# Patient Record
Sex: FEMALE | Race: OTHER | HISPANIC OR LATINO | URBAN - METROPOLITAN AREA
[De-identification: names, ages, dates, MRNs, and addresses within clinical notes are randomized per-mention and may not be internally consistent; named-entity substitution may affect disease eponyms.]

---

## 2021-02-03 ENCOUNTER — EMERGENCY (EMERGENCY)
Facility: HOSPITAL | Age: 31
LOS: 1 days | End: 2021-02-03
Attending: EMERGENCY MEDICINE
Payer: COMMERCIAL

## 2021-02-03 VITALS
HEART RATE: 83 BPM | HEIGHT: 61 IN | DIASTOLIC BLOOD PRESSURE: 84 MMHG | TEMPERATURE: 99 F | SYSTOLIC BLOOD PRESSURE: 137 MMHG | RESPIRATION RATE: 16 BRPM | WEIGHT: 149.91 LBS

## 2021-02-03 DIAGNOSIS — Z98.891 HISTORY OF UTERINE SCAR FROM PREVIOUS SURGERY: Chronic | ICD-10-CM

## 2021-02-03 LAB
ALBUMIN SERPL ELPH-MCNC: 4.2 G/DL — SIGNIFICANT CHANGE UP (ref 3.3–5)
ALP SERPL-CCNC: 137 U/L — HIGH (ref 40–120)
ALT FLD-CCNC: 46 U/L — HIGH (ref 10–45)
ANION GAP SERPL CALC-SCNC: 14 MMOL/L — SIGNIFICANT CHANGE UP (ref 5–17)
AST SERPL-CCNC: 35 U/L — SIGNIFICANT CHANGE UP (ref 10–40)
BASOPHILS # BLD AUTO: 0.03 K/UL — SIGNIFICANT CHANGE UP (ref 0–0.2)
BASOPHILS NFR BLD AUTO: 0.3 % — SIGNIFICANT CHANGE UP (ref 0–2)
BILIRUB SERPL-MCNC: 0.6 MG/DL — SIGNIFICANT CHANGE UP (ref 0.2–1.2)
BUN SERPL-MCNC: 16 MG/DL — SIGNIFICANT CHANGE UP (ref 7–23)
CALCIUM SERPL-MCNC: 9.4 MG/DL — SIGNIFICANT CHANGE UP (ref 8.4–10.5)
CHLORIDE SERPL-SCNC: 105 MMOL/L — SIGNIFICANT CHANGE UP (ref 96–108)
CO2 SERPL-SCNC: 22 MMOL/L — SIGNIFICANT CHANGE UP (ref 22–31)
CREAT SERPL-MCNC: 0.58 MG/DL — SIGNIFICANT CHANGE UP (ref 0.5–1.3)
EOSINOPHIL # BLD AUTO: 0.58 K/UL — HIGH (ref 0–0.5)
EOSINOPHIL NFR BLD AUTO: 5.3 % — SIGNIFICANT CHANGE UP (ref 0–6)
GLUCOSE SERPL-MCNC: 140 MG/DL — HIGH (ref 70–99)
HCG SERPL-ACNC: <2 MIU/ML — SIGNIFICANT CHANGE UP
HCT VFR BLD CALC: 42.3 % — SIGNIFICANT CHANGE UP (ref 34.5–45)
HGB BLD-MCNC: 13.8 G/DL — SIGNIFICANT CHANGE UP (ref 11.5–15.5)
IMM GRANULOCYTES NFR BLD AUTO: 0.3 % — SIGNIFICANT CHANGE UP (ref 0–1.5)
LYMPHOCYTES # BLD AUTO: 19.9 % — SIGNIFICANT CHANGE UP (ref 13–44)
LYMPHOCYTES # BLD AUTO: 2.19 K/UL — SIGNIFICANT CHANGE UP (ref 1–3.3)
MCHC RBC-ENTMCNC: 28.8 PG — SIGNIFICANT CHANGE UP (ref 27–34)
MCHC RBC-ENTMCNC: 32.6 GM/DL — SIGNIFICANT CHANGE UP (ref 32–36)
MCV RBC AUTO: 88.1 FL — SIGNIFICANT CHANGE UP (ref 80–100)
MONOCYTES # BLD AUTO: 0.71 K/UL — SIGNIFICANT CHANGE UP (ref 0–0.9)
MONOCYTES NFR BLD AUTO: 6.5 % — SIGNIFICANT CHANGE UP (ref 2–14)
NEUTROPHILS # BLD AUTO: 7.44 K/UL — HIGH (ref 1.8–7.4)
NEUTROPHILS NFR BLD AUTO: 67.7 % — SIGNIFICANT CHANGE UP (ref 43–77)
NRBC # BLD: 0 /100 WBCS — SIGNIFICANT CHANGE UP (ref 0–0)
PLATELET # BLD AUTO: 219 K/UL — SIGNIFICANT CHANGE UP (ref 150–400)
POTASSIUM SERPL-MCNC: 3.6 MMOL/L — SIGNIFICANT CHANGE UP (ref 3.5–5.3)
POTASSIUM SERPL-SCNC: 3.6 MMOL/L — SIGNIFICANT CHANGE UP (ref 3.5–5.3)
PROT SERPL-MCNC: 8 G/DL — SIGNIFICANT CHANGE UP (ref 6–8.3)
RBC # BLD: 4.8 M/UL — SIGNIFICANT CHANGE UP (ref 3.8–5.2)
RBC # FLD: 13.5 % — SIGNIFICANT CHANGE UP (ref 10.3–14.5)
SODIUM SERPL-SCNC: 141 MMOL/L — SIGNIFICANT CHANGE UP (ref 135–145)
WBC # BLD: 10.98 K/UL — HIGH (ref 3.8–10.5)
WBC # FLD AUTO: 10.98 K/UL — HIGH (ref 3.8–10.5)

## 2021-02-03 PROCEDURE — 72156 MRI NECK SPINE W/O & W/DYE: CPT | Mod: 26,MG

## 2021-02-03 PROCEDURE — G1004: CPT

## 2021-02-03 PROCEDURE — 70545 MR ANGIOGRAPHY HEAD W/DYE: CPT | Mod: 26,59,MG

## 2021-02-03 PROCEDURE — 99282 EMERGENCY DEPT VISIT SF MDM: CPT

## 2021-02-03 PROCEDURE — 99285 EMERGENCY DEPT VISIT HI MDM: CPT

## 2021-02-03 PROCEDURE — 70553 MRI BRAIN STEM W/O & W/DYE: CPT | Mod: 26,ME

## 2021-02-03 NOTE — CONSULT NOTE ADULT - ASSESSMENT
Possible hemiplegic migraine with aura, cannot rule out VST due to multiple risk factors. MRI appears NEGATIVE for small ischemic infarct in R. MCA territory (thalamocapsular region). In the setting of neck pain, cannot rule out migraine 2/2 cervicalgia or migraines w/ superimposed cervical radiculopathy.    Recommendations:  -Follow up MRI brain w/w/o contrast, MR venogram w/ contrast, MRI cervical spine w/w/o contrast  -Symptomatic management of headache with IV Tylenol 1,000mg, Reglan 10mg q8h PRN; Fioricet 1-2 tab q8h PRN   -Serial EKGs and trops for chest pain    Pt to be seen and discussed with Dr. Ramos, neurology attending.    Marti Barclay DO  PGY-2  Neurology Resident   31 y/o RH Monegasque-speaking woman with recent , currently breastfeed, and recent OCP use presents as referral from Dr. Simmons's office for increased frequency of intractable temporal headaches 2-4 times a week, with associated photophobia, nausea/vomiting, blurry vision, vertigo, and left sided face and body paresthesias. NIHSS 2, preMRS 0, LKN 2/3/21. Outside window for tPA. Possible hemiplegic migraine with aura, venous sinus thrombosis and acute ischemic infarct in R. MCA territory ruled out per preliminary MRI read. In the setting of neck pain, migraine could possibly be related to cervicalgia and left hemiparesis with sensory loss may be due to superimposed cervical radiculopathy in the context of imaged C4-C5 disc herniation.    Recommendations:  -Follow up MRI brain w/w/o contrast, MR venogram w/ contrast, MRI cervical spine w/w/o contrast  -Symptomatic management of headache with IV Tylenol 1,000mg, Reglan 10mg q8h PRN; Fioricet 1-2 tab q8h PRN - pain med options limited due to breastfeeding  -Encourage hydration  -Serial EKGs and trops for chest pain    Pt to be seen and discussed with Dr. Ramos, neurology attending.    Marti Barclay DO  PGY-2  Neurology Resident   31 y/o RH Citizen of [place]-speaking woman with recent , currently breastfeed, and recent OCP use (d/c'ed 1 month ago) presents as referral from Dr. Simmons's office for increased frequency of intractable temporal headaches 2-4 times a week, with associated photophobia, nausea/vomiting, blurry vision, vertigo, and left sided face and body paresthesias. NIHSS 2, preMRS 0, LKN 2/3/21. Outside window for tPA. Possible hemiplegic migraine with aura, venous sinus thrombosis and acute ischemic infarct in R. MCA territory ruled out per preliminary MRI read. In the setting of neck pain, migraine could possibly be related to cervicalgia and left hemiparesis with sensory loss may be due to superimposed cervical radiculopathy in the context of imaged C4-C5 disc herniation.    Recommendations:  -Follow up MRI brain w/w/o contrast, MR venogram w/ contrast, MRI cervical spine w/w/o contrast  -Symptomatic management of headache with IV Tylenol 1,000mg, Reglan 10mg q8h PRN; Fioricet 1-2 tab q8h PRN - pain med options limited due to breastfeeding  -Encourage hydration  -Serial EKGs and trops for chest pain    Pt to be seen and discussed with Dr. Ramos, neurology attending.    Marti Barclay DO  PGY-2  Neurology Resident   31 y/o RH Somali-speaking woman with recent , currently breastfeed, and recent OCP use x 1 month (d/c'ed 1 month ago) presents as referral from Dr. Simmons's office for increased frequency of intractable temporal headaches 2-4 times a week, with associated photophobia, nausea/vomiting, blurry vision, vertigo, and left sided face and body paresthesias. NIHSS 2, preMRS 0, LKN /3/21. Outside window for tPA. Possible hemiplegic migraine with aura (mechanism: spreading depolarization), venous sinus thrombosis and acute ischemic infarct in R. MCA territory ruled out per preliminary MRI read. In the setting of neck pain, migraine could possibly be related to cervicalgia and left hemiparesis with sensory loss may be due to superimposed cervical radiculopathy in the context of imaged C4-C5 disc herniation.    Recommendations:  -Follow up MRI brain w/w/o contrast, MR venogram w/ contrast, MRI cervical spine w/w/o contrast  -Symptomatic management of headache with IV Tylenol 1,000mg, Reglan 10mg q8h PRN; Fioricet 1-2 tab q8h PRN - pain med options limited due to breastfeeding  -Encourage hydration  -Serial EKGs and trops for chest pain    Pt to be seen and discussed with Dr. Ramos, neurology attending.    Marti Barclay DO  PGY-2  Neurology Resident   31 y/o RH Egyptian-speaking woman with recent , currently breastfeed, and recent OCP use x 1 month (d/c'ed 1 month ago) presents as referral from Dr. Simmons's office for increased frequency of intractable temporal headaches 2-4 times a week, with associated photophobia, nausea/vomiting, blurry vision, vertigo, and left sided face and body paresthesias. NIHSS 2, preMRS 0, LKN /3/21. Outside window for tPA. Possible hemiplegic migraine with aura (mechanism: spreading depolarization), venous sinus thrombosis and acute ischemic infarct in R. MCA territory ruled out per preliminary MRI read. In the setting of neck pain, migraine could possibly be related to cervicalgia and left hemiparesis with sensory loss may be due to superimposed cervical radiculopathy in the context of imaged C4-C5 disc herniation.    Recommendations:  -Follow up MRI brain w/w/o contrast, MR venogram w/ contrast, MRI cervical spine w/w/o contrast  -Symptomatic management of headache with IV Tylenol 1,000mg, Reglan 10mg q8h PRN; Fioricet 1-2 tab q8h PRN - pain med options limited due to breastfeeding  -Encourage hydration  -Serial EKGs and trops for chest pain  -q4h neurochecks  -STAT CT head if acute change in exam or mental status    Pt to be seen and discussed with Dr. Ramos, neurology attending.    Marti Barclay,   PGY-2  Neurology Resident   31 y/o RH Haitian-speaking woman with recent , currently breastfeeding, and recent OCP use x 1 month (d/c'ed 1 month ago) presents as referral from Dr. Simmons's office for increased frequency of intractable temporal headaches 2-4 times a week, with associated photophobia, nausea/vomiting, blurry vision, vertigo, and left sided face and body paresthesias. NIHSS 2, preMRS 0, LKN 2/3/. Outside window for tPA. Possible hemiplegic migraine with aura (mechanism: spreading depolarization), venous sinus thrombosis and acute ischemic infarct in R. MCA territory ruled out per preliminary MRI read. In the setting of neck pain, migraine could possibly be related to cervicalgia and left hemiparesis with sensory loss may be due to superimposed cervical radiculopathy in the context of imaged C4-C5 disc herniation.    Recommendations:  -Follow up MRI brain w/w/o contrast, MR venogram w/ contrast, MRI cervical spine w/w/o contrast  -Defer to ED regarding appropriate f/u for C4-C5 disc herniation on imaging (ortho vs neurosurgery)  -Symptomatic management of headache with IV Tylenol 1,000mg, Reglan 10mg q8h PRN; Fioricet 1-2 tab q8h PRN - pain med options limited due to breastfeeding  -Encourage hydration  -Serial EKGs and trops for chest pain  -q4h neurochecks  -STAT CT head if acute change in exam or mental status    Pt to be seen and discussed with Dr. Ramos, neurology attending.    Marti Barclay DO  PGY-2  Neurology Resident

## 2021-02-03 NOTE — ED PROVIDER NOTE - PHYSICAL EXAMINATION
GENERAL: NAD, lying in bed comfortably  HEAD:  Atraumatic, Normocephalic  EYES: EOMI, PERRLA, conjunctiva and sclera clear  ENT: Moist mucous membranes  NECK: Supple, No JVD  CHEST/LUNG: Clear to auscultation bilaterally; No rales, rhonchi, wheezing, or rubs. Unlabored respirations. TTP L. chest.  HEART: Regular rate and rhythm; No murmurs, rubs, or gallops  ABDOMEN: Bowel sounds present; Soft, Nontender, Nondistended.   EXTREMITIES:  2+ Peripheral Pulses, brisk capillary refill. No clubbing, cyanosis, or edema  NERVOUS SYSTEM:  Alert & Oriented X3, speech clear. CN1-12 intact except sensation at left V2 distribution is more painful to light touch.   MSK: L shoulder ROM and strength limited by pain; ttp.   SKIN: No rashes or lesions

## 2021-02-03 NOTE — CONSULT NOTE ADULT - SUBJECTIVE AND OBJECTIVE BOX
HPI:  31 y/o woman with recent  and OCP use presents as referral from Dr. Simmons's office for increased frequency of intractable temporal headaches 2-4 times a week, with associated photophobia, nausea, vomiting, blurry vision, and vertigo. Per outpatient notes, pt was noted to have slightly diminished sensation to light touch for left face, arm, and leg, and very mild left hemiparesis. Due to concern for venous sinus thrombosis, pt was sent into [facility] for emergent MRI/MRV imaging.    PAST MEDICAL & SURGICAL HISTORY:    FAMILY HISTORY:    Allergies    penicillin (Urticaria)    Intolerances        SHx - No smoking, No ETOH, No drug abuse      Review of Systems:  CONSTITUTIONAL:   HEENT:  No visual loss, blurred vision, double vision.  No hearing loss, sneezing, congestion, runny nose or sore throat.  SKIN:  No rash or itching.  CARDIOVASCULAR:  No chest pain, chest pressure or chest discomfort. No palpitations or edema.  RESPIRATORY:  No shortness of breath, cough or sputum.  GASTROINTESTINAL:  No anorexia, nausea, vomiting or diarrhea. No abdominal pain.  GENITOURINARY:  NO dysuria. No increased frequency. No retention. No incontinence.  NEUROLOGICAL:  See HPI  MUSCULOSKELETAL:  No muscle, back pain, joint pain or stiffness.  HEMATOLOGIC:  No anemia, bleeding or bruising.  PSYCHIATRIC:    ENDOCRINOLOGIC:  No reports of sweating, cold or heat intolerance. No polyuria or polydipsia.        Vital Signs Last 24 Hrs  T(C): 37.3 (2021 18:27), Max: 37.3 (2021 18:27)  T(F): 99.1 (2021 18:27), Max: 99.1 (2021 18:27)  HR: 83 (2021 18:27) (83 - 83)  BP: 137/84 (2021 18:27) (137/84 - 137/84)  BP(mean): --  RR: 16 (2021 18:27) (16 - 16)  SpO2: --    PHYSICAL EXAM:  GENERAL: NAD  HEENT: Normocephalic;  conjunctivae and sclerae clear; moist mucous membranes;   NECK : supple  CHEST/LUNG: Clear to auscultation bilaterally with good air entry   HEART: S1 S2  regular; no murmurs, gallops or rubs  ABDOMEN: Soft, Nontender, Nondistended; Bowel sounds present  EXTREMITIES: no cyanosis; no edema; no calf tenderness  SKIN: warm and dry; no rash             Neurological Exam:  - Mental Status:  AAOx3; speech is fluent with intact naming, repetition, and comprehension  - Cranial Nerves II-XII:    II:  PERRLA; visual fields are full to confrontation  III, IV, VI:  EOMI, no nystagmus  V:  facial sensation is intact in the V1-V3 distribution bilaterally.  VII:  face is symmetric with normal eye closure and smile  VIII:  hearing is intact to finger rub  IX, X:  uvula is midline and soft palate rises symmetrically  XI:  head turning and shoulder shrug are intact bilaterally  XII:  tongue protrudes in the midline  - Motor:  strength is 5/5 throughout; normal muscle bulk and tone throughout; no pronator drift  - Reflexes:  2+ and symmetric at the biceps, triceps, brachioradialis, knees, and ankles;  plantar reflexes downgoing bilaterally  - Sensory:  intact to light touch, pin prick, vibration, and joint-position sense throughout  - Coordination:  finger-nose-finger and heel-knee-shin intact without dysmetria; rapid alternating hand movements intact  - Gait:  normal steps, base, arm swing, and turning; heel and toe walking are normal; tandem gait is normal; Romberg testing is negative    Other:        141  |  105  |  16  ----------------------------<  140<H>  3.6   |  22  |  0.58    Ca    9.4      2021 19:02    TPro  8.0  /  Alb  4.2  /  TBili  0.6  /  DBili  x   /  AST  35  /  ALT  46<H>  /  AlkPhos  137<H>                              13.8   10.98 )-----------( 219      ( 2021 19:02 )             42.3       Radiology    CT:   MRI  EKG:  tele:  TTE:  EEG:              HPI:  29 y/o woman with recent  and OCP use presents as referral from Dr. Simmons's office for increased frequency of intractable temporal headaches 2-4 times a week, with associated photophobia, nausea, vomiting, blurry vision, and vertigo. She has bilateral temporal 8/10 headaches almost everyday since her pregnancy. Pt states she first starts to have flashing lights of her left eye, a few hours later she has a throbbing headache located at her left and right temples with no radiation. Today the headache was associated with left-sided neck pain, paresthesias of LUE, LLE, and left cheek. Prior to pregnancy, pt reports headaches twice a month that were not as severe and tension-type. Of note, she has also been sleep deprived recently due to  of 4 months and headaches also keep her from sleeping well. She does not take any OTC medications. Pt also complains of chest pressure that radiates to her left upper back. She denies change in headache quality associated with position or coughing. Her mother told her the corner of her mouth on the left looked like it was drooping today and she went to Dr. Simmons's (neurologist) office. Per outpatient notes, pt was noted to have slightly diminished sensation to light touch for left face, arm, and leg, and very mild left hemiparesis. Due to concern for venous sinus thrombosis, pt was sent to [facility] for emergent MRI/MRV imaging.    NIHSS ***, preMRS 0, LKN unknown 2/3/21    Allergies  penicillin (Urticaria)    SHx - No smoking, No ETOH, No drug abuse    Review of Systems:  CONSTITUTIONAL: No fevers, chills, night sweats  HEENT:  No visual loss, blurred vision, double vision. No hearing loss, sneezing, congestion, runny nose or sore throat.  SKIN:  No rash or itching.  CARDIOVASCULAR: +Chest discomfort w/ some radiation to L posterior back. No palpitations or edema.  RESPIRATORY: No shortness of breath, cough or sputum.  GASTROINTESTINAL:  No anorexia, nausea, vomiting or diarrhea. No abdominal pain.  GENITOURINARY:  No dysuria. No increased frequency. No retention. No incontinence.  NEUROLOGICAL:  See HPI  MUSCULOSKELETAL: +neck pain; no back pain, joint pain or stiffness.  HEMATOLOGIC:  No anemia, bleeding or bruising.    Vital Signs Last 24 Hrs  T(C): 37.3 (2021 18:27), Max: 37.3 (2021 18:27)  T(F): 99.1 (2021 18:27), Max: 99.1 (2021 18:27)  HR: 83 (2021 18:27) (83 - 83)  BP: 137/84 (2021 18:27) (137/84 - 137/84)  BP(mean): --  RR: 16 (2021 18:27) (16 - 16)  SpO2: --    PHYSICAL EXAM:  GENERAL: Obese female in NAD   HEENT: Normocephalic; conjunctivae and sclerae clear; moist mucous membranes  NECK: Supple  EXTREMITIES: No cyanosis; no edema; no calf tenderness  SKIN: Warm and dry; no rash             Neurological Exam:  - Mental Status: Alert and oriented to person, place, time, situation; no dysarthria, speech is fluent with intact naming, repetition, and comprehension; follows crossed commands  - Cranial Nerves II-XII:    II:  PERRL 4mm b/l; visual fields are full to confrontation  Visual acuity 20/20 bilaterally, no red color desaturation on my exam  III, IV, VI:  EOMI, no nystagmus  V: diminished to light touch in the left V1, V2 distribution  VII: mild L. flattening of nasolabial fold with normal eye closure  VIII:  hearing is intact to finger rub  IX, X:  uvula is midline and soft palate rises symmetrically  XI:  head turning and shoulder shrug are intact bilaterally  XII:  tongue protrudes in the midline  - Motor: 4+/5 LUE and LLE, otherwise 5/5 throughout; normal muscle bulk and tone throughout   - Reflexes: 2+ and symmetric at the biceps, triceps, brachioradialis, knees, and ankles; plantar reflexes downgoing bilaterally  - Sensory: diminished to light touch for LUE and LLE compared to right side  - Coordination: finger-nose-finger and heel-knee-shin intact without dysmetria; rapid alternating hand movements intact  - Gait: deferred    Other:    -    141  |  105  |  16  ----------------------------<  140<H>  3.6   |  22  |  0.58    Ca    9.4      2021 19:02    TPro  8.0  /  Alb  4.2  /  TBili  0.6  /  DBili  x   /  AST  35  /  ALT  46<H>  /  AlkPhos  137<H>  0203                            13.8   10.98 )-----------( 219      ( 2021 19:02 )             42.3       Radiology                 HPI:  31 y/o RH Pashto-speaking woman with recent , currently breastfeed, and recent OCP use presents as referral from Dr. Simmons's office for increased frequency of intractable temporal headaches 2-4 times a week, with associated photophobia, nausea/vomiting, blurry vision, vertigo, and left sided face and body paresthesias. She has bilateral temporal 8/10 headaches almost everyday since her pregnancy. Pt states she first starts to have flashing lights of her left eye, a few hours later she has a throbbing headache located at her left and right temples with no radiation. Today the headache was associated with left-sided neck pain, paresthesias of LUE, LLE, and left cheek. Prior to pregnancy, pt reports headaches twice a month that were not as severe and tension-type. Of note, she has also been sleep deprived recently due to  of 4 months and headaches also keep her from sleeping well. She does not take any OTC medications. Pt also complains of chest pressure that radiates to her left upper back. She denies change in headache quality associated with position or coughing. Her mother told her the corner of her mouth on the left looked like it was drooping today and she went to Dr. Simmons's (neurologist) office. Per outpatient notes, pt was noted to have slightly diminished sensation to light touch for left face, arm, and leg, and very mild left hemiparesis. Due to concern for venous sinus thrombosis, pt was sent to [facility] for emergent MRI/MRV imaging.    NIHSS 2, preMRS 0, LKN unknown 2/3/21    Allergies  penicillin (Urticaria)    SHx - No smoking, No ETOH, No drug abuse    Review of Systems:  CONSTITUTIONAL: No fevers, chills, night sweats  HEENT:  No visual loss, blurred vision, double vision. No hearing loss, sneezing, congestion, runny nose or sore throat.  SKIN:  No rash or itching.  CARDIOVASCULAR: +Chest discomfort w/ some radiation to L posterior back. No palpitations or edema.  RESPIRATORY: No shortness of breath, cough or sputum.  GASTROINTESTINAL:  No anorexia, nausea, vomiting or diarrhea. No abdominal pain.  GENITOURINARY:  No dysuria. No increased frequency. No retention. No incontinence.  NEUROLOGICAL:  See HPI  MUSCULOSKELETAL: +neck pain; no back pain, joint pain or stiffness.  HEMATOLOGIC:  No anemia, bleeding or bruising.    Vital Signs Last 24 Hrs  T(C): 37.3 (2021 18:27), Max: 37.3 (2021 18:27)  T(F): 99.1 (2021 18:27), Max: 99.1 (2021 18:27)  HR: 83 (2021 18:27) (83 - 83)  BP: 137/84 (2021 18:27) (137/84 - 137/84)  BP(mean): --  RR: 16 (2021 18:27) (16 - 16)  SpO2: --    PHYSICAL EXAM:  GENERAL: Obese female in NAD   HEENT: Normocephalic; conjunctivae and sclerae clear; moist mucous membranes  NECK: Supple  EXTREMITIES: No cyanosis; no edema; no calf tenderness  SKIN: Warm and dry; no rash             Neurological Exam:  - Mental Status: Alert and oriented to person, place, time, situation; no dysarthria, speech is fluent with intact naming, repetition, and comprehension; follows crossed commands  - Cranial Nerves II-XII:    II:  PERRL 4mm b/l; visual fields are full to confrontation  Visual acuity 20/20 bilaterally, no red color desaturation on my exam  III, IV, VI:  EOMI w/ slight discomfort with upgaze, no nystagmus  V: diminished to light touch in the left V1, V2 distribution  VII: mild L. flattening of nasolabial fold with normal eye closure  VIII:  hearing is intact to finger rub  IX, X:  uvula is midline and soft palate rises symmetrically  XI:  head turning and shoulder shrug are intact bilaterally  XII:  tongue protrudes in the midline  - Motor: 4+/5 LUE and LLE, no drift, otherwise 5/5 throughout; normal muscle bulk and tone throughout   - Reflexes: 2+ and symmetric at the biceps, triceps, brachioradialis, knees, and ankles; plantar reflexes downgoing bilaterally  - Sensory: diminished to light touch for LUE and LLE compared to right side  - Coordination: finger-nose-finger and heel-knee-shin intact without dysmetria; rapid alternating hand movements intact  - Gait: deferred    Other:        141  |  105  |  16  ----------------------------<  140<H>  3.6   |  22  |  0.58    Ca    9.4      2021 19:02    TPro  8.0  /  Alb  4.2  /  TBili  0.6  /  DBili  x   /  AST  35  /  ALT  46<H>  /  AlkPhos  137<H>                              13.8   10.98 )-----------( 219      ( 2021 19:02 )             42.3       Radiology  MR Venogram Head w/ IV Cont (21 @ 23:08)   ******PRELIMINARY REPORT******        INTERPRETATION:  No dural venous sinus thrombosis.  Dominant left transvere/sigmoid sinus  High riding left jugular bulb  f/u official report              HPI:  31 y/o RH Mohawk-speaking woman with recent , currently breastfeed, and recent OCP use (d/c'ed 1 month ago) presents as referral from Dr. Simmons's office for increased frequency of intractable temporal headaches 2-4 times a week, with associated photophobia, nausea/vomiting, blurry vision, vertigo, and left sided face and body paresthesias. She has bilateral (L>R) temporal 8/10 headaches almost everyday since her pregnancy. Pt states she first starts to have flashing lights of her left eye, a few hours later she has a throbbing headache located at her left and right temples with no radiation. Today the headache was associated with left-sided neck pain, paresthesias of LUE, LLE, and left cheek. Prior to pregnancy, pt reports headaches twice a month that were not as severe and tension-type. Of note, she has also been sleep deprived recently due to  of 4 months and headaches also keep her from sleeping well. She does not take any OTC medications. Pt also complains of chest pressure that radiates to her left upper back. She denies change in headache quality associated with position or coughing. Her mother told her the corner of her mouth on the left looked like it was drooping today and she went to Dr. Simmons's (neurologist) office. Per outpatient notes, pt was noted to have slightly diminished sensation to light touch for left face, arm, and leg, and very mild left hemiparesis. Due to concern for venous sinus thrombosis, pt was sent to [facility] for emergent MRI/MRV imaging.    NIHSS 2, preMRS 0, LKN unknown 2/3/21    Allergies  penicillin (Urticaria)    SHx - No smoking, No ETOH, No drug abuse    Review of Systems:  CONSTITUTIONAL: No fevers, chills, night sweats  HEENT:  No visual loss, blurred vision, double vision. No hearing loss, sneezing, congestion, runny nose or sore throat.  SKIN:  No rash or itching.  CARDIOVASCULAR: +Chest discomfort w/ some radiation to L posterior back. No palpitations or edema.  RESPIRATORY: No shortness of breath, cough or sputum.  GASTROINTESTINAL:  No anorexia, nausea, vomiting or diarrhea. No abdominal pain.  GENITOURINARY:  No dysuria. No increased frequency. No retention. No incontinence.  NEUROLOGICAL:  See HPI  MUSCULOSKELETAL: +neck pain; no back pain, joint pain or stiffness.  HEMATOLOGIC:  No anemia, bleeding or bruising.    Vital Signs Last 24 Hrs  T(C): 37.3 (2021 18:27), Max: 37.3 (2021 18:27)  T(F): 99.1 (2021 18:27), Max: 99.1 (2021 18:27)  HR: 83 (2021 18:27) (83 - 83)  BP: 137/84 (2021 18:27) (137/84 - 137/84)  BP(mean): --  RR: 16 (2021 18:27) (16 - 16)  SpO2: --    PHYSICAL EXAM:  GENERAL: Obese female in NAD   HEENT: Normocephalic; conjunctivae and sclerae clear; moist mucous membranes  NECK: Supple  EXTREMITIES: No cyanosis; no edema; no calf tenderness  SKIN: Warm and dry; no rash             Neurological Exam:  - Mental Status: Alert and oriented to person, place, time, situation; no dysarthria, speech is fluent with intact naming, repetition, and comprehension; follows crossed commands  - Cranial Nerves II-XII:    II:  PERRL 4mm b/l; visual fields are full to confrontation  Visual acuity 20/20 bilaterally, no red color desaturation on my exam  III, IV, VI:  EOMI w/ slight discomfort with upgaze, no nystagmus  V: diminished to light touch in the left V1, V2 distribution  VII: mild L. flattening of nasolabial fold with normal eye closure  VIII:  hearing is intact to finger rub  IX, X:  uvula is midline and soft palate rises symmetrically  XI:  head turning and shoulder shrug are intact bilaterally  XII:  tongue protrudes in the midline  - Motor: 4+/5 LUE and LLE, no drift, otherwise 5/5 throughout; normal muscle bulk and tone throughout   - Reflexes: 2+ and symmetric at the biceps, triceps, brachioradialis, knees, and ankles; plantar reflexes downgoing bilaterally  - Sensory: diminished to light touch for LUE and LLE compared to right side  - Coordination: finger-nose-finger and heel-knee-shin intact without dysmetria; rapid alternating hand movements intact  - Gait: deferred    Other:    02-03    141  |  105  |  16  ----------------------------<  140<H>  3.6   |  22  |  0.58    Ca    9.4      2021 19:02    TPro  8.0  /  Alb  4.2  /  TBili  0.6  /  DBili  x   /  AST  35  /  ALT  46<H>  /  AlkPhos  137<H>                              13.8   10.98 )-----------( 219      ( 2021 19:02 )             42.3       Radiology  MR Venogram Head w/ IV Cont (21 @ 23:08)   ******PRELIMINARY REPORT******        INTERPRETATION:  No dural venous sinus thrombosis.  Dominant left transvere/sigmoid sinus  High riding left jugular bulb  f/u official report              HPI:  29 y/o RH Georgian-speaking woman with recent , currently breastfeed, and recent OCP use x 1 month (d/c'ed 1 month ago) presents as referral from Dr. Simmons's office for increased frequency of intractable temporal headaches 2-4 times a week, with associated photophobia, nausea/vomiting, blurry vision, vertigo, and left sided face and body paresthesias. She has bilateral (L>R) temporal 8/10 headaches almost everyday since her pregnancy. Pt states she first starts to have flashing lights of her left eye, a few hours later she has a throbbing headache located at her left and right temples with no radiation. Today the headache was associated with left-sided neck pain, paresthesias of LUE, LLE, and left cheek. Prior to pregnancy, pt reports headaches twice a month that were not as severe and tension-type. Of note, she has also been sleep deprived recently due to  of 4 months and headaches also keep her from sleeping well. She does not take any OTC medications. Pt also complains of chest pressure that radiates to her left upper back. She denies change in headache quality associated with position or coughing. Her mother told her the corner of her mouth on the left looked like it was drooping today and she went to Dr. Simmons's (neurologist) office. Per outpatient notes, pt was noted to have slightly diminished sensation to light touch for left face, arm, and leg, and very mild left hemiparesis. Due to concern for venous sinus thrombosis, pt was sent to [facility] for emergent MRI/MRV imaging.    NIHSS 2, preMRS 0, LKN unknown 2/3/21    Allergies  penicillin (Urticaria)    SHx - No smoking, No ETOH, No drug abuse    Review of Systems:  CONSTITUTIONAL: No fevers, chills, night sweats  HEENT:  No visual loss, blurred vision, double vision. No hearing loss, sneezing, congestion, runny nose or sore throat.  SKIN:  No rash or itching.  CARDIOVASCULAR: +Chest discomfort w/ some radiation to L posterior back. No palpitations or edema.  RESPIRATORY: No shortness of breath, cough or sputum.  GASTROINTESTINAL:  No anorexia, nausea, vomiting or diarrhea. No abdominal pain.  GENITOURINARY:  No dysuria. No increased frequency. No retention. No incontinence.  NEUROLOGICAL:  See HPI  MUSCULOSKELETAL: +neck pain; no back pain, joint pain or stiffness.  HEMATOLOGIC:  No anemia, bleeding or bruising.    Vital Signs Last 24 Hrs  T(C): 37.3 (2021 18:27), Max: 37.3 (2021 18:27)  T(F): 99.1 (2021 18:27), Max: 99.1 (2021 18:27)  HR: 83 (2021 18:27) (83 - 83)  BP: 137/84 (2021 18:27) (137/84 - 137/84)  BP(mean): --  RR: 16 (2021 18:27) (16 - 16)  SpO2: --    PHYSICAL EXAM:  GENERAL: Obese female in NAD   HEENT: Normocephalic; conjunctivae and sclerae clear; moist mucous membranes  NECK: Supple  EXTREMITIES: No cyanosis; no edema; no calf tenderness  SKIN: Warm and dry; no rash             Neurological Exam:  - Mental Status: Alert and oriented to person, place, time, situation; no dysarthria, speech is fluent with intact naming, repetition, and comprehension; follows crossed commands  - Cranial Nerves II-XII:    II:  PERRL 4mm b/l; visual fields are full to confrontation  Visual acuity 20/20 bilaterally, no red color desaturation on my exam  III, IV, VI:  EOMI w/ slight discomfort with upgaze, no nystagmus  V: diminished to light touch in the left V1, V2 distribution  VII: mild L. flattening of nasolabial fold with normal eye closure  VIII:  hearing is intact to finger rub  IX, X:  uvula is midline and soft palate rises symmetrically  XI:  head turning and shoulder shrug are intact bilaterally  XII:  tongue protrudes in the midline  - Motor: 4+/5 LUE and LLE, no drift, otherwise 5/5 throughout; normal muscle bulk and tone throughout   - Reflexes: 2+ and symmetric at the biceps, triceps, brachioradialis, knees, and ankles; plantar reflexes downgoing bilaterally  - Sensory: diminished to light touch for LUE and LLE compared to right side  - Coordination: finger-nose-finger and heel-knee-shin intact without dysmetria; rapid alternating hand movements intact  - Gait: deferred    Other:        141  |  105  |  16  ----------------------------<  140<H>  3.6   |  22  |  0.58    Ca    9.4      2021 19:02    TPro  8.0  /  Alb  4.2  /  TBili  0.6  /  DBili  x   /  AST  35  /  ALT  46<H>  /  AlkPhos  137<H>                              13.8   10.98 )-----------( 219      ( 2021 19:02 )             42.3       Radiology  MR Venogram Head w/ IV Cont (21 @ 23:08)   ******PRELIMINARY REPORT******        INTERPRETATION:  No dural venous sinus thrombosis.  Dominant left transvere/sigmoid sinus  High riding left jugular bulb  f/u official report              HPI:  31 y/o RH Yoruba-speaking woman with recent , currently breastfeed, and recent OCP use x 1 month (d/c'ed 1 month ago) presents as referral from Dr. Simmons's office for increased frequency of intractable temporal headaches 2-4 times a week, with associated photophobia, nausea/vomiting, blurry vision, vertigo, and left sided face and body paresthesias. She has bilateral (L>R) temporal 8/10 headaches almost everyday since her pregnancy. Pt states she first starts to have flashing lights of her left eye, a few hours later she has a throbbing headache located at her left and right temples with no radiation. Today the headache was associated with left-sided neck pain, paresthesias of LUE, LLE, and left cheek. Prior to pregnancy, pt reports headaches twice a month that were not as severe and tension-type. Of note, she has also been sleep deprived recently due to  of 4 months and headaches also keep her from sleeping well. She does not take any OTC medications. Pt also complains of chest pressure that radiates to her left upper shoulder/scapula. She denies change in headache quality associated with position or coughing. Her mother told her the corner of her mouth on the left looked like it was drooping today and she went to Dr. Simmons's (neurologist) office. Per outpatient notes, pt was noted to have slightly diminished sensation to light touch for left face, arm, and leg, and very mild left hemiparesis. Due to concern for venous sinus thrombosis, pt was sent to [facility] for emergent MRI/MRV imaging.    NIHSS 2, preMRS 0, LKN unknown 2/3/21    Allergies  penicillin (Urticaria)    SHx - No smoking, No ETOH, No drug abuse    Review of Systems:  CONSTITUTIONAL: No fevers, chills, night sweats  HEENT:  No visual loss, blurred vision, double vision. No hearing loss, sneezing, congestion, runny nose or sore throat.  SKIN:  No rash or itching.  CARDIOVASCULAR: +Chest discomfort w/ some radiation to L posterior back. No palpitations or edema.  RESPIRATORY: No shortness of breath, cough or sputum.  GASTROINTESTINAL:  No anorexia, nausea, vomiting or diarrhea. No abdominal pain.  GENITOURINARY:  No dysuria. No increased frequency. No retention. No incontinence.  NEUROLOGICAL:  See HPI  MUSCULOSKELETAL: +neck pain; no back pain, joint pain or stiffness.  HEMATOLOGIC:  No anemia, bleeding or bruising.    Vital Signs Last 24 Hrs  T(C): 37.3 (2021 18:27), Max: 37.3 (2021 18:27)  T(F): 99.1 (2021 18:27), Max: 99.1 (2021 18:27)  HR: 83 (2021 18:27) (83 - 83)  BP: 137/84 (2021 18:27) (137/84 - 137/84)  BP(mean): --  RR: 16 (2021 18:27) (16 - 16)  SpO2: --    PHYSICAL EXAM:  GENERAL: Obese female in NAD   HEENT: Normocephalic; conjunctivae and sclerae clear; moist mucous membranes  NECK: Supple  EXTREMITIES: No cyanosis; no edema; no calf tenderness  SKIN: Warm and dry; no rash             Neurological Exam:  - Mental Status: Alert and oriented to person, place, time, situation; no dysarthria, speech is fluent with intact naming, repetition, and comprehension; follows crossed commands  - Cranial Nerves II-XII:    II:  PERRL 4mm b/l; visual fields are full to confrontation  Visual acuity 20/20 bilaterally, no red color desaturation on my exam  III, IV, VI:  EOMI w/ slight discomfort with upgaze, no nystagmus  V: diminished to light touch in the left V1, V2 distribution  VII: mild L. flattening of nasolabial fold with normal eye closure  VIII:  hearing is intact to finger rub  IX, X:  uvula is midline and soft palate rises symmetrically  XI:  head turning and shoulder shrug are intact bilaterally  XII:  tongue protrudes in the midline  - Motor: 4+/5 LUE and LLE, no drift, otherwise 5/5 throughout; normal muscle bulk and tone throughout   - Reflexes: 2+ and symmetric at the biceps, triceps, brachioradialis, knees, and ankles; plantar reflexes downgoing bilaterally  - Sensory: diminished to light touch for LUE and LLE compared to right side  - Coordination: finger-nose-finger and heel-knee-shin intact without dysmetria; rapid alternating hand movements intact  - Gait: deferred    Other:        141  |  105  |  16  ----------------------------<  140<H>  3.6   |  22  |  0.58    Ca    9.4      2021 19:02    TPro  8.0  /  Alb  4.2  /  TBili  0.6  /  DBili  x   /  AST  35  /  ALT  46<H>  /  AlkPhos  137<H>                              13.8   10.98 )-----------( 219      ( 2021 19:02 )             42.3       Radiology  MR Venogram Head w/ IV Cont (21 @ 23:08)   ******PRELIMINARY REPORT******        INTERPRETATION:  No dural venous sinus thrombosis.  Dominant left transvere/sigmoid sinus  High riding left jugular bulb  f/u official report              HPI:  31 y/o RH Bulgarian-speaking woman with recent , currently breastfeeding, and recent OCP use x 1 month (d/c'ed 1 month ago) presents as referral from Dr. Simmons's office for increased frequency of intractable temporal headaches 2-4 times a week, with associated photophobia, nausea/vomiting, blurry vision, vertigo, and left sided face and body paresthesias. She has bilateral (L>R) temporal 8/10 headaches almost everyday since her pregnancy. Pt states she first starts to have flashing lights of her left eye, a few hours later she has a throbbing headache located at her left and right temples with no radiation. Today the headache was associated with left-sided neck pain, paresthesias of LUE, LLE, and left cheek. Prior to pregnancy, pt reports headaches twice a month that were not as severe and tension-type. Of note, she has also been sleep deprived recently due to  of 4 months and headaches also keep her from sleeping well. She does not take any OTC medications. Pt also complains of chest pressure that radiates to her left upper shoulder/scapula. She denies change in headache quality associated with position or coughing. Her mother told her the corner of her mouth on the left looked like it was drooping today and she went to Dr. Simmons's (neurologist) office. Per outpatient notes, pt was noted to have slightly diminished sensation to light touch for left face, arm, and leg, and very mild left hemiparesis. Due to concern for venous sinus thrombosis, pt was sent to [facility] for emergent MRI/MRV imaging.    NIHSS 2, preMRS 0, LKN unknown 2/3/21    Allergies  penicillin (Urticaria)    SHx - No smoking, No ETOH, No drug abuse    Review of Systems:  CONSTITUTIONAL: No fevers, chills, night sweats  HEENT:  No visual loss, blurred vision, double vision. No hearing loss, sneezing, congestion, runny nose or sore throat.  SKIN:  No rash or itching.  CARDIOVASCULAR: +Chest discomfort w/ some radiation to L posterior back. No palpitations or edema.  RESPIRATORY: No shortness of breath, cough or sputum.  GASTROINTESTINAL:  No anorexia, nausea, vomiting or diarrhea. No abdominal pain.  GENITOURINARY:  No dysuria. No increased frequency. No retention. No incontinence.  NEUROLOGICAL:  See HPI  MUSCULOSKELETAL: +neck pain; no back pain, joint pain or stiffness.  HEMATOLOGIC:  No anemia, bleeding or bruising.    Vital Signs Last 24 Hrs  T(C): 37.3 (2021 18:27), Max: 37.3 (2021 18:27)  T(F): 99.1 (2021 18:27), Max: 99.1 (2021 18:27)  HR: 83 (2021 18:27) (83 - 83)  BP: 137/84 (2021 18:27) (137/84 - 137/84)  BP(mean): --  RR: 16 (2021 18:27) (16 - 16)  SpO2: --    PHYSICAL EXAM:  GENERAL: Obese female in NAD   HEENT: Normocephalic; conjunctivae and sclerae clear; moist mucous membranes  NECK: Supple  EXTREMITIES: No cyanosis; no edema; no calf tenderness  SKIN: Warm and dry; no rash             Neurological Exam:  - Mental Status: Alert and oriented to person, place, time, situation; no dysarthria, speech is fluent with intact naming, repetition, and comprehension; follows crossed commands  - Cranial Nerves II-XII:    II:  PERRL 4mm b/l; visual fields are full to confrontation  Visual acuity 20/20 bilaterally, no red color desaturation on my exam  III, IV, VI:  EOMI w/ slight discomfort with upgaze, no nystagmus  V: diminished to light touch in the left V1, V2 distribution  VII: mild L. flattening of nasolabial fold with normal eye closure  VIII:  hearing is intact to finger rub  IX, X:  uvula is midline and soft palate rises symmetrically  XI:  head turning and shoulder shrug are intact bilaterally  XII:  tongue protrudes in the midline  - Motor: 4+/5 LUE and LLE, no drift, otherwise 5/5 throughout; normal muscle bulk and tone throughout   - Reflexes: 2+ and symmetric at the biceps, triceps, brachioradialis, knees, and ankles; plantar reflexes downgoing bilaterally  - Sensory: diminished to light touch for LUE and LLE compared to right side  - Coordination: finger-nose-finger and heel-knee-shin intact without dysmetria; rapid alternating hand movements intact  - Gait: deferred    Other:        141  |  105  |  16  ----------------------------<  140<H>  3.6   |  22  |  0.58    Ca    9.4      2021 19:02    TPro  8.0  /  Alb  4.2  /  TBili  0.6  /  DBili  x   /  AST  35  /  ALT  46<H>  /  AlkPhos  137<H>                              13.8   10.98 )-----------( 219      ( 2021 19:02 )             42.3       Radiology  MR Venogram Head w/ IV Cont (21 @ 23:08)   ******PRELIMINARY REPORT******        INTERPRETATION:  No dural venous sinus thrombosis.  Dominant left transvere/sigmoid sinus  High riding left jugular bulb  f/u official report

## 2021-02-03 NOTE — ED PROVIDER NOTE - PROGRESS NOTE DETAILS
Vivi Fuller MD - Attending Physician: Spoke with Neuro. Pt was sent in from Dr Simmons's office for urgent MRI/MRV given postpartum, recent OCPs and neuro complaints. MRI waiting for patient, Dr Weldon aware to read. Pt still in waiting area. Labs/IV already obtained. Triage RN updated to ensure patient gets to MRI asap

## 2021-02-03 NOTE — ED PROVIDER NOTE - ATTENDING CONTRIBUTION TO CARE
MD Johns:  patient seen and evaluated personally.   I agree with the History & Physical,  Impression & Plan other than what was detailed in my note.  MD Johns  31 y/o f presenting w/ ha x 3-4 weeks. Pt states mainly on left side but can also be on r side, intermittently has lights flashing when she wakes up, ha 6/10 not better or worse w/ anything in particular. At times takes apap for pain. Denies n/v, f/c, rash, pos neck pain going down left arm, no unilateral weakness, afebrile vitals stable, non toxic, neuro exam benign perrl, no steamy cornea, no ttp over temp area, no meningeal signs. pt qdoc'd, mri ordered to r/o vst given recent pregnancy, no s/s concerning for pre eclampsia at this time. offered pain meds pt does not want. awaiting mri read and neuro recs MD Johns:  patient seen and evaluated personally.   I agree with the History & Physical,  Impression & Plan other than what was detailed in my note.  MD Johns  31 y/o f presenting w/ ha x 3-4 weeks. Pt states mainly on left side but can also be on r side, intermittently has lights flashing when she wakes up, ha 6/10 not better or worse w/ anything in particular. At times takes apap for pain. Denies n/v, f/c, rash, pos neck pain going down left arm, no unilateral weakness, afebrile vitals stable, non toxic, neuro exam benign perrl, no steamy cornea, no ttp over temp area, no meningeal signs. pt qdoc'd, mri ordered to r/o vst offered pain meds pt does not want. awaiting mri read and neuro recs MD Johns:  patient seen and evaluated personally.   I agree with the History & Physical,  Impression & Plan other than what was detailed in my note.  MD Johns  see mdm

## 2021-02-03 NOTE — ED ADULT TRIAGE NOTE - CHIEF COMPLAINT QUOTE
headache, blurry vision, dizziness, loss of balance x4 months  left facial tingling x1 month  symptoms starting after

## 2021-02-03 NOTE — ED PROVIDER NOTE - CLINICAL SUMMARY MEDICAL DECISION MAKING FREE TEXT BOX
Attending Andreas:  29 y/o f presenting w/ ha x 3-4 weeks. Pt states mainly on left side but can also be on r side, intermittently has lights flashing when she wakes up, ha 6/10 not better or worse w/ anything in particular. At times takes apap for pain. Denies n/v, f/c, rash, pos neck pain going down left arm, no unilateral weakness, afebrile vitals stable, non toxic, neuro exam benign perrl, no steamy cornea, no ttp over temp area, no meningeal signs. pt qdoc'd, mri ordered to r/o vst offered pain meds pt does not want. awaiting mri read and neuro recs

## 2021-02-03 NOTE — ED PROVIDER NOTE - OBJECTIVE STATEMENT
29yo woman w/ c-sectino deliver 4 months ago p/w b/l temporal headache which is worse on left side and ongoing for about 1 month. Pt states that the headache is worst when she wakes up. It occurs with flashes of light and dizziness. Pt also endorses chest/ L. shoulder pain. Endorses b/l leg pins and needles. Pt was on OCPs but has not taken in a month. He neurologist Dr. Simmons sent her in for MRI/MRV of head. Denies n/v/f/c/diarrhea/dysuria/sob.

## 2021-02-03 NOTE — ED PROVIDER NOTE - NS ED ROS FT
REVIEW OF SYSTEMS:  CONSTITUTIONAL: No weakness, fevers or chills  EYES/ENT: No visual changes;  No vertigo or throat pain   NECK: No pain or stiffness  RESPIRATORY: No cough, wheezing, hemoptysis; No shortness of breath  CARDIOVASCULAR: +chest pain, no palpitations  GASTROINTESTINAL: No abdominal or epigastric pain. No nausea, vomiting, or hematemesis; No diarrhea or constipation. No melena or hematochezia.  GENITOURINARY: No dysuria, frequency or hematuria  NEUROLOGICAL: b/l temporal headache ->worse on left, b/l leg paresthesias, flashes of light  MSK: l. shoulder pain  SKIN: No itching, rashes

## 2021-02-03 NOTE — ED PROVIDER NOTE - RAPID ASSESSMENT
30 y.o F presents with HA x3-4 months associated with dizziness and blurry vision. reports endorses blurry vision every morning when she wakes up.     Scribe Statement: Keshia COMBS Tiffany, attest that this documentation has been prepared under the direction and in the presence of Vivi Fuller) 30 y.o F presents with HA x3-4 months associated with dizziness and blurry vision. reports endorses blurry vision every morning when she wakes up.     Scribe Statement: I, Darline Schmitt, attest that this documentation has been prepared under the direction and in the presence of Vivi Fuller)    Vivi Fuller - Attending Note: The scribe’s documentation has been prepared under my direction and personally reviewed by me.    Patient was rapidly assessed via Telemedicine encounter; a limited history and physical exam was performed. The patient will be seen and further worked up in the main emergency department and their care will be completed by the main emergency department team. Receiving team will follow up on labs, analgesia, any clinical imaging, and perform reassessment and disposition of the patient as clinically indicated. All decisions regarding the progression of care will be made at their discretion.

## 2021-02-04 VITALS
DIASTOLIC BLOOD PRESSURE: 65 MMHG | OXYGEN SATURATION: 100 % | TEMPERATURE: 98 F | RESPIRATION RATE: 18 BRPM | SYSTOLIC BLOOD PRESSURE: 103 MMHG | HEART RATE: 80 BPM

## 2021-02-04 LAB
A1C WITH ESTIMATED AVERAGE GLUCOSE RESULT: 5.4 % — SIGNIFICANT CHANGE UP (ref 4–5.6)
CHOLEST SERPL-MCNC: 227 MG/DL — HIGH
ESTIMATED AVERAGE GLUCOSE: 108 MG/DL — SIGNIFICANT CHANGE UP (ref 68–114)
HDLC SERPL-MCNC: 44 MG/DL — LOW
LIPID PNL WITH DIRECT LDL SERPL: 163 MG/DL — HIGH
NON HDL CHOLESTEROL: 182 MG/DL — HIGH
SARS-COV-2 RNA SPEC QL NAA+PROBE: SIGNIFICANT CHANGE UP
TRIGL SERPL-MCNC: 98 MG/DL — SIGNIFICANT CHANGE UP

## 2021-02-04 PROCEDURE — 99284 EMERGENCY DEPT VISIT MOD MDM: CPT | Mod: 25

## 2021-02-04 PROCEDURE — 83036 HEMOGLOBIN GLYCOSYLATED A1C: CPT

## 2021-02-04 PROCEDURE — 99234 HOSP IP/OBS SM DT SF/LOW 45: CPT

## 2021-02-04 PROCEDURE — G0378: CPT

## 2021-02-04 PROCEDURE — 85025 COMPLETE CBC W/AUTO DIFF WBC: CPT

## 2021-02-04 PROCEDURE — 80061 LIPID PANEL: CPT

## 2021-02-04 PROCEDURE — U0003: CPT

## 2021-02-04 PROCEDURE — 84484 ASSAY OF TROPONIN QUANT: CPT

## 2021-02-04 PROCEDURE — 93005 ELECTROCARDIOGRAM TRACING: CPT

## 2021-02-04 PROCEDURE — 72156 MRI NECK SPINE W/O & W/DYE: CPT

## 2021-02-04 PROCEDURE — U0005: CPT

## 2021-02-04 PROCEDURE — A9585: CPT

## 2021-02-04 PROCEDURE — 70545 MR ANGIOGRAPHY HEAD W/DYE: CPT

## 2021-02-04 PROCEDURE — 70553 MRI BRAIN STEM W/O & W/DYE: CPT

## 2021-02-04 PROCEDURE — 36415 COLL VENOUS BLD VENIPUNCTURE: CPT

## 2021-02-04 PROCEDURE — 80053 COMPREHEN METABOLIC PANEL: CPT

## 2021-02-04 PROCEDURE — 84702 CHORIONIC GONADOTROPIN TEST: CPT

## 2021-02-04 RX ORDER — LIDOCAINE 4 G/100G
1 CREAM TOPICAL ONCE
Refills: 0 | Status: COMPLETED | OUTPATIENT
Start: 2021-02-04 | End: 2021-02-04

## 2021-02-04 RX ORDER — ACETAMINOPHEN 500 MG
650 TABLET ORAL ONCE
Refills: 0 | Status: COMPLETED | OUTPATIENT
Start: 2021-02-04 | End: 2021-02-04

## 2021-02-04 RX ORDER — ACETAMINOPHEN 500 MG
650 TABLET ORAL EVERY 6 HOURS
Refills: 0 | Status: DISCONTINUED | OUTPATIENT
Start: 2021-02-04 | End: 2021-02-07

## 2021-02-04 RX ADMIN — Medication 650 MILLIGRAM(S): at 01:31

## 2021-02-04 RX ADMIN — LIDOCAINE 1 PATCH: 4 CREAM TOPICAL at 04:26

## 2021-02-04 RX ADMIN — LIDOCAINE 1 PATCH: 4 CREAM TOPICAL at 10:58

## 2021-02-04 NOTE — ED ADULT NURSE NOTE - OBJECTIVE STATEMENT
Pt A&Ox4, ambulatory with steady gait,. Pt presents to ED with c/o headache x3-4 months. Pt reports have c-sections with the headaches following soon after. Denies taking any medication for the headache. Pain is not constant, comes and goes. Reports that when she has the headache also endorses photophobia, N/V, dizziness and changes in her vision. Pt also c/o left sided chest discomfort which goes up left shoulder.   PIV placed with qdoc, labs drawn and sent. Pt sent to MRI, pending results. Neuro at bedside preforming assessment.   At current moment, pt denies SOB, N/V, dizziness, changes in vision, LOC. Resting comfortably.

## 2021-02-04 NOTE — ED CDU PROVIDER DISPOSITION NOTE - PATIENT PORTAL LINK FT
You can access the FollowMyHealth Patient Portal offered by [facility] by registering at the following website: [email or web address]. By joining [facility]’s FollowMyHealth portal, you will also be able to view your health information using other applications (apps) compatible with our system.

## 2021-02-04 NOTE — ED CDU PROVIDER INITIAL DAY NOTE - PHYSICAL EXAMINATION
GEN: Well Appearing, Nontoxic, NAD  HEENT: NC/AT, Symm Facies. EOMI  CV: No JVD/Bruits or stridor;  +S1S2, RRR w/o m/g/r. +chest wall tender to palpation   RESP: CTAB w/o w/r/r  L shoulder: No obvious deformity. Limited ROM 2/2 pain. Tender to palpation at the left medial border of scapula and left shoulder. +radial pulse    ABD: Soft, nt/nd, +BS. No guarding/rebound.  EXT/MSK: No lower extremity edema or calf tenderness. Otherwise moving extremities freely   PSYCH: Appropriate mood and affect   Neuro: AOX3 with normal speech. LUE 5/5 strength RUE 5/5 strength LLE 4/5 strength RLE 5/5 strength. Sensation decreased left cheek compared to right and decreased left foot compared to right foot, patient described as "heavy." No pronator drift. Normal finger to nose test. Steady gait. GEN: Well Appearing, Nontoxic, NAD  HEENT: NC/AT, Symm Facies. EOMI  CV: No JVD/Bruits or stridor;  +S1S2, RRR w/o m/g/r. +chest wall tender to palpation   RESP: CTAB no wheezing   L shoulder: No obvious deformity. Limited ROM 2/2 pain. Tender to palpation at the left medial border of scapula and left shoulder. +radial pulse    ABD: Soft, nt/nd, +BS. No guarding/rebound.  EXT/MSK: No lower extremity edema or calf tenderness. Otherwise moving extremities freely   PSYCH: Appropriate mood and affect   Neuro: AOX3 with normal speech. LUE 5/5 strength RUE 5/5 strength LLE 4/5 strength RLE 5/5 strength. Sensation decreased left cheek compared to right and decreased left foot compared to right foot, patient described as "heavy." No pronator drift. Normal finger to nose test. Steady gait.

## 2021-02-04 NOTE — ED CDU PROVIDER INITIAL DAY NOTE - PROGRESS NOTE DETAILS
Spoke with Dr. Johns, patient's EKG showed nonspecific T wave inversion. Trop <6. Pain with movement of L upper extremity and palpation of scapula/shoulder x 1 month. Given presentation and workup pain less likely cardiac in nature. Discussed medication patient can use to treat headache/pain, he recommended Lidoderm patch and Tylenol, given patient breast feeding at this time. Recommending, speaking with radiology given finding of disc herniation read on MRI prelim of concern for chord compression. Spoke with radiology, chord compression not suspected at this time, pending final read. - Candelaria Kirk PA-C Spoke with Dr. Johns, patient's EKG showed nonspecific T wave inversion. Trop <6. Pain with movement of L upper extremity and palpation of scapula/shoulder x 1 month. Given presentation and workup pain less likely cardiac in nature. Discussed medication patient can use to treat headache/pain, he recommended Lidoderm patch and Tylenol, given patient breast feeding at this time. Recommending, speaking with radiology given finding if disc herniation read on MRI prelim of concern for cord compression. Spoke with radiology, chord compression not suspected at this time, pending final read. - Candelaria Kirk PA-C Spoke with Dr. Johns, patient's EKG showed nonspecific T wave inversion. Trop <6. Pain with movement of L upper extremity and palpation of scapula/shoulder x 1 month. Given presentation and workup pain less likely cardiac in nature. Discussed medication patient can use to treat headache/pain, he recommended Lidoderm patch and Tylenol, given patient breast feeding at this time. Recommending, speaking with radiology given finding if disc herniation read on MRI prelim of concern for cord compression. Spoke with radiology, cord compression not suspected at this time, pending final read. - Candelaria Kirk PA-C Patient seen at bedside in NAD.  VSS.  Patient resting comfortably.  Reports that she is feeling much better.  Only slight "pinching" to the back of the head.  Denies new weakness/numbness.  Awaiting final MRI read.  -Alex Butler PA-C Patient seen at bedside in NAD.  VSS.  Patient resting comfortably without complaints. MRI showing c4-c5 disc herniation, otherwise unremarkable.  Patient evaluated by neuro and neuro surgery, recommending outpatient follow up.  Strict return precautions provided. Discharge instructions given via  [facility] #[id number] -Alex Butler PA-C

## 2021-02-04 NOTE — ED ADULT NURSE REASSESSMENT NOTE - NS ED NURSE REASSESS COMMENT FT1
12.30 Pt is Evaluated by CDU MD Michel Andrew  Pt is Discharged  ML out RUTHY Butler explained the follow up care & gave the discharge summary  Pt verbalized the understanding  Pt had stable vitals steady gait A&OX 4 at the time of discharge
07.00 Am Received the Pt from  ARIANE Yusuf . Pt is Observed for headache awaitng MRI results  . Received the Pt A&OX 4 obeys commands Brianna N/V/D fever chills cp SOB   Comfort care & safety measures continued  IV site looks clean & dry no signs of infiltration noted pt denies  pain IV site .  Pt is advised to call for help  call bell with in the reach pt verbalized the understanding .   pending CDU  MD villegas . GCS 15/15 A&OX 4 PERRLA  size 3 Strong upper & lower extremities steady gait   No facial droop  No Hand Leg drop denies numbness tingling Continue to monitor

## 2021-02-04 NOTE — ED CDU PROVIDER INITIAL DAY NOTE - NS ED ROS FT
Constitutional: No fever or chills  Eyes: + visual changes  CV: +chest pain No lower extremity edema  Resp: No SOB   GI: No abd pain. No nausea or vomiting. No diarrhea.   : No dysuria, hematuria.   MSK: +upper back/neck/L shoulder pain   Skin: No rash  Psych: No complaints   Neuro: + headache. +dizziness + numbness/ tingling.

## 2021-02-04 NOTE — ED ADULT NURSE NOTE - NS ED NOTE  TALK SOMEONE YN
"Chief Complaint   Patient presents with     Consult For     LT inguinal hernia       Initial /81 (BP Location: Right arm, Patient Position: Chair, Cuff Size: Adult Regular)  Pulse 80  Temp 97.9  F (36.6  C)  Resp 12  Ht 1.778 m (5' 10\")  Wt 79.8 kg (176 lb)  BMI 25.25 kg/m2 Estimated body mass index is 25.25 kg/(m^2) as calculated from the following:    Height as of this encounter: 1.778 m (5' 10\").    Weight as of this encounter: 79.8 kg (176 lb).  Medication Reconciliation: complete     Tanisha Rodriguez MA      " No

## 2021-02-04 NOTE — ED CDU PROVIDER INITIAL DAY NOTE - DETAILS
Headaches  -Vitals every 4 hours, frequent reevaluations, DW Masom and Neurology team  -Neuro checks q4 hours  -Tele  -Pending MRI final results  -Pain control, limited as patient is breast feeding

## 2021-02-04 NOTE — CONSULT NOTE ADULT - ASSESSMENT
SAGRARIO POLLOCKCHRISTIANE    31YO F in CDU initially for neurology w/u of IIH after 1 mo bitemporal HA, MRI/MRV neg, also c/o L shoulder and scapular pain/cramping. Neurology recd nsgy consult for concern for radiculopathy/cervical stenosis. MR Cspine w/no foraminal stenosis, only mild canal stenosis w/o cord impingement at C4-5 2/2 L paracentral disc bulge. No cord signal change. Exam: Neuro intact, no hoffmans, no clonus, plantar reflex downgoing b/l, no radiculopathy or numbness on exam.   - No e/o myelopathic signs or radiculopathy on exam, MR Cspine without cord compression or foraminal stenosis. No indication for further neurosurgical consult. If develops new neck pain, radicular pain, or neurological deficits can fu outpatient with Dr. Liriano as needed.   - Pain mgmt per primary

## 2021-02-04 NOTE — ED CDU PROVIDER DISPOSITION NOTE - NSFOLLOWUPINSTRUCTIONS_ED_ALL_ED_FT
1. Rest. Stay hydrated.   2. Continue your current home medications. You can take Tylenol 650mg every 6 hours as needed for headaches.   3. Follow-up with your medical doctor in 2-3 days.  Bring your results with you for follow-up.  4. Return to the ER if you have any new or worsening symptoms, chest pain, difficulty breathing, fevers, chills, weakness, or any other concerns.    **NEURO RECCS 1. Rest. Stay hydrated.   2. Continue your current home medications. You can take Tylenol 650mg every 6 hours as needed for headaches.   3. Follow-up with your medical doctor and neurologist in 2-3 days.  Bring your results with you for follow-up.  4. Return to the ER if you have any new or worsening symptoms, chest pain, difficulty breathing, fevers, chills, weakness, or any other concerns.

## 2021-02-04 NOTE — CONSULT NOTE ADULT - SUBJECTIVE AND OBJECTIVE BOX
p ([id number])     HPI:  29yo woman w/ c-sectino deliver 4 months ago p/w b/l temporal headache which is worse on left side and ongoing for about 1 month. Pt states that the headache is worst when she wakes up. It occurs with flashes of light and dizziness. Pt also endorses chest/ L. shoulder pain. Endorses b/l leg pins and needles. Pt was on OCPs but has not taken in a month. He neurologist Dr. Simmons sent her in for MRI/MRV of head. Denies n/v/f/c/diarrhea/dysuria/sob.    Imaging:   MR C: Straightening of the usual cervical lordosis without significant listhesis. At C4-C5, there is a left central disc bulge with superimposed disc extrusion with mild superior migration and mild to moderate central canal narrowing with abutment of the cord. There is no abnormal signal intensity in the spinal cord.    Exam:   Neuro intact, no hoffmans, no clonus, plantar reflex downgoinb b/l        --Anticoagulation:    =====================  PAST MEDICAL HISTORY   Asthma      PAST SURGICAL HISTORY   H/O:       penicillin (Short breath; Urticaria)      MEDICATIONS:  Antibiotics:    Neuro:  acetaminophen   Tablet .. 650 milliGRAM(s) Oral every 6 hours PRN    Other:      SOCIAL HISTORY:   Occupation:   Marital Status:     FAMILY HISTORY:  No pertinent family history in first degree relatives        ROS: Negative except per HPI    LABS:                          13.8   10.98 )-----------( 219      ( 2021 19:02 )             42.3     02-03    141  |  105  |  16  ----------------------------<  140<H>  3.6   |  22  |  0.58    Ca    9.4      2021 19:02    TPro  8.0  /  Alb  4.2  /  TBili  0.6  /  DBili  x   /  AST  35  /  ALT  46<H>  /  AlkPhos  137<H>

## 2021-02-04 NOTE — ED CDU PROVIDER INITIAL DAY NOTE - MEDICAL DECISION MAKING DETAILS
Attending Andreas:  31 y/o f presenting w/ ha x 3-4 weeks. Pt states mainly on left side but can also be on r side, intermittently has lights flashing when she wakes up, ha 6/10 not better or worse w/ anything in particular. At times takes apap for pain. Denies n/v, f/c, rash, pos neck pain going down left arm, no unilateral weakness, afebrile vitals stable, non toxic, neuro exam benign perrl, no steamy cornea, no ttp over temp area, no meningeal signs. pt qdoc'd, MRI ordered. Seen by neuro .Recommened CDU for fu of mri results, continued neuro checks .

## 2021-02-04 NOTE — ED CDU PROVIDER INITIAL DAY NOTE - OBJECTIVE STATEMENT
31yo woman w/  deliver 4 months ago, currently breast feeding  presents with b/l intermittent temporal headache L>R x1 month sent from neurology for MRI/MRV. Pt states that the headache is worst when she wakes up, occurring with flashes of light and dizziness. Patient also complains of cheek tingling L sided and L foot heaviness. Pt also endorses L sided upper back pain, neck pain, and L. shoulder pain, with midsternal chest pain x 1 month. Pain worse to touch and movement of body/arm. Mild relief with massaging area and creams. Endorses intermittent cough associated with chest discomfort.  Pt was started on OCPs 2 months post  and stopped after a month when these symptoms developed.  Denies nausea, vomiting, fever, diarrhea, urinary complaints, trauma to neck. Neurologist: Dr. Simmons.  ED course: EKG with nonspecific T wave inversions, Trop<6. MRI/MRV prelim "No acute infarct, intracranial hemorrhage, focal signal abnormality or abnormal intracranial enhancement. No dural venous sinus thrombosis. Dominant left transvere/sigmoid sinus. High riding left jugular bulb. No focal cord signal abnormality or enhancement. 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 Plan for neuro checks, tele, pain control, final MRI read, with neurology following in CDU. 29yo woman PMH Asthma, w/  deliver 4 months ago, currently breast feeding  presents with b/l intermittent temporal headache L>R x1 month sent from neurology for MRI/MRV. Pt states that the headache is worst when she wakes up, occurring with flashes of light and dizziness. Patient also complains of cheek tingling L sided and L foot heaviness. Pt also endorses L sided upper back pain, neck pain, and L. shoulder pain, with midsternal chest pain x 1 month. Pain worse to touch and movement of body/arm. Mild relief with massaging area and creams. Endorses intermittent cough associated with chest discomfort.  Pt was started on OCPs 2 months post  and stopped after a month when these symptoms developed.  Denies nausea, vomiting, fever, diarrhea, urinary complaints, trauma to neck. Neurologist: Dr. Simmons.  ED course: EKG with nonspecific T wave inversions, Trop<6. MRI/MRV prelim "No acute infarct, intracranial hemorrhage, focal signal abnormality or abnormal intracranial enhancement. No dural venous sinus thrombosis. Dominant left transvere/sigmoid sinus. High riding left jugular bulb. No focal cord signal abnormality or enhancement. 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 Plan for neuro checks, tele, pain control, final MRI read, with neurology following in CDU.

## 2021-02-04 NOTE — ED CDU PROVIDER DISPOSITION NOTE - CLINICAL COURSE
29yo woman PMH Asthma, w/  deliver 4 months ago, currently breast feeding  presents with b/l intermittent temporal headache L>R x1 month sent from neurology for MRI/MRV. Pt states that the headache is worst when she wakes up, occurring with flashes of light and dizziness. Patient also complains of cheek tingling L sided and L foot heaviness. Pt also endorses L sided upper back pain, neck pain, and L. shoulder pain, with midsternal chest pain x 1 month. Pain worse to touch and movement of body/arm. Mild relief with massaging area and creams. Endorses intermittent cough associated with chest discomfort.  Pt was started on OCPs 2 months post  and stopped after a month when these symptoms developed.  Denies nausea, vomiting, fever, diarrhea, urinary complaints, trauma to neck. Neurologist: Dr. Simmons.  ED course: EKG with nonspecific T wave inversions, Trop<6. MRI/MRV prelim "No acute infarct, intracranial hemorrhage, focal signal abnormality or abnormal intracranial enhancement. No dural venous sinus thrombosis. Dominant left transvere/sigmoid sinus. High riding left jugular bulb. No focal cord signal abnormality or enhancement. 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 Plan for neuro checks, tele, pain control, final MRI read, with neurology following in CDU. 31yo woman PMH Asthma, w/  deliver 4 months ago, currently breast feeding  presents with b/l intermittent temporal headache L>R x1 month sent from neurology for MRI/MRV. Pt states that the headache is worst when she wakes up, occurring with flashes of light and dizziness. Patient also complains of cheek tingling L sided and L foot heaviness. Pt also endorses L sided upper back pain, neck pain, and L. shoulder pain, with midsternal chest pain x 1 month. Pain worse to touch and movement of body/arm. Mild relief with massaging area and creams. Endorses intermittent cough associated with chest discomfort.  Pt was started on OCPs 2 months post  and stopped after a month when these symptoms developed.  Denies nausea, vomiting, fever, diarrhea, urinary complaints, trauma to neck. Neurologist: Dr. Simmons.  ED course: EKG with nonspecific T wave inversions, Trop<6. MRI/MRV prelim "No acute infarct, intracranial hemorrhage, focal signal abnormality or abnormal intracranial enhancement. No dural venous sinus thrombosis. Dominant left transvere/sigmoid sinus. High riding left jugular bulb. No focal cord signal abnormality or enhancement. 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 Plan for neuro checks, tele, pain control, final MRI read, with neurology following in CDU.  Patient symptoms improved in the CDU overnight.  MRI showing c4-c5 disc herniation, otherwise unremarkable.  Patient evaluated by neuro and neuro surgery, recommending outpatient follow up.  Strict return precautions provided.

## 2023-08-30 PROBLEM — J45.909 UNSPECIFIED ASTHMA, UNCOMPLICATED: Chronic | Status: ACTIVE | Noted: 2021-02-04

## 2023-10-19 ENCOUNTER — OUTPATIENT (OUTPATIENT)
Dept: OUTPATIENT SERVICES | Facility: HOSPITAL | Age: 33
LOS: 1 days | Discharge: STILL ADMITTED OR ADMINISTRATIVE | End: 2023-10-19
Payer: MEDICAID

## 2023-10-19 DIAGNOSIS — Z98.891 HISTORY OF UTERINE SCAR FROM PREVIOUS SURGERY: Chronic | ICD-10-CM

## 2023-10-19 PROCEDURE — 99215 OFFICE O/P EST HI 40 MIN: CPT

## 2023-10-19 PROCEDURE — 90839 PSYTX CRISIS INITIAL 60 MIN: CPT

## 2023-11-09 ENCOUNTER — INPATIENT (INPATIENT)
Facility: HOSPITAL | Age: 33
LOS: 4 days | Discharge: PSYCHIATRIC OR SUBSTANCE USE FACILITY | End: 2023-11-14
Attending: STUDENT IN AN ORGANIZED HEALTH CARE EDUCATION/TRAINING PROGRAM | Admitting: STUDENT IN AN ORGANIZED HEALTH CARE EDUCATION/TRAINING PROGRAM
Payer: MEDICAID

## 2023-11-09 VITALS
TEMPERATURE: 99 F | RESPIRATION RATE: 16 BRPM | OXYGEN SATURATION: 100 % | DIASTOLIC BLOOD PRESSURE: 87 MMHG | SYSTOLIC BLOOD PRESSURE: 144 MMHG | HEART RATE: 86 BPM

## 2023-11-09 DIAGNOSIS — Z98.891 HISTORY OF UTERINE SCAR FROM PREVIOUS SURGERY: Chronic | ICD-10-CM

## 2023-11-09 DIAGNOSIS — F32.2 MAJOR DEPRESSIVE DISORDER, SINGLE EPISODE, SEVERE WITHOUT PSYCHOTIC FEATURES: ICD-10-CM

## 2023-11-09 LAB
ALBUMIN SERPL ELPH-MCNC: 4.4 G/DL — SIGNIFICANT CHANGE UP (ref 3.3–5)
ALBUMIN SERPL ELPH-MCNC: 4.4 G/DL — SIGNIFICANT CHANGE UP (ref 3.3–5)
ALP SERPL-CCNC: 76 U/L — SIGNIFICANT CHANGE UP (ref 40–120)
ALP SERPL-CCNC: 76 U/L — SIGNIFICANT CHANGE UP (ref 40–120)
ALT FLD-CCNC: 17 U/L — SIGNIFICANT CHANGE UP (ref 4–33)
ALT FLD-CCNC: 17 U/L — SIGNIFICANT CHANGE UP (ref 4–33)
ANION GAP SERPL CALC-SCNC: 10 MMOL/L — SIGNIFICANT CHANGE UP (ref 7–14)
ANION GAP SERPL CALC-SCNC: 10 MMOL/L — SIGNIFICANT CHANGE UP (ref 7–14)
APAP SERPL-MCNC: <10 UG/ML — LOW (ref 15–25)
APAP SERPL-MCNC: <10 UG/ML — LOW (ref 15–25)
AST SERPL-CCNC: 18 U/L — SIGNIFICANT CHANGE UP (ref 4–32)
AST SERPL-CCNC: 18 U/L — SIGNIFICANT CHANGE UP (ref 4–32)
BASOPHILS # BLD AUTO: 0.05 K/UL — SIGNIFICANT CHANGE UP (ref 0–0.2)
BASOPHILS # BLD AUTO: 0.05 K/UL — SIGNIFICANT CHANGE UP (ref 0–0.2)
BASOPHILS NFR BLD AUTO: 0.7 % — SIGNIFICANT CHANGE UP (ref 0–2)
BASOPHILS NFR BLD AUTO: 0.7 % — SIGNIFICANT CHANGE UP (ref 0–2)
BILIRUB SERPL-MCNC: 0.4 MG/DL — SIGNIFICANT CHANGE UP (ref 0.2–1.2)
BILIRUB SERPL-MCNC: 0.4 MG/DL — SIGNIFICANT CHANGE UP (ref 0.2–1.2)
BUN SERPL-MCNC: 21 MG/DL — SIGNIFICANT CHANGE UP (ref 7–23)
BUN SERPL-MCNC: 21 MG/DL — SIGNIFICANT CHANGE UP (ref 7–23)
CALCIUM SERPL-MCNC: 9.1 MG/DL — SIGNIFICANT CHANGE UP (ref 8.4–10.5)
CALCIUM SERPL-MCNC: 9.1 MG/DL — SIGNIFICANT CHANGE UP (ref 8.4–10.5)
CHLORIDE SERPL-SCNC: 103 MMOL/L — SIGNIFICANT CHANGE UP (ref 98–107)
CHLORIDE SERPL-SCNC: 103 MMOL/L — SIGNIFICANT CHANGE UP (ref 98–107)
CO2 SERPL-SCNC: 25 MMOL/L — SIGNIFICANT CHANGE UP (ref 22–31)
CO2 SERPL-SCNC: 25 MMOL/L — SIGNIFICANT CHANGE UP (ref 22–31)
CREAT SERPL-MCNC: 0.62 MG/DL — SIGNIFICANT CHANGE UP (ref 0.5–1.3)
CREAT SERPL-MCNC: 0.62 MG/DL — SIGNIFICANT CHANGE UP (ref 0.5–1.3)
EGFR: 122 ML/MIN/1.73M2 — SIGNIFICANT CHANGE UP
EGFR: 122 ML/MIN/1.73M2 — SIGNIFICANT CHANGE UP
EOSINOPHIL # BLD AUTO: 0.45 K/UL — SIGNIFICANT CHANGE UP (ref 0–0.5)
EOSINOPHIL # BLD AUTO: 0.45 K/UL — SIGNIFICANT CHANGE UP (ref 0–0.5)
EOSINOPHIL NFR BLD AUTO: 5.9 % — SIGNIFICANT CHANGE UP (ref 0–6)
EOSINOPHIL NFR BLD AUTO: 5.9 % — SIGNIFICANT CHANGE UP (ref 0–6)
ETHANOL SERPL-MCNC: <10 MG/DL — SIGNIFICANT CHANGE UP
ETHANOL SERPL-MCNC: <10 MG/DL — SIGNIFICANT CHANGE UP
GLUCOSE SERPL-MCNC: 84 MG/DL — SIGNIFICANT CHANGE UP (ref 70–99)
GLUCOSE SERPL-MCNC: 84 MG/DL — SIGNIFICANT CHANGE UP (ref 70–99)
HCT VFR BLD CALC: 43.1 % — SIGNIFICANT CHANGE UP (ref 34.5–45)
HCT VFR BLD CALC: 43.1 % — SIGNIFICANT CHANGE UP (ref 34.5–45)
HGB BLD-MCNC: 14.4 G/DL — SIGNIFICANT CHANGE UP (ref 11.5–15.5)
HGB BLD-MCNC: 14.4 G/DL — SIGNIFICANT CHANGE UP (ref 11.5–15.5)
IANC: 4.49 K/UL — SIGNIFICANT CHANGE UP (ref 1.8–7.4)
IANC: 4.49 K/UL — SIGNIFICANT CHANGE UP (ref 1.8–7.4)
IMM GRANULOCYTES NFR BLD AUTO: 0.3 % — SIGNIFICANT CHANGE UP (ref 0–0.9)
IMM GRANULOCYTES NFR BLD AUTO: 0.3 % — SIGNIFICANT CHANGE UP (ref 0–0.9)
LYMPHOCYTES # BLD AUTO: 2.06 K/UL — SIGNIFICANT CHANGE UP (ref 1–3.3)
LYMPHOCYTES # BLD AUTO: 2.06 K/UL — SIGNIFICANT CHANGE UP (ref 1–3.3)
LYMPHOCYTES # BLD AUTO: 27 % — SIGNIFICANT CHANGE UP (ref 13–44)
LYMPHOCYTES # BLD AUTO: 27 % — SIGNIFICANT CHANGE UP (ref 13–44)
MCHC RBC-ENTMCNC: 29.3 PG — SIGNIFICANT CHANGE UP (ref 27–34)
MCHC RBC-ENTMCNC: 29.3 PG — SIGNIFICANT CHANGE UP (ref 27–34)
MCHC RBC-ENTMCNC: 33.4 GM/DL — SIGNIFICANT CHANGE UP (ref 32–36)
MCHC RBC-ENTMCNC: 33.4 GM/DL — SIGNIFICANT CHANGE UP (ref 32–36)
MCV RBC AUTO: 87.8 FL — SIGNIFICANT CHANGE UP (ref 80–100)
MCV RBC AUTO: 87.8 FL — SIGNIFICANT CHANGE UP (ref 80–100)
MONOCYTES # BLD AUTO: 0.55 K/UL — SIGNIFICANT CHANGE UP (ref 0–0.9)
MONOCYTES # BLD AUTO: 0.55 K/UL — SIGNIFICANT CHANGE UP (ref 0–0.9)
MONOCYTES NFR BLD AUTO: 7.2 % — SIGNIFICANT CHANGE UP (ref 2–14)
MONOCYTES NFR BLD AUTO: 7.2 % — SIGNIFICANT CHANGE UP (ref 2–14)
NEUTROPHILS # BLD AUTO: 4.49 K/UL — SIGNIFICANT CHANGE UP (ref 1.8–7.4)
NEUTROPHILS # BLD AUTO: 4.49 K/UL — SIGNIFICANT CHANGE UP (ref 1.8–7.4)
NEUTROPHILS NFR BLD AUTO: 58.9 % — SIGNIFICANT CHANGE UP (ref 43–77)
NEUTROPHILS NFR BLD AUTO: 58.9 % — SIGNIFICANT CHANGE UP (ref 43–77)
NRBC # BLD: 0 /100 WBCS — SIGNIFICANT CHANGE UP (ref 0–0)
NRBC # BLD: 0 /100 WBCS — SIGNIFICANT CHANGE UP (ref 0–0)
NRBC # FLD: 0 K/UL — SIGNIFICANT CHANGE UP (ref 0–0)
NRBC # FLD: 0 K/UL — SIGNIFICANT CHANGE UP (ref 0–0)
PLATELET # BLD AUTO: 174 K/UL — SIGNIFICANT CHANGE UP (ref 150–400)
PLATELET # BLD AUTO: 174 K/UL — SIGNIFICANT CHANGE UP (ref 150–400)
POTASSIUM SERPL-MCNC: 3.7 MMOL/L — SIGNIFICANT CHANGE UP (ref 3.5–5.3)
POTASSIUM SERPL-MCNC: 3.7 MMOL/L — SIGNIFICANT CHANGE UP (ref 3.5–5.3)
POTASSIUM SERPL-SCNC: 3.7 MMOL/L — SIGNIFICANT CHANGE UP (ref 3.5–5.3)
POTASSIUM SERPL-SCNC: 3.7 MMOL/L — SIGNIFICANT CHANGE UP (ref 3.5–5.3)
PROT SERPL-MCNC: 7.5 G/DL — SIGNIFICANT CHANGE UP (ref 6–8.3)
PROT SERPL-MCNC: 7.5 G/DL — SIGNIFICANT CHANGE UP (ref 6–8.3)
RBC # BLD: 4.91 M/UL — SIGNIFICANT CHANGE UP (ref 3.8–5.2)
RBC # BLD: 4.91 M/UL — SIGNIFICANT CHANGE UP (ref 3.8–5.2)
RBC # FLD: 14.1 % — SIGNIFICANT CHANGE UP (ref 10.3–14.5)
RBC # FLD: 14.1 % — SIGNIFICANT CHANGE UP (ref 10.3–14.5)
SALICYLATES SERPL-MCNC: <0.3 MG/DL — LOW (ref 15–30)
SALICYLATES SERPL-MCNC: <0.3 MG/DL — LOW (ref 15–30)
SARS-COV-2 RNA SPEC QL NAA+PROBE: SIGNIFICANT CHANGE UP
SARS-COV-2 RNA SPEC QL NAA+PROBE: SIGNIFICANT CHANGE UP
SODIUM SERPL-SCNC: 138 MMOL/L — SIGNIFICANT CHANGE UP (ref 135–145)
SODIUM SERPL-SCNC: 138 MMOL/L — SIGNIFICANT CHANGE UP (ref 135–145)
TSH SERPL-MCNC: 1.48 UIU/ML — SIGNIFICANT CHANGE UP (ref 0.27–4.2)
TSH SERPL-MCNC: 1.48 UIU/ML — SIGNIFICANT CHANGE UP (ref 0.27–4.2)
WBC # BLD: 7.62 K/UL — SIGNIFICANT CHANGE UP (ref 3.8–10.5)
WBC # BLD: 7.62 K/UL — SIGNIFICANT CHANGE UP (ref 3.8–10.5)
WBC # FLD AUTO: 7.62 K/UL — SIGNIFICANT CHANGE UP (ref 3.8–10.5)
WBC # FLD AUTO: 7.62 K/UL — SIGNIFICANT CHANGE UP (ref 3.8–10.5)

## 2023-11-09 PROCEDURE — 99285 EMERGENCY DEPT VISIT HI MDM: CPT

## 2023-11-09 NOTE — ED ADULT TRIAGE NOTE - CHIEF COMPLAINT QUOTE
pt ambulated into triage with  [phone]. stating she took for pills of Wellbutrin at 150mg at attempt to suicide. C/O Heacache, dizziness, and blurred vision. endorses associated nausea, and numbness and tingling in B/L lower extremities. hx of anxiety and depression with SI attempts in the past. pt denies HI, auditory and visual hallucinations.

## 2023-11-09 NOTE — CONSULT NOTE ADULT - PROBLEM SELECTOR RECOMMENDATION 9
MD Andrea: bupropion overdoses can cause tachycardia, prolonged QT, and seizures.  The single unifying predictor of the latter 2 is sustained tachycardia more than 12 hours after ingestion.    If patient does not exhibit tachycardia, rigidity, clonus, agitation or seizure activity within 12 hours of the reported overdose that he can be medically cleared from a toxicologic standpoint.

## 2023-11-09 NOTE — ED ADULT NURSE NOTE - CHIEF COMPLAINT QUOTE
pt ambulated into triage with  [phone]. stating she took for pills of Wellbutrin at 150mg at attempt to suicide. C/O Heacache, dizziness, and blurred vision. endorses associated nausea, and numbness and tingling in B/L lower extremities. hx of anxiety and depression with SI attempts in the past. pt denies HI, auditory and visual hallucinations.

## 2023-11-09 NOTE — ED PROVIDER NOTE - CLINICAL SUMMARY MEDICAL DECISION MAKING FREE TEXT BOX
33 year old female with PMH significant for depression, fibromyalgia, thyroid cancer s/p resection, and asthma presenting to the ED after overdosing on Buproprion in a suicide attempt. Will obtain EKG, CBC, CMP, TSH. Will place patient on CO and obtain acetaminophen and blood alcohol level. Will consult toxicology for further management

## 2023-11-09 NOTE — CONSULT NOTE ADULT - ASSESSMENT
Toxicologic Context   Bupropion is an dopamine and norepinephrine reuptake inhibitor with QRS prolonging and serotonergic agonism effects. In overdose, patients could develop QRS prolongation and seizures which may be delayed up to 24 hours post-ingestion. QRS prolongation may not respond to sodium bicarbonate as mechanism is via cardiac gap junction interference. Patients can also develop serotonergic toxicity especially in conjunction with other serotonergic agonists (AMS, hypertension, tachycardia, hyperthermia, neuromuscular excitation such as clonus, opsoclonus tremors, hyperflexia).    Recommendations  - Supportive care, please observe for 8 hours from time of ingestion and monitor for signs of QRS prolongation, seizures, serotonin toxicity.   - Obtain routine toxicological labs including CBC, CMP, serum acetaminophen, salicylate, ethanol, and EKG  - If asymptomatic with normal vitals signs and labs at end of observation period, cleared from acute toxicological standpoint  - Further medical and/or psychiatric care per primary team    Thank you for involving us in the care of this patient. Assessment and plan discussed with toxicology attending Dr. Mark Andrea. Please do not hesitate to reach out to the toxicology team for any further questions or concerns.    The On-Call Toxicology Fellow can be reached 24/7 via Pager #[phone]  Please send a 10 digit call back # as [place] cover multiple hospitals    Mateo Zamorano MD  Toxicology Fellow  PGY-4

## 2023-11-09 NOTE — ED PROVIDER NOTE - OBJECTIVE STATEMENT
33 year old female with PMH significant for depression, fibromyalgia, thyroid cancer s/p resection, and asthma presenting to the ED after a suicide attempt. Patient states that at 4pm today overdosed on buproprion in an SI attempt. Patient reports taking 4 tablets of bupropion 150mg. Patient denied ingesting any other substance including prescriptions and OTC medications. Patient states that she had a previous SI attempt 1 month ago, but did not go the ED for it. Patient states she has been feeling depressed for a while now and had increasing thoughts of SI. At this time, patient states that she has nausea, vomiting, lower extremity numbness and perioral numbness, and a headache. 33 year old female with PMH significant for depression, fibromyalgia, thyroid cancer s/p resection, and asthma presenting to the ED after a suicide attempt. Patient states that at 4pm today overdosed on buproprion in an SI attempt. Patient reports taking 4 tablets of bupropion 150mg. Patient denied ingesting any other substance including prescriptions and OTC medications. Patient states that she had a previous SI attempt 1 month ago, but did not go the ED for it. Patient states she has been feeling depressed for a while now and had increasing thoughts of SI. At this time, patient states that she has nausea, vomiting, lower extremity numbness and perioral numbness, and a headache.    Attendin-year-old female presents status post intentional overdose and suicide attempt.  Patient states she was feeling depressed.  She took four 150 mg bupropion tablets around 4 PM today she had about 1 hour before that she took 1 Lyrica tablet.

## 2023-11-09 NOTE — ED ADULT NURSE NOTE - OBJECTIVE STATEMENT
patient  primarily [place] speaking as per  patient c/o feeling depressed and took 4 tablets of Wellbutrin today. patient also states she developed fibromyalgia after having thyroid cancer and that made her depressed. h/o S/I attempt last month. breathing even and unlabored. skin warm and dry. connected to CM shows NSR. MD by bed side evaluating the patient. will cont to monitor.

## 2023-11-09 NOTE — ED PROVIDER NOTE - NS ED ROS FT
REVIEW OF SYSTEMS:    CONSTITUTIONAL: No weakness, fevers or chills, + headache   EYES/ENT: No visual changes;  No vertigo or throat pain   NECK: No pain or stiffness  RESPIRATORY: No cough, wheezing, hemoptysis; No shortness of breath  CARDIOVASCULAR: No chest pain or palpitations  GASTROINTESTINAL: + midepigastric pain. + nausea, + vomiting, or hematemesis; No diarrhea or constipation. No melena or hematochezia.  GENITOURINARY: No dysuria, frequency or hematuria  NEUROLOGICAL: + perioral and lower extremity numbness, no weakness  All other review of systems is negative unless indicated above.
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
## 2023-11-09 NOTE — ED ADULT NURSE NOTE - NSFALLUNIVINTERV_ED_ALL_ED
Bed/Stretcher in lowest position, wheels locked, appropriate side rails in place/Call bell, personal items and telephone in reach/Instruct patient to call for assistance before getting out of bed/chair/stretcher/Non-slip footwear applied when patient is off stretcher/[place] to call system/Physically safe environment - no spills, clutter or unnecessary equipment/Purposeful proactive rounding/Room/bathroom lighting operational, light cord in reach

## 2023-11-09 NOTE — ED PROVIDER NOTE - PROGRESS NOTE DETAILS
Carlton BURNETT: PT signed out to me.  She is 8 hrs s/p wellbutrin ingestion, no events on tele monitor.  Upon reassessment she cont to c/o headache, bilateral foot pain, leg pain.  Offered tylenol for pain, but now c/o nausea and retching unable to tolerate PO.  Will admit to medicine as pt cont to be symptomatic, cont constant observation as she is not yet psychiatrically clear.

## 2023-11-09 NOTE — CONSULT NOTE ADULT - SUBJECTIVE AND OBJECTIVE BOX
MEDICAL TOXICOLOGY CONSULT    HPI:  33 year old female with past medical history of depression, anxiety, thyroid cancer s/p resection on Levothyroxine, asthma, and fibromyalgia who presented to [facility] ED s/p intentional overdose that occurred at 1600 today. As per patient, she states that she was having a panic attack and had thoughts of wanting to kill herself and decided to take 4 pills of her Bupropion XL 150mg. Pt regretted her decision and told her  and self induced vomiting 30 minutes later, noting food contents as well as pill fragments.  brought her to the ED for further evaluation. She is complaining of headache and lightheadedness. No seizures reported over the past 6 hours. Pt denies chest pain, SOB, nausea, abdominal pain, changes in vision, changes in speech, numbness, weakness. Vitals are as follows: /93, RR 16, HR 83, T 98.6 orally, SpO2 98% on room air. Physical exam is within normal limits.     ONSET / TIME of exposure(s): 1600     QUANTITY of exposure(s): 4 Bupropion 150 mg XL     ROUTE of exposure:  Ingestion     CONTEXT of exposure: Intentional     ASSOCIATED symptoms: headache, lightheadedness     PAST MEDICAL & SURGICAL HISTORY:  Asthma      Fibromyalgia      H/O:           MEDICATION HISTORY:      FAMILY HISTORY:  No pertinent family history in first degree relatives        REVIEW OF SYSTEMS:   As per ED provider       Vital Signs Last 24 Hrs  T(C): 37 (2023 18:46), Max: 37 (2023 18:46)  T(F): 98.6 (2023 18:46), Max: 98.6 (2023 18:46)  HR: 83 (2023 20:43) (80 - 86)  BP: 138/93 (2023 20:43) (138/93 - 147/94)  BP(mean): --  RR: 16 (2023 20:43) (16 - 16)  SpO2: 98% (2023 20:43) (98% - 100%)    Parameters below as of 2023 20:43  Patient On (Oxygen Delivery Method): room air        SIGNIFICANT LABORATORY STUDIES:                        14.4   7.62  )-----------( 174      ( 2023 20:35 )             43.1       11-    138  |  103  |  21  ----------------------------<  84  3.7   |  25  |  0.62    Ca    9.1      2023 20:35    TPro  7.5  /  Alb  4.4  /  TBili  0.4  /  DBili  x   /  AST  18  /  ALT  17  /  AlkPhos  76        Gluc: 84 mg/dL   Anion Gap: 10  @ 20:35  Aspirin Level: <0.3<L>   @ 20:35  Acetaminophen Level:  <10<L>   @ 20:35  Ethanol Level:  <10   @ 20:35

## 2023-11-09 NOTE — ED PROVIDER NOTE - PHYSICAL EXAMINATION
General: NAD, well groomed, well developed   Head: atraumatic, normocephalic   Eyes: pupils equal, round, reactive to light, anicteric sclera   ENMT: moist mucous membranes   Neck: no JVD, no tender lymphadenopathy   Lungs: clear lung fields bilaterally, no wheezes, rales, or rhonchi   Heart: regular rate and rhythm, normal s1,s2, no murmurs, rubs, or gallops   Abdomen: soft, non-distended, slight tenderness to palpation along midepigastrium, normoactive bowel sounds   Extremities: warm, well perfused no lower extremity edema   Neuro: AOX3, no focal neurological deficits

## 2023-11-10 DIAGNOSIS — T14.91XA SUICIDE ATTEMPT, INITIAL ENCOUNTER: ICD-10-CM

## 2023-11-10 DIAGNOSIS — Z29.9 ENCOUNTER FOR PROPHYLACTIC MEASURES, UNSPECIFIED: ICD-10-CM

## 2023-11-10 DIAGNOSIS — J45.909 UNSPECIFIED ASTHMA, UNCOMPLICATED: ICD-10-CM

## 2023-11-10 DIAGNOSIS — Z78.9 OTHER SPECIFIED HEALTH STATUS: ICD-10-CM

## 2023-11-10 DIAGNOSIS — N39.0 URINARY TRACT INFECTION, SITE NOT SPECIFIED: ICD-10-CM

## 2023-11-10 DIAGNOSIS — F32.9 MAJOR DEPRESSIVE DISORDER, SINGLE EPISODE, UNSPECIFIED: ICD-10-CM

## 2023-11-10 DIAGNOSIS — M79.7 FIBROMYALGIA: ICD-10-CM

## 2023-11-10 DIAGNOSIS — F41.9 ANXIETY DISORDER, UNSPECIFIED: ICD-10-CM

## 2023-11-10 DIAGNOSIS — E03.9 HYPOTHYROIDISM, UNSPECIFIED: ICD-10-CM

## 2023-11-10 LAB
ANION GAP SERPL CALC-SCNC: 9 MMOL/L — SIGNIFICANT CHANGE UP (ref 7–14)
ANION GAP SERPL CALC-SCNC: 9 MMOL/L — SIGNIFICANT CHANGE UP (ref 7–14)
APPEARANCE UR: ABNORMAL
APPEARANCE UR: ABNORMAL
BACTERIA # UR AUTO: ABNORMAL /HPF
BACTERIA # UR AUTO: ABNORMAL /HPF
BILIRUB UR-MCNC: NEGATIVE — SIGNIFICANT CHANGE UP
BILIRUB UR-MCNC: NEGATIVE — SIGNIFICANT CHANGE UP
BUN SERPL-MCNC: 14 MG/DL — SIGNIFICANT CHANGE UP (ref 7–23)
BUN SERPL-MCNC: 14 MG/DL — SIGNIFICANT CHANGE UP (ref 7–23)
CALCIUM SERPL-MCNC: 8.5 MG/DL — SIGNIFICANT CHANGE UP (ref 8.4–10.5)
CALCIUM SERPL-MCNC: 8.5 MG/DL — SIGNIFICANT CHANGE UP (ref 8.4–10.5)
CAST: 0 /LPF — SIGNIFICANT CHANGE UP (ref 0–4)
CAST: 0 /LPF — SIGNIFICANT CHANGE UP (ref 0–4)
CHLORIDE SERPL-SCNC: 104 MMOL/L — SIGNIFICANT CHANGE UP (ref 98–107)
CHLORIDE SERPL-SCNC: 104 MMOL/L — SIGNIFICANT CHANGE UP (ref 98–107)
CO2 SERPL-SCNC: 23 MMOL/L — SIGNIFICANT CHANGE UP (ref 22–31)
CO2 SERPL-SCNC: 23 MMOL/L — SIGNIFICANT CHANGE UP (ref 22–31)
COLOR SPEC: YELLOW — SIGNIFICANT CHANGE UP
COLOR SPEC: YELLOW — SIGNIFICANT CHANGE UP
CREAT SERPL-MCNC: 0.55 MG/DL — SIGNIFICANT CHANGE UP (ref 0.5–1.3)
CREAT SERPL-MCNC: 0.55 MG/DL — SIGNIFICANT CHANGE UP (ref 0.5–1.3)
DIFF PNL FLD: NEGATIVE — SIGNIFICANT CHANGE UP
DIFF PNL FLD: NEGATIVE — SIGNIFICANT CHANGE UP
EGFR: 126 ML/MIN/1.73M2 — SIGNIFICANT CHANGE UP
EGFR: 126 ML/MIN/1.73M2 — SIGNIFICANT CHANGE UP
GLUCOSE SERPL-MCNC: 103 MG/DL — HIGH (ref 70–99)
GLUCOSE SERPL-MCNC: 103 MG/DL — HIGH (ref 70–99)
GLUCOSE UR QL: NEGATIVE MG/DL — SIGNIFICANT CHANGE UP
GLUCOSE UR QL: NEGATIVE MG/DL — SIGNIFICANT CHANGE UP
HCT VFR BLD CALC: 39.1 % — SIGNIFICANT CHANGE UP (ref 34.5–45)
HCT VFR BLD CALC: 39.1 % — SIGNIFICANT CHANGE UP (ref 34.5–45)
HGB BLD-MCNC: 12.9 G/DL — SIGNIFICANT CHANGE UP (ref 11.5–15.5)
HGB BLD-MCNC: 12.9 G/DL — SIGNIFICANT CHANGE UP (ref 11.5–15.5)
KETONES UR-MCNC: 40 MG/DL
KETONES UR-MCNC: 40 MG/DL
LEUKOCYTE ESTERASE UR-ACNC: ABNORMAL
LEUKOCYTE ESTERASE UR-ACNC: ABNORMAL
MAGNESIUM SERPL-MCNC: 2.1 MG/DL — SIGNIFICANT CHANGE UP (ref 1.6–2.6)
MAGNESIUM SERPL-MCNC: 2.1 MG/DL — SIGNIFICANT CHANGE UP (ref 1.6–2.6)
MCHC RBC-ENTMCNC: 29.6 PG — SIGNIFICANT CHANGE UP (ref 27–34)
MCHC RBC-ENTMCNC: 29.6 PG — SIGNIFICANT CHANGE UP (ref 27–34)
MCHC RBC-ENTMCNC: 33 GM/DL — SIGNIFICANT CHANGE UP (ref 32–36)
MCHC RBC-ENTMCNC: 33 GM/DL — SIGNIFICANT CHANGE UP (ref 32–36)
MCV RBC AUTO: 89.7 FL — SIGNIFICANT CHANGE UP (ref 80–100)
MCV RBC AUTO: 89.7 FL — SIGNIFICANT CHANGE UP (ref 80–100)
NITRITE UR-MCNC: POSITIVE
NITRITE UR-MCNC: POSITIVE
NRBC # BLD: 0 /100 WBCS — SIGNIFICANT CHANGE UP (ref 0–0)
NRBC # BLD: 0 /100 WBCS — SIGNIFICANT CHANGE UP (ref 0–0)
NRBC # FLD: 0 K/UL — SIGNIFICANT CHANGE UP (ref 0–0)
NRBC # FLD: 0 K/UL — SIGNIFICANT CHANGE UP (ref 0–0)
PH UR: 8.5 (ref 5–8)
PH UR: 8.5 (ref 5–8)
PHOSPHATE SERPL-MCNC: 3.4 MG/DL — SIGNIFICANT CHANGE UP (ref 2.5–4.5)
PHOSPHATE SERPL-MCNC: 3.4 MG/DL — SIGNIFICANT CHANGE UP (ref 2.5–4.5)
PLATELET # BLD AUTO: 165 K/UL — SIGNIFICANT CHANGE UP (ref 150–400)
PLATELET # BLD AUTO: 165 K/UL — SIGNIFICANT CHANGE UP (ref 150–400)
POTASSIUM SERPL-MCNC: 3.9 MMOL/L — SIGNIFICANT CHANGE UP (ref 3.5–5.3)
POTASSIUM SERPL-MCNC: 3.9 MMOL/L — SIGNIFICANT CHANGE UP (ref 3.5–5.3)
POTASSIUM SERPL-SCNC: 3.9 MMOL/L — SIGNIFICANT CHANGE UP (ref 3.5–5.3)
POTASSIUM SERPL-SCNC: 3.9 MMOL/L — SIGNIFICANT CHANGE UP (ref 3.5–5.3)
PROT UR-MCNC: SIGNIFICANT CHANGE UP MG/DL
PROT UR-MCNC: SIGNIFICANT CHANGE UP MG/DL
RBC # BLD: 4.36 M/UL — SIGNIFICANT CHANGE UP (ref 3.8–5.2)
RBC # BLD: 4.36 M/UL — SIGNIFICANT CHANGE UP (ref 3.8–5.2)
RBC # FLD: 14.2 % — SIGNIFICANT CHANGE UP (ref 10.3–14.5)
RBC # FLD: 14.2 % — SIGNIFICANT CHANGE UP (ref 10.3–14.5)
RBC CASTS # UR COMP ASSIST: 1 /HPF — SIGNIFICANT CHANGE UP (ref 0–4)
RBC CASTS # UR COMP ASSIST: 1 /HPF — SIGNIFICANT CHANGE UP (ref 0–4)
SODIUM SERPL-SCNC: 136 MMOL/L — SIGNIFICANT CHANGE UP (ref 135–145)
SODIUM SERPL-SCNC: 136 MMOL/L — SIGNIFICANT CHANGE UP (ref 135–145)
SP GR SPEC: 1.02 — SIGNIFICANT CHANGE UP (ref 1–1.03)
SP GR SPEC: 1.02 — SIGNIFICANT CHANGE UP (ref 1–1.03)
SQUAMOUS # UR AUTO: 2 /HPF — SIGNIFICANT CHANGE UP (ref 0–5)
SQUAMOUS # UR AUTO: 2 /HPF — SIGNIFICANT CHANGE UP (ref 0–5)
TSH SERPL-MCNC: 3.67 UIU/ML — SIGNIFICANT CHANGE UP (ref 0.27–4.2)
TSH SERPL-MCNC: 3.67 UIU/ML — SIGNIFICANT CHANGE UP (ref 0.27–4.2)
UROBILINOGEN FLD QL: 0.2 MG/DL — SIGNIFICANT CHANGE UP (ref 0.2–1)
UROBILINOGEN FLD QL: 0.2 MG/DL — SIGNIFICANT CHANGE UP (ref 0.2–1)
WBC # BLD: 8.44 K/UL — SIGNIFICANT CHANGE UP (ref 3.8–10.5)
WBC # BLD: 8.44 K/UL — SIGNIFICANT CHANGE UP (ref 3.8–10.5)
WBC # FLD AUTO: 8.44 K/UL — SIGNIFICANT CHANGE UP (ref 3.8–10.5)
WBC # FLD AUTO: 8.44 K/UL — SIGNIFICANT CHANGE UP (ref 3.8–10.5)
WBC UR QL: 52 /HPF — HIGH (ref 0–5)
WBC UR QL: 52 /HPF — HIGH (ref 0–5)

## 2023-11-10 PROCEDURE — 12345: CPT | Mod: NC

## 2023-11-10 PROCEDURE — 99223 1ST HOSP IP/OBS HIGH 75: CPT

## 2023-11-10 PROCEDURE — ZZZZZ: CPT

## 2023-11-10 PROCEDURE — 93010 ELECTROCARDIOGRAM REPORT: CPT

## 2023-11-10 PROCEDURE — 70450 CT HEAD/BRAIN W/O DYE: CPT | Mod: 26

## 2023-11-10 PROCEDURE — 90792 PSYCH DIAG EVAL W/MED SRVCS: CPT

## 2023-11-10 RX ORDER — MOMETASONE FUROATE 220 UG/1
2 INHALANT RESPIRATORY (INHALATION) DAILY
Refills: 0 | Status: DISCONTINUED | OUTPATIENT
Start: 2023-11-10 | End: 2023-11-14

## 2023-11-10 RX ORDER — ALBUTEROL 90 UG/1
2 AEROSOL, METERED ORAL EVERY 6 HOURS
Refills: 0 | Status: DISCONTINUED | OUTPATIENT
Start: 2023-11-10 | End: 2023-11-14

## 2023-11-10 RX ORDER — LEVOTHYROXINE SODIUM 125 MCG
175 TABLET ORAL DAILY
Refills: 0 | Status: DISCONTINUED | OUTPATIENT
Start: 2023-11-10 | End: 2023-11-14

## 2023-11-10 RX ORDER — ACETAMINOPHEN 500 MG
650 TABLET ORAL ONCE
Refills: 0 | Status: COMPLETED | OUTPATIENT
Start: 2023-11-10 | End: 2023-11-10

## 2023-11-10 RX ORDER — QUETIAPINE FUMARATE 200 MG/1
25 TABLET, FILM COATED ORAL EVERY 6 HOURS
Refills: 0 | Status: DISCONTINUED | OUTPATIENT
Start: 2023-11-10 | End: 2023-11-14

## 2023-11-10 RX ORDER — SODIUM CHLORIDE 9 MG/ML
1000 INJECTION INTRAMUSCULAR; INTRAVENOUS; SUBCUTANEOUS ONCE
Refills: 0 | Status: COMPLETED | OUTPATIENT
Start: 2023-11-10 | End: 2023-11-10

## 2023-11-10 RX ORDER — FOLIC ACID 0.8 MG
1 TABLET ORAL DAILY
Refills: 0 | Status: DISCONTINUED | OUTPATIENT
Start: 2023-11-10 | End: 2023-11-14

## 2023-11-10 RX ORDER — ONDANSETRON 8 MG/1
4 TABLET, FILM COATED ORAL ONCE
Refills: 0 | Status: COMPLETED | OUTPATIENT
Start: 2023-11-10 | End: 2023-11-10

## 2023-11-10 RX ORDER — ENOXAPARIN SODIUM 100 MG/ML
40 INJECTION SUBCUTANEOUS EVERY 24 HOURS
Refills: 0 | Status: DISCONTINUED | OUTPATIENT
Start: 2023-11-10 | End: 2023-11-14

## 2023-11-10 RX ORDER — ACETAMINOPHEN 500 MG
650 TABLET ORAL EVERY 6 HOURS
Refills: 0 | Status: DISCONTINUED | OUTPATIENT
Start: 2023-11-10 | End: 2023-11-14

## 2023-11-10 RX ORDER — ONDANSETRON 8 MG/1
4 TABLET, FILM COATED ORAL EVERY 8 HOURS
Refills: 0 | Status: DISCONTINUED | OUTPATIENT
Start: 2023-11-10 | End: 2023-11-14

## 2023-11-10 RX ORDER — LANOLIN ALCOHOL/MO/W.PET/CERES
3 CREAM (GRAM) TOPICAL AT BEDTIME
Refills: 0 | Status: DISCONTINUED | OUTPATIENT
Start: 2023-11-10 | End: 2023-11-14

## 2023-11-10 RX ADMIN — MOMETASONE FUROATE 2 PUFF(S): 220 INHALANT RESPIRATORY (INHALATION) at 12:57

## 2023-11-10 RX ADMIN — Medication 1 TABLET(S): at 14:46

## 2023-11-10 RX ADMIN — Medication 1 MILLIGRAM(S): at 12:36

## 2023-11-10 RX ADMIN — Medication 650 MILLIGRAM(S): at 00:34

## 2023-11-10 RX ADMIN — ONDANSETRON 4 MILLIGRAM(S): 8 TABLET, FILM COATED ORAL at 00:34

## 2023-11-10 RX ADMIN — ENOXAPARIN SODIUM 40 MILLIGRAM(S): 100 INJECTION SUBCUTANEOUS at 12:36

## 2023-11-10 RX ADMIN — SODIUM CHLORIDE 1000 MILLILITER(S): 9 INJECTION INTRAMUSCULAR; INTRAVENOUS; SUBCUTANEOUS at 00:41

## 2023-11-10 RX ADMIN — Medication 3 MILLIGRAM(S): at 21:26

## 2023-11-10 NOTE — BH CONSULTATION LIAISON ASSESSMENT NOTE - NSBHATTESTCOMMENTATTENDFT_PSY_A_CORE
met with the patient along with ms3, impression and plan discussed and agreed upon. I reviewed above noted, edited as needed.

## 2023-11-10 NOTE — BH CONSULTATION LIAISON ASSESSMENT NOTE - DETAILS
2 Previous overdose's (8/2023 - No hosptilization, and 11/2023) 2 Previous overdose's (8/2023 - No hospitalization, and 11/2023)

## 2023-11-10 NOTE — BH CONSULTATION LIAISON ASSESSMENT NOTE - MSE UNSTRUCTURED FT
Patient was lying in hospital bed while wearing hospital gown, hygiene was fair. No noticeable psychomotor abnormalities or tics. Patient spoke fluent [place] and  was used. Was cooperative and properly reactive to interviewer. Gait was unable to be assessed. Speech was normal rate and rhythm with low volume. Patient described her mood as being "down", affect was sad, with slightly restricted range, overall mood congruent. Thought process was linear and concrete. Thought content contained endorsment of AH (family's voices), possible paranoia (people are talking about me). Denied any current SI, HI, NSSIB. Cognition was intact. Insight was fair, and judgement was fair.

## 2023-11-10 NOTE — PATIENT PROFILE ADULT - FALL HARM RISK - HARM RISK INTERVENTIONS
Communicate Risk of Fall with Harm to all staff/Orthostatic vital signs/Reinforce activity limits and safety measures with patient and family/Tailored Fall Risk Interventions/Visual Cue: Yellow wristband and red socks/Bed in lowest position, wheels locked, appropriate side rails in place/Call bell, personal items and telephone in reach/Instruct patient to call for assistance before getting out of bed or chair/Non-slip footwear when patient is out of bed/[place] to call system/Physically safe environment - no spills, clutter or unnecessary equipment/Purposeful Proactive Rounding/Room/bathroom lighting operational, light cord in reach

## 2023-11-10 NOTE — BH CONSULTATION LIAISON ASSESSMENT NOTE - NSCOMMENTSUICRISKFACT_PSY_ALL_CORE
Patient has an extensive chronic medical history that places a lot of pain on herself. She also views herself as a burden to family because of it.

## 2023-11-10 NOTE — H&P ADULT - PROBLEM SELECTOR PLAN 4
-w/ chronic neuropathy however exacerbated by med overdose. No acute neuro deficits   -hold duloxetine and lyrica for now. Restart after out of observation period and if cleared by psych

## 2023-11-10 NOTE — H&P ADULT - PROBLEM SELECTOR PLAN 3
-no evidence of acute exacerbation   -on breztri and qvar inhaler. c/w mometasone   -on tezspire injection as outpt  -albuterol prn

## 2023-11-10 NOTE — BH CONSULTATION LIAISON ASSESSMENT NOTE - CURRENT MEDICATION
MEDICATIONS  (STANDING):  enoxaparin Injectable 40 milliGRAM(s) SubCutaneous every 24 hours  folic acid 1 milliGRAM(s) Oral daily  levothyroxine 175 MICROGram(s) Oral daily  mometasone 220 MICROgram(s) Inhaler 2 Puff(s) Inhalation daily    MEDICATIONS  (PRN):  acetaminophen     Tablet .. 650 milliGRAM(s) Oral every 6 hours PRN Temp greater or equal to 38C (100.4F), Mild Pain (1 - 3)  albuterol    90 MICROgram(s) HFA Inhaler 2 Puff(s) Inhalation every 6 hours PRN for shortness of breath and/or wheezing  aluminum hydroxide/magnesium hydroxide/simethicone Suspension 30 milliLiter(s) Oral every 4 hours PRN Dyspepsia  melatonin 3 milliGRAM(s) Oral at bedtime PRN Insomnia  ondansetron Injectable 4 milliGRAM(s) IV Push every 8 hours PRN Nausea and/or Vomiting

## 2023-11-10 NOTE — PATIENT PROFILE ADULT - DO YOU FEEL THREATENED BY OTHERS?
[facility] phone call message- medication clarification/question:    Full Medication Name: SUMAtriptan (IMITREX)   Dose: 5 mg/act nasal spray    Question: Patient called stating refills for medication is way to expensive costing $400 and would like to know if  is able to request alternative brand that is more affordable for her to use that will help with her.plesase call and advise if needed.       Pharmacy confirmed as [facility] #[id number] - [place], [place] - [address] AT [facility] & [facility]: Yes    OK to leave a message on voice mail? Yes    Primary language: English      needed? No    Call taken on July 22, 2021 at 12:53 PM by Alma Rosa Garcia       no

## 2023-11-10 NOTE — H&P ADULT - PROBLEM SELECTOR PLAN 1
-pt admits to taking 4 buproprion tabs, intentional overdose  - appreciate tox recs. c/w supportive care, observe for 8 hours from time of ingestion and monitor on tele. EKG without QRS or QTC prolongation  -monitor for signs of seizures, serotonin toxicity  -symptomatic care   -c/w 1:1  - consult in am -pt admits to taking 4 buproprion tabs, intentional overdose  - appreciate tox recs. c/w supportive care, observe for 8 hours from time of ingestion and monitor on tele. EKG without QRS or QTC prolongation  -monitor for signs of seizures, serotonin toxicity  -f/u am EKG  -symptomatic care   -c/w 1:1  - consult in am

## 2023-11-10 NOTE — BH CONSULTATION LIAISON ASSESSMENT NOTE - DIFFERENTIAL
Differential should include MDD, MDD with psychotic features, PDD, and Schizoaffective disorder. Patient currently meets criteria for MDD based on her mood symptoms as previously mentioned in the HPI. Based on timeline of events patient might also meet criteria for PDD (>2 years) without any periods lacking mood symptoms. Due to patient's possible psychotic symptoms (AH, delusions of paranoia), both MDD w/psychotic features and Schizoaffective disorder should be included. Further elucidation of timeline and patternt of mood symptoms and psychotic symptoms will distinguish between the 2 diagnosis. Most likely not Bipolar Disorder due to lack of any endorsement of manic symptoms.  Differential should include MDD, MDD with psychotic features, PDD, and Schizoaffective disorder. Patient currently meets criteria for MDD based on her mood symptoms as previously mentioned in the HPI. Based on timeline of events patient might also meet criteria for PDD (>2 years) without any periods lacking mood symptoms. Due to patient's possible psychotic symptoms (AH, delusions of paranoia), both MDD w/psychotic features and Schizoaffective disorder should be included. Further elucidation of timeline and pattern of mood symptoms and psychotic symptoms will distinguish between the 2 diagnosis. Most likely not Bipolar Disorder due to lack of any endorsement of manic symptoms.

## 2023-11-10 NOTE — BH CONSULTATION LIAISON ASSESSMENT NOTE - RISK ASSESSMENT
At this time, patient is at high risk of harm to self and requires inpatient psychiatric admission. Risk factors include hx of   current active SI  recent SA by overdose    Felling of emptiness and hopeless  Living with Chronic disease     Protective factors include  Family

## 2023-11-10 NOTE — H&P ADULT - ASSESSMENT
33 year old female with PMH significant for depression, fibromyalgia, thyroid cancer s/p resection, and asthma presenting to the ED after a suicide attempt.

## 2023-11-10 NOTE — H&P ADULT - NSHPPHYSICALEXAM_GEN_ALL_CORE
GENERAL APPEARANCE: Well developed, alert and cooperative. NAD.   HEENT:  PERRL, EOMI. External auditory canals normal, hearing grossly intact.  NECK: Neck supple, non-tender   CARDIAC: Normal S1 and S2. No S3, S4 or murmurs. Rhythm is regular.  LUNGS: Clear to auscultation and percussion without rales, rhonchi, wheezing or diminished breath sounds.  ABDOMEN: Positive bowel sounds. Soft, nondistended, epigastric tenderness. No guarding or rebound.   MUSCULOSKELETAL: ROM intact spine and extremities. No joint erythema or tenderness.   BACK: normal posture, no spinal deformity, symmetry of spinal muscles, without tenderness, decreased range of motion.  EXTREMITIES: No significant deformity or joint abnormality. No edema. Peripheral pulses intact.   NEUROLOGICAL: CN II-XII intact. Strength and sensation symmetric and intact throughout.   SKIN: Skin normal color, texture and turgor with no lesions or eruptions.  PSYCHIATRIC: AOx3. depressed affect, withdrawn

## 2023-11-10 NOTE — ED ADULT NURSE REASSESSMENT NOTE - NS ED NURSE REASSESS COMMENT FT1
Pt resting in stretcher, A&O x 4, offers no complaints of pain or discomfort at this time. RR equal and unlabored, VS stable, no acute distress noted. Continued 1:1, PCA at bedside. Safety measures maintained, family member at bedside, comfort provided, awaiting BH placement at this time.
Pt resting in stretcher, sleeping, A&O x 4, offers no complaints of pain ro discomfort at this time. RR equal and unlabored, VS stable, no acute distress noted at this time. 1:1 maintained, PCA at bedside. Safety maintained, comfort provided, awaiting BH placement at this time.
pt is sitting with  in triage, tearful. pt sitting in front of triage station.
Report received from day shift RN Shaquille. Pt resting in stretcher, A&O x 4, Yi speaking, offers complaints of weakness, depression, and endorses SI. Belongings collected and valuables with secureity, 1:1 started, PCA at bedside. RR equal and unlabored, VS stable, placed on cardiac monitor NSR noted. 20G IV placed in L AC, labs drawn and sent. Safety maintained, comfort provided, CO to be completed as ordered, awaiting ailyn eval at this time.

## 2023-11-10 NOTE — H&P ADULT - HISTORY OF PRESENT ILLNESS
33 year old female with PMH significant for depression, fibromyalgia, thyroid cancer s/p resection, and asthma presenting to the ED after a suicide attempt. Patient states that at 4pm today overdosed on buproprion in an SI attempt. Patient reports taking 4 tablets of bupropion 150mg. Patient denied ingesting any other substance including prescriptions and OTC medications. Patient states that she had a previous SI attempt  in august but did not go the ED for it. Patient states she has been feeling depressed for a while now and had increasing thoughts of SI. Has been unable to get outpt psychiatric care. She endorses auditory hallucinations, sometimes hearing her parents telling her something bad has happened. She reports she has frequent distressing thoughts about something happening to her family. Denies visual hallucinations. Denies SI/HI currently. At this time, patient states that she has nausea, vomiting, lower extremity numbness and a headache. Reports symptoms improving since presentation. Denies fevers, chills, visual changes, chest pain, shortness of breath, cough.

## 2023-11-10 NOTE — H&P ADULT - NSHPREVIEWOFSYSTEMS_GEN_ALL_CORE
CONSTITUTIONAL: +fatigue No weight loss, fever, chills  HEENT:  Eyes:  No visual loss, blurred vision, double vision or yellow sclerae. Ears, Nose, Throat:  No hearing loss, sneezing, congestion, runny nose or sore throat.  SKIN:  No rash or itching.  CARDIOVASCULAR:  No chest pain, chest pressure or chest discomfort. No palpitations or edema.  RESPIRATORY:  No shortness of breath, cough or sputum.  GASTROINTESTINAL:  +epigastric pain +N/V No diarrhea. No blood.  GENITOURINARY:  Denies hematuria, dysuria.   NEUROLOGICAL: +headache +dizziness +peripheral neuropathy No syncope, paralysis, ataxia. No change in bowel or bladder control.  MUSCULOSKELETAL:  +LE pain.  PSYCHIATRIC:  +depression + anxiety.  ENDOCRINOLOGIC:  No reports of sweating, cold or heat intolerance. No polyuria or polydipsia.

## 2023-11-10 NOTE — PROGRESS NOTE ADULT - PROBLEM SELECTOR PLAN 1
-pt admits to taking 4 buproprion tabs, intentional overdose  - appreciate tox recs. c/w supportive care, observe for 8 hours from time of ingestion and monitor on tele. EKG without QRS or QTC prolongation  -monitor for signs of seizures, serotonin toxicity  -reviewed am EKG 11/10, QTc 469ms  -symptomatic care   -c/w 1:1  -BH consulted, f/u recs -pt admits to taking 4 buproprion tabs, intentional overdose  - appreciate tox recs. c/w supportive care, observe for 8 hours from time of ingestion and monitor on tele. EKG without QRS or QTC prolongation  -monitor for signs of seizures, serotonin toxicity  -reviewed am EKG 11/10, QTc 469ms  -symptomatic care   -c/w 1:1  - consulted, start seroquel 25mg q6h prn, check b12/folate/tsh  tsh 3.6, euthyroid,   - likely needs inpt psych admission to [facility] -pt admits to taking 4 buproprion tabs, intentional overdose  - appreciate tox recs. c/w supportive care, observe for 8 hours from time of ingestion and monitor on tele. EKG without QRS or QTC prolongation  -monitor for signs of seizures, serotonin toxicity  -reviewed am EKG 11/10, QTc 469ms  -symptomatic care   -c/w 1:1  - consulted, start seroquel 25mg q6h prn, check b12/folate/tsh  tsh 3.6, euthyroid,   check CT head  - likely needs inpt psych admission to [facility]

## 2023-11-10 NOTE — BH CONSULTATION LIAISON ASSESSMENT NOTE - SUMMARY
33 year old female with PMH significant for depression, fibromyalgia, thyroid cancer s/p resection, and asthma presenting to the ED after a suicide attempt. Patient was found to have ingested 4x 150mg of her Wellbutrin medication. She then asked her  to bring her to the hospital.     Plan     #Depression   -Continuos observation  -Weekend f/u with patient   -Start Seroquel 25mg PRN PO   -Plan for possible admission to [facility]  33 year old female with PMH significant for depression, fibromyalgia, thyroid cancer s/p resection, and asthma presenting to the ED after a suicide attempt. Patient was found to have ingested 4x 150mg of her Wellbutrin medication. She then asked her  to bring her to the hospital.     Plan     #Depression   -Continuos observation  -Weekend f/u with patient   -Start Seroquel 25mg PRN PO   -Plan for possible admission to [facility]    #Psychotic Symptoms   -Head CT   -RPR test  -B12, Folate  -Check thyroid levels   33 year old female with PMH significant for depression, fibromyalgia, thyroid cancer s/p resection, and asthma presenting to the ED after a suicide attempt. Patient was found to have ingested 4x 150mg of her Wellbutrin medication. She then asked her  to bring her to the hospital.     Based assessment done patient has been experiencing worsening symptoms of depression, anxiety along with possible psychosis (this will need to be re-evaluated). Admits that overdose was a suicide attempt.     Plan     -Continue 1:1 co for safety  - Ensure that TSH and pregnancy has been checked.   - For now will not start a standing psychotropic  - ANXIETY, AGITATION=====Seroquel 25mg q 6hrs prn PO  - DISPOSITION== Inpatient psychiatric admission. 9.13 vs 2pc

## 2023-11-10 NOTE — BH CONSULTATION LIAISON ASSESSMENT NOTE - DESCRIPTION
Lives at home with  and spouse. Immigrated from [place]. Highest level of academic achievement was 4 years of college in [place] but did not finish degree.

## 2023-11-10 NOTE — PROGRESS NOTE ADULT - PROBLEM SELECTOR PLAN 7
pyuria on UA, check urine culture  relatively asymptomatic, doubt this is contributing to her SI  but will treat w/ bactrim for 3 days just to eradicate any possible albeit unlikely contributing factors

## 2023-11-10 NOTE — BH CONSULTATION LIAISON ASSESSMENT NOTE - NSBHCHARTREVIEWVS_PSY_A_CORE FT
Vital Signs Last 24 Hrs  T(C): 36.9 (10 Nov 2023 09:51), Max: 37 (09 Nov 2023 18:46)  T(F): 98.4 (10 Nov 2023 09:51), Max: 98.6 (09 Nov 2023 18:46)  HR: 66 (10 Nov 2023 09:51) (66 - 86)  BP: 109/69 (10 Nov 2023 09:51) (105/61 - 147/94)  BP(mean): --  RR: 15 (10 Nov 2023 09:51) (14 - 16)  SpO2: 99% (10 Nov 2023 09:51) (98% - 100%)    Parameters below as of 10 Nov 2023 09:51  Patient On (Oxygen Delivery Method): room air

## 2023-11-10 NOTE — H&P ADULT - NSHPLABSRESULTS_GEN_ALL_CORE
(11-09 @ 20:35)                      14.4  7.62 )-----------( 174                 43.1    Neutrophils = 4.49 (58.9%)  Lymphocytes = 2.06 (27.0%)  Eosinophils = 0.45 (5.9%)  Basophils = 0.05 (0.7%)  Monocytes = 0.55 (7.2%)  Bands = --%    11-09    138  |  103  |  21  ----------------------------<  84  3.7   |  25  |  0.62    Ca    9.1      09 Nov 2023 20:35    TPro  7.5  /  Alb  4.4  /  TBili  0.4  /  DBili  x   /  AST  18  /  ALT  17  /  AlkPhos  76  11-09          Tox:   (11-09 @ 20:35)  Acetaminophen Level, Serum: <10 [15 - 25]  Barbiturates Measurement: --  Benzodiazepines: --  Ethanol, Whole Blood: --  Salicylate Level, Serum: <0.3 [15.0 - 30.0]        Urinalysis Basic - ( 09 Nov 2023 20:35 )    Color: x / Appearance: x / SG: x / pH: x  Gluc: 84 mg/dL / Ketone: x  / Bili: x / Urobili: x   Blood: x / Protein: x / Nitrite: x   Leuk Esterase: x / RBC: x / WBC x   Sq Epi: x / Non Sq Epi: x / Bacteria: x    Labs personally reviewed  EKG: NSR no acute ischemic changes. qtc 470

## 2023-11-10 NOTE — PATIENT PROFILE ADULT - SURGICAL SITE INCISION
[facility] Note:  Renal Adjustment for aztreonam (AZACTAM)    Kylah Costello is a 82year old female who has been prescribed aztreonam (AZACTAM) 1 gm every 8 hrs. CrCl is estimated creatinine clearance is 23.5 mL/min (based on SCr of 1.21 mg/dL).  so no

## 2023-11-10 NOTE — BH CONSULTATION LIAISON ASSESSMENT NOTE - HPI (INCLUDE ILLNESS QUALITY, SEVERITY, DURATION, TIMING, CONTEXT, MODIFYING FACTORS, ASSOCIATED SIGNS AND SYMPTOMS)
33 year old female with PMH significant for depression, fibromyalgia, thyroid cancer s/p resection, and asthma presenting to the ED after a suicide attempt.     Patient reports that she had been feeling an increase feeling of anxiety and depression. She had been feeling "down" and did not want her son to see her in her current state and decided to take pills in an attempt to kill herself. She views herself as a burden for her family and wants them to have a better life without her. After she had taken the pills she got sleepy, vomited, and then told her spouse to bring her to the hospital. She did not say that she had planned the attempt, and it was in the moment that she decided to take the pills. Patient also said that she only took 4 pills because she wasn't sure if she had really wanted to die. Patient endorsed symptoms of depression(guilt, increased appetite, decreased sleep, depressed mood, suicidality, hopelessness) starting around 2 years ago when she started to have procedures for her Thyroid cancer. Her son has autism, and feels as if she has a lot of responsibilities to handle between the health of her son and herself. Denies any symptoms of juan. Denies HI, NSSIB, or current SI.  Endorsed feelings of passive SI in the past.  Possible AH of family members calling out her middle name, as well as delusions of paranoia (people talking about her). No drug or alcohol use.     Per Spouse:   Patient has been feeling depressed roughly 1 and a half years ago during which she was going through procedures for her thyroid cancer. Reports that the depression has gotten worse starting a few months ago, patient would frequently cry and have panic attacks (can't breath, heart racing). Is still able to enjoy moments playing with her son, but primarily has been feeling down. She has often spoke about not wanting to live anymore to the spouse, but did not mention any specific plan. She has not been sleeping. Spouse reported that she was once in an outpatient therapy program, but was unable to provide any information.

## 2023-11-11 DIAGNOSIS — N39.0 URINARY TRACT INFECTION, SITE NOT SPECIFIED: ICD-10-CM

## 2023-11-11 LAB
ANION GAP SERPL CALC-SCNC: 10 MMOL/L — SIGNIFICANT CHANGE UP (ref 7–14)
ANION GAP SERPL CALC-SCNC: 10 MMOL/L — SIGNIFICANT CHANGE UP (ref 7–14)
BUN SERPL-MCNC: 13 MG/DL — SIGNIFICANT CHANGE UP (ref 7–23)
BUN SERPL-MCNC: 13 MG/DL — SIGNIFICANT CHANGE UP (ref 7–23)
CALCIUM SERPL-MCNC: 8.6 MG/DL — SIGNIFICANT CHANGE UP (ref 8.4–10.5)
CALCIUM SERPL-MCNC: 8.6 MG/DL — SIGNIFICANT CHANGE UP (ref 8.4–10.5)
CHLORIDE SERPL-SCNC: 104 MMOL/L — SIGNIFICANT CHANGE UP (ref 98–107)
CHLORIDE SERPL-SCNC: 104 MMOL/L — SIGNIFICANT CHANGE UP (ref 98–107)
CO2 SERPL-SCNC: 23 MMOL/L — SIGNIFICANT CHANGE UP (ref 22–31)
CO2 SERPL-SCNC: 23 MMOL/L — SIGNIFICANT CHANGE UP (ref 22–31)
CREAT SERPL-MCNC: 0.6 MG/DL — SIGNIFICANT CHANGE UP (ref 0.5–1.3)
CREAT SERPL-MCNC: 0.6 MG/DL — SIGNIFICANT CHANGE UP (ref 0.5–1.3)
EGFR: 123 ML/MIN/1.73M2 — SIGNIFICANT CHANGE UP
EGFR: 123 ML/MIN/1.73M2 — SIGNIFICANT CHANGE UP
FOLATE SERPL-MCNC: 9.5 NG/ML — SIGNIFICANT CHANGE UP (ref 3.1–17.5)
FOLATE SERPL-MCNC: 9.5 NG/ML — SIGNIFICANT CHANGE UP (ref 3.1–17.5)
GLUCOSE SERPL-MCNC: 85 MG/DL — SIGNIFICANT CHANGE UP (ref 70–99)
GLUCOSE SERPL-MCNC: 85 MG/DL — SIGNIFICANT CHANGE UP (ref 70–99)
HCT VFR BLD CALC: 40.1 % — SIGNIFICANT CHANGE UP (ref 34.5–45)
HCT VFR BLD CALC: 40.1 % — SIGNIFICANT CHANGE UP (ref 34.5–45)
HGB BLD-MCNC: 13.1 G/DL — SIGNIFICANT CHANGE UP (ref 11.5–15.5)
HGB BLD-MCNC: 13.1 G/DL — SIGNIFICANT CHANGE UP (ref 11.5–15.5)
MCHC RBC-ENTMCNC: 29.2 PG — SIGNIFICANT CHANGE UP (ref 27–34)
MCHC RBC-ENTMCNC: 29.2 PG — SIGNIFICANT CHANGE UP (ref 27–34)
MCHC RBC-ENTMCNC: 32.7 GM/DL — SIGNIFICANT CHANGE UP (ref 32–36)
MCHC RBC-ENTMCNC: 32.7 GM/DL — SIGNIFICANT CHANGE UP (ref 32–36)
MCV RBC AUTO: 89.3 FL — SIGNIFICANT CHANGE UP (ref 80–100)
MCV RBC AUTO: 89.3 FL — SIGNIFICANT CHANGE UP (ref 80–100)
NRBC # BLD: 0 /100 WBCS — SIGNIFICANT CHANGE UP (ref 0–0)
NRBC # BLD: 0 /100 WBCS — SIGNIFICANT CHANGE UP (ref 0–0)
NRBC # FLD: 0 K/UL — SIGNIFICANT CHANGE UP (ref 0–0)
NRBC # FLD: 0 K/UL — SIGNIFICANT CHANGE UP (ref 0–0)
PLATELET # BLD AUTO: 161 K/UL — SIGNIFICANT CHANGE UP (ref 150–400)
PLATELET # BLD AUTO: 161 K/UL — SIGNIFICANT CHANGE UP (ref 150–400)
POTASSIUM SERPL-MCNC: 3.7 MMOL/L — SIGNIFICANT CHANGE UP (ref 3.5–5.3)
POTASSIUM SERPL-MCNC: 3.7 MMOL/L — SIGNIFICANT CHANGE UP (ref 3.5–5.3)
POTASSIUM SERPL-SCNC: 3.7 MMOL/L — SIGNIFICANT CHANGE UP (ref 3.5–5.3)
POTASSIUM SERPL-SCNC: 3.7 MMOL/L — SIGNIFICANT CHANGE UP (ref 3.5–5.3)
RBC # BLD: 4.49 M/UL — SIGNIFICANT CHANGE UP (ref 3.8–5.2)
RBC # BLD: 4.49 M/UL — SIGNIFICANT CHANGE UP (ref 3.8–5.2)
RBC # FLD: 14.2 % — SIGNIFICANT CHANGE UP (ref 10.3–14.5)
RBC # FLD: 14.2 % — SIGNIFICANT CHANGE UP (ref 10.3–14.5)
SODIUM SERPL-SCNC: 137 MMOL/L — SIGNIFICANT CHANGE UP (ref 135–145)
SODIUM SERPL-SCNC: 137 MMOL/L — SIGNIFICANT CHANGE UP (ref 135–145)
T PALLIDUM AB TITR SER: NEGATIVE — SIGNIFICANT CHANGE UP
T PALLIDUM AB TITR SER: NEGATIVE — SIGNIFICANT CHANGE UP
TSH SERPL-MCNC: 3.32 UIU/ML — SIGNIFICANT CHANGE UP (ref 0.27–4.2)
TSH SERPL-MCNC: 3.32 UIU/ML — SIGNIFICANT CHANGE UP (ref 0.27–4.2)
VIT B12 SERPL-MCNC: 546 PG/ML — SIGNIFICANT CHANGE UP (ref 200–900)
VIT B12 SERPL-MCNC: 546 PG/ML — SIGNIFICANT CHANGE UP (ref 200–900)
WBC # BLD: 7.18 K/UL — SIGNIFICANT CHANGE UP (ref 3.8–10.5)
WBC # BLD: 7.18 K/UL — SIGNIFICANT CHANGE UP (ref 3.8–10.5)
WBC # FLD AUTO: 7.18 K/UL — SIGNIFICANT CHANGE UP (ref 3.8–10.5)
WBC # FLD AUTO: 7.18 K/UL — SIGNIFICANT CHANGE UP (ref 3.8–10.5)

## 2023-11-11 PROCEDURE — 99232 SBSQ HOSP IP/OBS MODERATE 35: CPT

## 2023-11-11 RX ORDER — DOXAZOSIN MESYLATE 4 MG
1 TABLET ORAL AT BEDTIME
Refills: 0 | Status: DISCONTINUED | OUTPATIENT
Start: 2023-11-11 | End: 2023-11-12

## 2023-11-11 RX ORDER — OXYCODONE HYDROCHLORIDE 5 MG/1
5 TABLET ORAL ONCE
Refills: 0 | Status: DISCONTINUED | OUTPATIENT
Start: 2023-11-11 | End: 2023-11-11

## 2023-11-11 RX ORDER — DULOXETINE HYDROCHLORIDE 30 MG/1
20 CAPSULE, DELAYED RELEASE ORAL DAILY
Refills: 0 | Status: DISCONTINUED | OUTPATIENT
Start: 2023-11-11 | End: 2023-11-11

## 2023-11-11 RX ORDER — LIDOCAINE 4 G/100G
1 CREAM TOPICAL EVERY 24 HOURS
Refills: 0 | Status: DISCONTINUED | OUTPATIENT
Start: 2023-11-11 | End: 2023-11-14

## 2023-11-11 RX ORDER — QUETIAPINE FUMARATE 200 MG/1
25 TABLET, FILM COATED ORAL ONCE
Refills: 0 | Status: COMPLETED | OUTPATIENT
Start: 2023-11-11 | End: 2023-11-11

## 2023-11-11 RX ADMIN — QUETIAPINE FUMARATE 25 MILLIGRAM(S): 200 TABLET, FILM COATED ORAL at 18:51

## 2023-11-11 RX ADMIN — OXYCODONE HYDROCHLORIDE 5 MILLIGRAM(S): 5 TABLET ORAL at 01:08

## 2023-11-11 RX ADMIN — LIDOCAINE 1 PATCH: 4 CREAM TOPICAL at 01:16

## 2023-11-11 RX ADMIN — ENOXAPARIN SODIUM 40 MILLIGRAM(S): 100 INJECTION SUBCUTANEOUS at 12:09

## 2023-11-11 RX ADMIN — Medication 1 MILLIGRAM(S): at 12:08

## 2023-11-11 RX ADMIN — QUETIAPINE FUMARATE 25 MILLIGRAM(S): 200 TABLET, FILM COATED ORAL at 22:43

## 2023-11-11 RX ADMIN — Medication 1 TABLET(S): at 05:42

## 2023-11-11 RX ADMIN — Medication 175 MICROGRAM(S): at 10:23

## 2023-11-11 RX ADMIN — OXYCODONE HYDROCHLORIDE 5 MILLIGRAM(S): 5 TABLET ORAL at 02:00

## 2023-11-11 RX ADMIN — Medication 150 MILLIGRAM(S): at 01:07

## 2023-11-11 RX ADMIN — Medication 650 MILLIGRAM(S): at 17:11

## 2023-11-11 RX ADMIN — Medication 1 TABLET(S): at 17:11

## 2023-11-11 RX ADMIN — Medication 3 MILLIGRAM(S): at 22:43

## 2023-11-11 NOTE — BH CONSULTATION LIAISON PROGRESS NOTE - NSBHASSESSMENTFT_PSY_ALL_CORE
The patient is a 33 year-old female with PPHx significant for depression, PPHx of fibromyalgia, thyroid cancer s/p resection, and asthma presenting to the ED after a suicide attempt.     As of 11/11/23: Patient with slight improvement in mood. No acute safety concerns noted today. 1:1 CO still present. Further evaluated the patient regarding potential psychosis sx. Given evaluation today, sx are more likely indicative of PTSD sx rather than psychosis. Will hold off on adding an antidepressant today, but will offer Prazosin for nightmare sx. Will re-evaluate tomorrow.    Plan:  1. Continue tx at [facility].  2. 1:1 CO for safety s/p SA.  3. Will start prazosin 1mg qHS for nightmare sx related to trauma.  4. PRNs: PO Seroquel 25mg q6 for severe agitation. The patient is a 33 year-old female with PPHx significant for depression, PPHx of fibromyalgia, thyroid cancer s/p resection, and asthma presenting to the ED after a suicide attempt.     As of 11/11/23: Patient with slight improvement in mood. No acute safety concerns noted today. 1:1 CO still present. Further evaluated the patient regarding potential psychosis sx. Given evaluation today, sx are more likely indicative of PTSD sx rather than psychosis. Will hold off on adding an antidepressant today, but will offer Prazosin for nightmare sx. Will re-evaluate tomorrow.    Plan:  1. Continue tx at [facility].  2. 1:1 CO for safety s/p SA.  3. Will start prazosin 1mg qHS for nightmare sx related to trauma. Hold off on other serotonergic agents for now as the plan now is to introduce a new antidepressant to the regimen. Can stop patient's Cymbalta and Wellbutrin for now.  4. PRNs: PO Seroquel 25mg q6 for severe agitation.

## 2023-11-11 NOTE — BH CONSULTATION LIAISON PROGRESS NOTE - MSE UNSTRUCTURED FT
Appearance: Patient appears stated age and biological sex. Appropriately dressed and groomed.  Behavior: Patient was cooperative, in good behavioral control.  Speech: Normal in rate, tone, production, volume.  Motor: No gross motor abnormalities noted including clinically significant PMR/PMA, tremors, tics. Gait/station WNL.  Mood: Patient endorsed mood to be "better today."  Affect: Overall still depressed, though able to brighten momentarily and appropriately during conversation, stable, slightly decreased expressivity, congruent to mood.  Thought Process: Linear, logical  Thought Content: Appropriate given context, no preoccupations or obsessions. Denies SI/HI. No delusional content noted today.  Perceptions: Denies A/V hallucinations.  Attention: Fair in the context of the Psychiatric interview.  Cognition: Grossly intact.   Memory: Grossly intact.   Insight: Limited to fair  Judgement: Limited to fair  Impulsivity: Fair in the context of being on the floors now.

## 2023-11-11 NOTE — PROGRESS NOTE ADULT - PROBLEM SELECTOR PLAN 1
-pt admits to taking 4 buproprion tabs, intentional overdose  - appreciate tox recs. c/w supportive care, observe for 8 hours from time of ingestion and monitor on tele. EKG without QRS or QTC prolongation  -monitor for signs of seizures, serotonin toxicity  -reviewed am EKG 11/10, QTc 469ms  -symptomatic care   -c/w 1:1  - consulted, cw seroquel 25mg q6h prn, start prazosin 1mg hs for nightmares  - Hold wellbutrin and cymbalta per   tsh 3.6, b12 546, folate 9.5   CT head no acute path  -needs inpt psych admission to [facility] next week
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
## 2023-11-12 PROCEDURE — 99232 SBSQ HOSP IP/OBS MODERATE 35: CPT

## 2023-11-12 RX ORDER — PRAZOSIN HCL 2 MG
1 CAPSULE ORAL AT BEDTIME
Refills: 0 | Status: DISCONTINUED | OUTPATIENT
Start: 2023-11-12 | End: 2023-11-14

## 2023-11-12 RX ORDER — HYDROXYZINE HCL 10 MG
25 TABLET ORAL EVERY 6 HOURS
Refills: 0 | Status: DISCONTINUED | OUTPATIENT
Start: 2023-11-12 | End: 2023-11-14

## 2023-11-12 RX ADMIN — Medication 1 TABLET(S): at 05:39

## 2023-11-12 RX ADMIN — Medication 1 MILLIGRAM(S): at 22:34

## 2023-11-12 RX ADMIN — QUETIAPINE FUMARATE 25 MILLIGRAM(S): 200 TABLET, FILM COATED ORAL at 18:41

## 2023-11-12 RX ADMIN — Medication 1 TABLET(S): at 18:42

## 2023-11-12 RX ADMIN — Medication 175 MICROGRAM(S): at 05:39

## 2023-11-12 RX ADMIN — LIDOCAINE 1 PATCH: 4 CREAM TOPICAL at 07:00

## 2023-11-12 RX ADMIN — Medication 650 MILLIGRAM(S): at 14:44

## 2023-11-12 RX ADMIN — ENOXAPARIN SODIUM 40 MILLIGRAM(S): 100 INJECTION SUBCUTANEOUS at 12:50

## 2023-11-12 RX ADMIN — Medication 650 MILLIGRAM(S): at 15:44

## 2023-11-12 RX ADMIN — LIDOCAINE 1 PATCH: 4 CREAM TOPICAL at 00:22

## 2023-11-12 RX ADMIN — Medication 1 MILLIGRAM(S): at 12:50

## 2023-11-12 RX ADMIN — LIDOCAINE 1 PATCH: 4 CREAM TOPICAL at 13:11

## 2023-11-12 RX ADMIN — Medication 1 MILLIGRAM(S): at 00:22

## 2023-11-12 NOTE — DISCHARGE NOTE PROVIDER - NSDCCPCAREPLAN_GEN_ALL_CORE_FT
PRINCIPAL DISCHARGE DIAGNOSIS  Diagnosis: Suicide attempt  Assessment and Plan of Treatment: Please continue your care at [facility]. Please take your medications as prescribed.

## 2023-11-12 NOTE — BH CONSULTATION LIAISON PROGRESS NOTE - ATTENDING COMMENTS
Discussed case with resident md on 11/11/2023, impression and plan discussed and agreed upon
Discussed case with resident md, impression and plan discussed and agreed upon

## 2023-11-12 NOTE — PROGRESS NOTE ADULT - PROBLEM SELECTOR PLAN 8
UA +, f/u UCx, relatively asymptomatic  Bactrim for 3 days, pt is allergic to pcn
UA +, f/u UCx, relatively asymptomatic  Bactrim for 3 days, pt is allergic to pcn

## 2023-11-12 NOTE — DISCHARGE NOTE PROVIDER - NSFOLLOWUPCLINICS_GEN_ALL_ED_FT
[facility]  Behavioral Health  [id number] [place] [address]  Phone: [phone]  Fax:   Follow Up Time: Routine

## 2023-11-12 NOTE — DISCHARGE NOTE PROVIDER - NSDCMRMEDTOKEN_GEN_ALL_CORE_FT
Albuterol (Eqv-Proventil HFA) 90 mcg/inh inhalation aerosol: 2 puff(s) inhaled every 6 hours as needed for  shortness of breath and/or wheezing  Breztri Aerosphere 160 mcg-9 mcg-4.8 mcg/inh inhalation aerosol: 2 puff(s) inhaled 2 times a day  buPROPion 150 mg/24 hours (XL) oral tablet, extended release: 1 tab(s) orally once a day  calcium carbonate 650 mg oral tablet: 1 tab(s) orally once a day  Citrucel 500 mg oral tablet: 3 tab(s) orally once a day  DULoxetine 20 mg oral delayed release capsule: 1 cap(s) orally once a day  ferrous sulfate 325 mg (65 mg elemental iron) oral tablet: 1 tab(s) orally 2 times a day  folic acid 1 mg oral tablet: 1 tab(s) orally once a day  hydrOXYzine hydrochloride 25 mg oral tablet: 1 tab(s) orally once a day  naltrexone 50 mg oral tablet: 1 tab(s) orally once a day  oxyBUTYnin 5 mg oral tablet: 1 tab(s) orally once a day  pregabalin 150 mg oral capsule: 1 cap(s) orally 2 times a day  Qvar 80 mcg/inh inhalation aerosol: 1 puff(s) inhaled 2 times a day  Synthroid 175 mcg (0.175 mg) oral tablet: 1 tab(s) orally once a day  Tezspire Pre-filled Pen 210 mg/1.91 mL subcutaneous solution: 210 milligram(s) subcutaneously once a month   Albuterol (Eqv-Proventil HFA) 90 mcg/inh inhalation aerosol: 2 puff(s) inhaled every 6 hours as needed for  shortness of breath and/or wheezing  Breztri Aerosphere 160 mcg-9 mcg-4.8 mcg/inh inhalation aerosol: 2 puff(s) inhaled 2 times a day  Citrucel 500 mg oral tablet: 3 tab(s) orally once a day  ferrous sulfate 325 mg (65 mg elemental iron) oral tablet: 1 tab(s) orally 2 times a day  folic acid 1 mg oral tablet: 1 tab(s) orally once a day  hydrOXYzine hydrochloride 25 mg oral tablet: 1 tab(s) orally every 6 hours As needed Anxiety  levothyroxine 175 mcg (0.175 mg) oral tablet: 1 tab(s) orally once a day  melatonin 3 mg oral tablet: 1 tab(s) orally once a day (at bedtime) As needed Insomnia  prazosin 1 mg oral capsule: 1 cap(s) orally once a day (at bedtime)  QUEtiapine 25 mg oral tablet: 1 tab(s) orally every 6 hours As needed agitation  Qvar 80 mcg/inh inhalation aerosol: 1 puff(s) inhaled 2 times a day  Tezspire Pre-filled Pen 210 mg/1.91 mL subcutaneous solution: 210 milligram(s) subcutaneously once a month

## 2023-11-12 NOTE — BH CONSULTATION LIAISON PROGRESS NOTE - NSBHDSMAXES_PSY_ALL_CORE
Telephone Encounter by Desirae Castellanos at 02/19/18 04:03 PM     Author:  Desirae Castellanos Service:  (none) Author Type:  Patient      Filed:  02/19/18 04:07 PM Encounter Date:  2/19/2018 Status:  Signed     :  Desirae Castellanos (Patient )              JACLYN ESTRADA    Patient Age: 40 year old    ACCT STATUS:   MESSAGE:[JA1.1T]    drug change needed patient's DULERA  Not covered by insurance preferred alternative is ADVAIRDISKUAE, BREOELLIPTAINH, ADVAIRHFAAER please call pharmacy to change medication.[JA1.1M] Message confirmed with caller.   Next and Last Visit with Provider and Department  Next visit with THIAGO CHACON is on No match found  Next visit with [facility] is on No match found  Last visit with THIAGO CHACON was on 09/06/2016 at 10:00 AM in [facility]  Last visit with [facility] was on 09/06/2016 at 10:00 AM in [facility]     WEIGHT AND HEIGHT: As of 05/30/2017 weight is 105 lbs.(47.628 kg). Height is 5' 2\"(1.575 m).   BMI is 19.20 kg/(m^2) calculated from:     Height 5' 2\" (1.575 m) as of 5/30/17     Weight 105 lb (47.628 kg) as of 5/30/17      No Known Allergies  Current outpatient prescriptions       Medication  Sig Dispense Refill   • SYNTHROID 100 MCG tablet Take 1 Tab by mouth daily. 30 Tab 1   • Mometasone Furo-Formoterol Fum (DULERA) 200-5 MCG/ACT AERO 2 puffs twice daily. 13 g 1   • Fluticasone Furoate (ARNUITY ELLIPTA) 200 MCG/ACT AEPB Inhale 1 Puff by mouth daily. 30 Each 0   • fexofenadine (ALLEGRA ALLERGY) 180 MG tablet Take 1 Tab by mouth daily. 30 Tab 2   • fluticasone (FLONASE) 50 MCG/ACT nasal spray Use 1 Spray in each nostril daily. 16 g 12      PHARMACY to use:           Pharmacy preference(s) on file: [facility] [address]    CALL BACK INFO:[JA1.1T] Ok to leave response (including medical information) on answering machine[JA1.1M]  ROUTING:[JA1.1T] Patient's physician/staff[JA1.1M]        PCP: 
Noemi Bartlett MD         INS: Payor: [employer] PPO / Plan: *No Plan* / Product Type: *No Product type* / Note: This is the primary coverage, but no account was found for this location or the patient's primary location.   ADDRESS:  [address] [place] [address] [place] [address][JA1.1T]       Revision History        User Key Date/Time User Provider Type Action    > JA1.1 02/19/18 04:07 PM Desirae Castellanos Patient  Sign    M - Manual, T - Template            
See above
See above

## 2023-11-12 NOTE — BH CONSULTATION LIAISON PROGRESS NOTE - NSBHASSESSMENTFT_PSY_ALL_CORE
The patient is a 33 year-old female with PPHx significant for depression, PPHx of fibromyalgia, thyroid cancer s/p resection, and asthma presenting to the ED after a suicide attempt.     As of 11/12/23: Patient with slight improvement in mood. No acute safety concerns noted today. 1:1 CO still present. Evaluation continues to be more suggestive of PTSD vs psychosis given pt's reported h/o multiple traumatic events, recurrent trauma-related nightmares, and trauma-related intrusive thoughts; continues to deny perceptual disturbances and no evidence of paranoia or other delusional thought content. Given ongoing nightmares and resultant poor sleep contributing to mood and pain syndrome, will increase doxazosin, tf Will hold off on adding an antidepressant today. Will re-evaluate tomorrow.    Plan:  1. Continue tx at [facility].  2. 1:1 CO for safety s/p SA.  3. Will start prazosin 1mg qHS for nightmare sx related to trauma. Hold off on other serotonergic agents for now as the plan now is to introduce a new antidepressant to the regimen. Can stop patient's Cymbalta and Wellbutrin for now.  4. PRNs: PO Seroquel 25mg q6 for severe agitation. The patient is a 33 year-old female with PPHx significant for depression, PPHx of fibromyalgia, thyroid cancer s/p resection, and asthma presenting to the ED after a suicide attempt.     As of 11/12/23: Patient with slight improvement in mood. No acute safety concerns noted today. 1:1 CO still present. Evaluation continues to be more suggestive of PTSD vs psychosis given pt's reported h/o multiple traumatic events, recurrent trauma-related nightmares, and trauma-related intrusive thoughts; continues to deny perceptual disturbances and no evidence of paranoia or other delusional thought content. Given ongoing nightmares and resultant poor sleep contributing to mood and pain syndrome, continue prazosin and plan to titrate tomorrow if tolerating. Plan to initiate antidepressant, pt states minimal pain benefit from Cymbalta - confirm pt's dose and if pt has trialed an effective dose consider switch to SSRI for improved mgmt of PTSD related sx.  Of note, pt reports taking low dose naltrexone at home for fibromyalgia-related pain and is interested in resuming this medication inpatient if it is an option.    Plan:  1. Continue tx at [facility].  2. 1:1 CO for safety s/p SA.  3. Continue prazosin 1mg qHS for nightmare sx related to trauma. Hold off on other serotonergic agents for now as the plan now is to introduce a new antidepressant to the regimen. Can continue to hold patient's Cymbalta and Wellbutrin for now.  4. PRNs: PO hydroxyzine HCl 25mg PO q6h PRN for mild to moderate anxiety. PO Seroquel 25mg q6 for severe agitation.

## 2023-11-12 NOTE — BH CONSULTATION LIAISON PROGRESS NOTE - NSBHFUPINTERVALHXFT_PSY_A_CORE
Interview conducted in patient's preferred language Egyptian  with interpretation via [employer]  [employer] [id number].     Seen for f/u suicidality. No acute events overnight. Started doxazosin 1mg POqhs  yd for PTSD-related nightmare.     On assessment td, pt is found sleeping, rouses to voice, calm and cooperative and appropriately participatory in interview. Benita states that she is feeling "ok" today but endorses ongoing depressed mood and passive suidicality, endorsing a wish to die but denying any plan or intent to act on these thoughts. She reports poor sleep, waking multiple times throughout the night, not restful, and continuing to have nightmares, states she is napping  in the day. Also endorsing ongoing fibromyalgia related pain which she  feels is not being treated appropriately here in the hospital and is contributing to her low mood and poor sleep. Appetite wnl. Requesting to shower. Denies AVH. No evidence of delusional thought content or paranoia.  Interview conducted in patient's preferred language Dutch  with interpretation via [employer]  [employer] [id number].     Seen for f/u suicidality. No acute events overnight. Started doxazosin 1mg POqhs  yd for PTSD-related nightmare.     On assessment td, pt is found sleeping, rouses to voice, calm and cooperative and appropriately participatory in interview. Benita states that she is feeling "ok" today but endorses ongoing depressed mood and passive suicidality, endorsing a wish to die but denying any plan or intent to act on these thoughts. She reports poor sleep, waking multiple times throughout the night, not restful, and continuing to have nightmares, states she is napping  in the day. Also endorsing ongoing fibromyalgia related pain which she  feels is not being treated appropriately here in the hospital and is contributing to her low mood and poor sleep. Appetite wnl. Requesting to shower. Denies AVH. No evidence of delusional thought content or paranoia.

## 2023-11-12 NOTE — PROGRESS NOTE ADULT - PROBLEM SELECTOR PLAN 1
Hospitalist -pt admits to taking 4 buproprion tabs, intentional overdose  - appreciate tox recs. c/w supportive care, observe for 8 hours from time of ingestion and monitor on tele. EKG without QRS or QTC prolongation  -monitor for signs of seizures, serotonin toxicity  -reviewed am EKG 11/10, QTc 469ms  -symptomatic care   -c/w 1:1  - consulted, cw seroquel 25mg q6h prn, start prazosin 1mg hs for nightmares  - Hold wellbutrin and cymbalta per   tsh 3.6, b12 546, folate 9.5   CT head no acute path  -needs inpt psych admission to [facility] next week

## 2023-11-12 NOTE — DISCHARGE NOTE PROVIDER - CARE PROVIDER_API CALL
Parish Ramsey  Internal Medicine  [address] [place] [address]  Phone: [phone]  Fax: [phone]  Follow Up Time:

## 2023-11-12 NOTE — DISCHARGE NOTE PROVIDER - HOSPITAL COURSE
33 year old female with PMH significant for depression, fibromyalgia, thyroid cancer s/p resection, and asthma presenting to the ED after a suicide attempt.       Problem/Plan - 1:  ·  Problem: Suicide attempt.   ·  Plan: -pt admits to taking 4 buproprion tabs, intentional overdose  - appreciate tox recs. c/w supportive care, observe for 8 hours from time of ingestion and monitor on tele. EKG without QRS or QTC prolongation  -monitor for signs of seizures, serotonin toxicity  -reviewed am EKG 11/10, QTc 469ms  -symptomatic care   -c/w 1:1  - consulted, cw seroquel 25mg q6h prn, start prazosin 1mg hs for nightmares  - Hold wellbutrin and cymbalta per   tsh 3.6, b12 546, folate 9.5   CT head no acute path  -inpt psych admission to [facility] this week.     Problem/Plan - 2:  ·  Problem: Anxiety and depression.   ·  Plan: -hold buproprion in the setting of SI  -f/u BH as above.     Problem/Plan - 3:  ·  Problem: Asthma.   ·  Plan: -no evidence of acute exacerbation   -on breztri and qvar inhaler. c/w mometasone   -on tezspire injection as outpt  -albuterol prn.     Problem/Plan - 4:  ·  Problem: Fibromyalgia.   ·  Plan: -w/ chronic neuropathy however exacerbated by med overdose. No acute neuro deficits   -hold duloxetine and lyrica for now as psych meds are being adjusted.     Problem/Plan - 5:  ·  Problem: Hypothyroidism.   ·  Plan: -h/o thyroid ca s/p resection c/b hypothyroidism. c/w synthroid  -euthyroid , tsh 3.3.   33F with hx of depression, fibromyalgia, thyroid cancer s/p resection (on synthroid), and asthma presenting to the ED after a suicide attempt. Patient was seen by toxicology - monitored, no cardiac abnormalities notes. Psychiatry saw patient and medications were adjusted. Stable for discharge to [facility].

## 2023-11-13 DIAGNOSIS — F43.10 POST-TRAUMATIC STRESS DISORDER, UNSPECIFIED: ICD-10-CM

## 2023-11-13 LAB
-  AMOXICILLIN/CLAVULANIC ACID: SIGNIFICANT CHANGE UP
-  AMOXICILLIN/CLAVULANIC ACID: SIGNIFICANT CHANGE UP
-  AMPICILLIN/SULBACTAM: SIGNIFICANT CHANGE UP
-  AMPICILLIN/SULBACTAM: SIGNIFICANT CHANGE UP
-  AMPICILLIN: SIGNIFICANT CHANGE UP
-  AMPICILLIN: SIGNIFICANT CHANGE UP
-  AZTREONAM: SIGNIFICANT CHANGE UP
-  AZTREONAM: SIGNIFICANT CHANGE UP
-  CEFAZOLIN: SIGNIFICANT CHANGE UP
-  CEFAZOLIN: SIGNIFICANT CHANGE UP
-  CEFEPIME: SIGNIFICANT CHANGE UP
-  CEFEPIME: SIGNIFICANT CHANGE UP
-  CEFOXITIN: SIGNIFICANT CHANGE UP
-  CEFOXITIN: SIGNIFICANT CHANGE UP
-  CEFTRIAXONE: SIGNIFICANT CHANGE UP
-  CEFTRIAXONE: SIGNIFICANT CHANGE UP
-  CEFUROXIME: SIGNIFICANT CHANGE UP
-  CEFUROXIME: SIGNIFICANT CHANGE UP
-  CIPROFLOXACIN: SIGNIFICANT CHANGE UP
-  CIPROFLOXACIN: SIGNIFICANT CHANGE UP
-  ERTAPENEM: SIGNIFICANT CHANGE UP
-  ERTAPENEM: SIGNIFICANT CHANGE UP
-  GENTAMICIN: SIGNIFICANT CHANGE UP
-  GENTAMICIN: SIGNIFICANT CHANGE UP
-  LEVOFLOXACIN: SIGNIFICANT CHANGE UP
-  LEVOFLOXACIN: SIGNIFICANT CHANGE UP
-  MEROPENEM: SIGNIFICANT CHANGE UP
-  MEROPENEM: SIGNIFICANT CHANGE UP
-  NITROFURANTOIN: SIGNIFICANT CHANGE UP
-  NITROFURANTOIN: SIGNIFICANT CHANGE UP
-  PIPERACILLIN/TAZOBACTAM: SIGNIFICANT CHANGE UP
-  PIPERACILLIN/TAZOBACTAM: SIGNIFICANT CHANGE UP
-  TOBRAMYCIN: SIGNIFICANT CHANGE UP
-  TOBRAMYCIN: SIGNIFICANT CHANGE UP
-  TRIMETHOPRIM/SULFAMETHOXAZOLE: SIGNIFICANT CHANGE UP
-  TRIMETHOPRIM/SULFAMETHOXAZOLE: SIGNIFICANT CHANGE UP
ANION GAP SERPL CALC-SCNC: 13 MMOL/L — SIGNIFICANT CHANGE UP (ref 7–14)
ANION GAP SERPL CALC-SCNC: 13 MMOL/L — SIGNIFICANT CHANGE UP (ref 7–14)
BUN SERPL-MCNC: 17 MG/DL — SIGNIFICANT CHANGE UP (ref 7–23)
BUN SERPL-MCNC: 17 MG/DL — SIGNIFICANT CHANGE UP (ref 7–23)
CALCIUM SERPL-MCNC: 8.9 MG/DL — SIGNIFICANT CHANGE UP (ref 8.4–10.5)
CALCIUM SERPL-MCNC: 8.9 MG/DL — SIGNIFICANT CHANGE UP (ref 8.4–10.5)
CHLORIDE SERPL-SCNC: 103 MMOL/L — SIGNIFICANT CHANGE UP (ref 98–107)
CHLORIDE SERPL-SCNC: 103 MMOL/L — SIGNIFICANT CHANGE UP (ref 98–107)
CO2 SERPL-SCNC: 20 MMOL/L — LOW (ref 22–31)
CO2 SERPL-SCNC: 20 MMOL/L — LOW (ref 22–31)
CREAT SERPL-MCNC: 0.69 MG/DL — SIGNIFICANT CHANGE UP (ref 0.5–1.3)
CREAT SERPL-MCNC: 0.69 MG/DL — SIGNIFICANT CHANGE UP (ref 0.5–1.3)
CULTURE RESULTS: ABNORMAL
CULTURE RESULTS: ABNORMAL
EGFR: 119 ML/MIN/1.73M2 — SIGNIFICANT CHANGE UP
EGFR: 119 ML/MIN/1.73M2 — SIGNIFICANT CHANGE UP
GLUCOSE SERPL-MCNC: 84 MG/DL — SIGNIFICANT CHANGE UP (ref 70–99)
GLUCOSE SERPL-MCNC: 84 MG/DL — SIGNIFICANT CHANGE UP (ref 70–99)
HCT VFR BLD CALC: 41.4 % — SIGNIFICANT CHANGE UP (ref 34.5–45)
HCT VFR BLD CALC: 41.4 % — SIGNIFICANT CHANGE UP (ref 34.5–45)
HGB BLD-MCNC: 13.5 G/DL — SIGNIFICANT CHANGE UP (ref 11.5–15.5)
HGB BLD-MCNC: 13.5 G/DL — SIGNIFICANT CHANGE UP (ref 11.5–15.5)
MCHC RBC-ENTMCNC: 29.5 PG — SIGNIFICANT CHANGE UP (ref 27–34)
MCHC RBC-ENTMCNC: 29.5 PG — SIGNIFICANT CHANGE UP (ref 27–34)
MCHC RBC-ENTMCNC: 32.6 GM/DL — SIGNIFICANT CHANGE UP (ref 32–36)
MCHC RBC-ENTMCNC: 32.6 GM/DL — SIGNIFICANT CHANGE UP (ref 32–36)
MCV RBC AUTO: 90.6 FL — SIGNIFICANT CHANGE UP (ref 80–100)
MCV RBC AUTO: 90.6 FL — SIGNIFICANT CHANGE UP (ref 80–100)
METHOD TYPE: SIGNIFICANT CHANGE UP
METHOD TYPE: SIGNIFICANT CHANGE UP
NRBC # BLD: 0 /100 WBCS — SIGNIFICANT CHANGE UP (ref 0–0)
NRBC # BLD: 0 /100 WBCS — SIGNIFICANT CHANGE UP (ref 0–0)
NRBC # FLD: 0 K/UL — SIGNIFICANT CHANGE UP (ref 0–0)
NRBC # FLD: 0 K/UL — SIGNIFICANT CHANGE UP (ref 0–0)
ORGANISM # SPEC MICROSCOPIC CNT: ABNORMAL
PLATELET # BLD AUTO: 165 K/UL — SIGNIFICANT CHANGE UP (ref 150–400)
PLATELET # BLD AUTO: 165 K/UL — SIGNIFICANT CHANGE UP (ref 150–400)
POTASSIUM SERPL-MCNC: 3.8 MMOL/L — SIGNIFICANT CHANGE UP (ref 3.5–5.3)
POTASSIUM SERPL-MCNC: 3.8 MMOL/L — SIGNIFICANT CHANGE UP (ref 3.5–5.3)
POTASSIUM SERPL-SCNC: 3.8 MMOL/L — SIGNIFICANT CHANGE UP (ref 3.5–5.3)
POTASSIUM SERPL-SCNC: 3.8 MMOL/L — SIGNIFICANT CHANGE UP (ref 3.5–5.3)
RBC # BLD: 4.57 M/UL — SIGNIFICANT CHANGE UP (ref 3.8–5.2)
RBC # BLD: 4.57 M/UL — SIGNIFICANT CHANGE UP (ref 3.8–5.2)
RBC # FLD: 13.8 % — SIGNIFICANT CHANGE UP (ref 10.3–14.5)
RBC # FLD: 13.8 % — SIGNIFICANT CHANGE UP (ref 10.3–14.5)
SARS-COV-2 RNA SPEC QL NAA+PROBE: SIGNIFICANT CHANGE UP
SARS-COV-2 RNA SPEC QL NAA+PROBE: SIGNIFICANT CHANGE UP
SODIUM SERPL-SCNC: 136 MMOL/L — SIGNIFICANT CHANGE UP (ref 135–145)
SODIUM SERPL-SCNC: 136 MMOL/L — SIGNIFICANT CHANGE UP (ref 135–145)
SPECIMEN SOURCE: SIGNIFICANT CHANGE UP
SPECIMEN SOURCE: SIGNIFICANT CHANGE UP
WBC # BLD: 6.88 K/UL — SIGNIFICANT CHANGE UP (ref 3.8–10.5)
WBC # BLD: 6.88 K/UL — SIGNIFICANT CHANGE UP (ref 3.8–10.5)
WBC # FLD AUTO: 6.88 K/UL — SIGNIFICANT CHANGE UP (ref 3.8–10.5)
WBC # FLD AUTO: 6.88 K/UL — SIGNIFICANT CHANGE UP (ref 3.8–10.5)

## 2023-11-13 PROCEDURE — 99233 SBSQ HOSP IP/OBS HIGH 50: CPT

## 2023-11-13 PROCEDURE — 99232 SBSQ HOSP IP/OBS MODERATE 35: CPT

## 2023-11-13 RX ADMIN — LIDOCAINE 1 PATCH: 4 CREAM TOPICAL at 00:30

## 2023-11-13 RX ADMIN — Medication 175 MICROGRAM(S): at 05:57

## 2023-11-13 RX ADMIN — Medication 650 MILLIGRAM(S): at 13:00

## 2023-11-13 RX ADMIN — Medication 1 TABLET(S): at 05:57

## 2023-11-13 RX ADMIN — Medication 1 MILLIGRAM(S): at 12:00

## 2023-11-13 RX ADMIN — Medication 25 MILLIGRAM(S): at 21:13

## 2023-11-13 RX ADMIN — Medication 1 MILLIGRAM(S): at 21:12

## 2023-11-13 RX ADMIN — ENOXAPARIN SODIUM 40 MILLIGRAM(S): 100 INJECTION SUBCUTANEOUS at 11:59

## 2023-11-13 RX ADMIN — MOMETASONE FUROATE 2 PUFF(S): 220 INHALANT RESPIRATORY (INHALATION) at 11:59

## 2023-11-13 RX ADMIN — Medication 650 MILLIGRAM(S): at 12:00

## 2023-11-13 NOTE — BH CONSULTATION LIAISON PROGRESS NOTE - NSBHASSESSMENTFT_PSY_ALL_CORE
The patient is a 33 year-old female with PPHx significant for depression, PPHx of fibromyalgia, thyroid cancer s/p resection, and asthma presenting to the ED after a suicide attempt.     As of 11/12/23: Patient with slight improvement in mood. No acute safety concerns noted today. 1:1 CO still present. Evaluation continues to be more suggestive of PTSD vs psychosis given pt's reported h/o multiple traumatic events, recurrent trauma-related nightmares, and trauma-related intrusive thoughts; continues to deny perceptual disturbances and no evidence of paranoia or other delusional thought content. Given ongoing nightmares and resultant poor sleep contributing to mood and pain syndrome, continue prazosin and plan to titrate tomorrow if tolerating. Plan to initiate antidepressant, pt states minimal pain benefit from Cymbalta - confirm pt's dose and if pt has trialed an effective dose consider switch to SSRI for improved mgmt of PTSD related sx.  Of note, pt reports taking low dose naltrexone at home for fibromyalgia-related pain and is interested in resuming this medication inpatient if it is an option.    11/13: Appears depressed, anxious, denies SI/HI. Voluntary hospital admission paper work signed.     Plan:  1. Continue tx at [facility].  2. 1:1 CO for safety s/p SA.  3. Continue prazosin 1mg qHS for nightmare sx related to trauma. Hold off on other serotonergic agents for now as the plan now is to introduce a new antidepressant to the regimen. Can continue to hold patient's Cymbalta and Wellbutrin for now.  4. PRNs: PO hydroxyzine HCl 25mg PO q6h PRN for mild to moderate anxiety. PO Seroquel 25mg q6 for severe agitation.  5. DISPOSITION--likely inpatient psychiatry    The patient is a 33 year-old female with PPHx significant for depression, PPHx of fibromyalgia, thyroid cancer s/p resection, and asthma presenting to the ED after a suicide attempt.     As of 11/12/23: Patient with slight improvement in mood. No acute safety concerns noted today. 1:1 CO still present. Evaluation continues to be more suggestive of PTSD vs psychosis given pt's reported h/o multiple traumatic events, recurrent trauma-related nightmares, and trauma-related intrusive thoughts; continues to deny perceptual disturbances and no evidence of paranoia or other delusional thought content. Given ongoing nightmares and resultant poor sleep contributing to mood and pain syndrome, continue prazosin and plan to titrate tomorrow if tolerating. Plan to initiate antidepressant, pt states minimal pain benefit from Cymbalta - confirm pt's dose and if pt has trialed an effective dose consider switch to SSRI for improved mgmt of PTSD related sx.  Of note, pt reports taking low dose naltrexone at home for fibromyalgia-related pain and is interested in resuming this medication inpatient if it is an option.    11/13: Appears depressed, anxious, denies SI/HI. Voluntary hospital admission paper work signed.     Plan:  1. Continue tx at [facility].  2. 1:1 CO for safety s/p SA.  3. Continue prazosin 1mg qHS for nightmare sx related to trauma. Hold off on other serotonergic agents for now as the plan now is to introduce a new antidepressant to the regimen. Can continue to hold patient's Cymbalta and Wellbutrin for now.  4. PRNs: PO hydroxyzine HCl 25mg PO q6h PRN for mild to moderate anxiety. PO Seroquel 25mg q6 for severe agitation.  5. DISPOSITION--inpatient psychiatry. 9.13 signed. Used  to translate the 9.13 paperwork.

## 2023-11-13 NOTE — BH CONSULTATION LIAISON PROGRESS NOTE - NSBHFUPINTERVALHXFT_PSY_A_CORE
Chart reviewed, no acute events overnight. Patient seen at bedside with  present. Appears anxious, distraught, tearful throughout the interview. States "my head hurts". Denies depression, hopelessness at this time. Explained her mood is "ok" because she is not able to focus on anything besides her headache. Denies SI/ HI today. Slept 5-6 hours last night. [place] tired but was not able to sleep through the night, not restful, denies nightmares last night. Pt. unsure her previous dose of Cymbalta, but did not feel as if it was helpful. Stated Wellbutrin was helpful the first few days, but has not been helpful since. Discussed next steps with pt., agreed inpatient psychiatric hospitalization would be beneficial. Voluntary admission paperwork signed.  Chart reviewed, no acute events overnight. Patient seen at bedside with  present. Appears anxious, distraught, tearful throughout the interview. States "my head hurts". Denies depression, hopelessness at this time. Explained her mood is "ok" because she is not able to focus on anything besides her headache. Denies SI/ HI today. Slept 5-6 hours last night. [place] tired but was not able to sleep through the night, not restful, denies nightmares last night. Pt. unsure her previous dose of Cymbalta, but did not feel as if it was helpful. Stated Wellbutrin was helpful the first few days, but has not been helpful since. Denies any si today, denies any psychosis. Discussed next steps with pt., agreed inpatient psychiatric hospitalization would be beneficial. Voluntary admission paperwork signed.

## 2023-11-13 NOTE — PROGRESS NOTE ADULT - PROBLEM SELECTOR PLAN 1
pt admits to taking 4 buproprion tabs, intentional overdose  - appreciate tox recs: EKG without QRS or QTC prolongation, c/w supportive care, observe for 8 hours from time of ingestion and monitor on tele, monitor for signs of seizures, serotonin toxicity  - appreciate BH recs: c/w seroquel 25mg q6h prn, start prazosin 1mg hs for nightmares, holding wellbutrin/cymbalta  - c/w 1:1 observation, needs inpatient psych

## 2023-11-14 ENCOUNTER — INPATIENT (INPATIENT)
Facility: HOSPITAL | Age: 33
LOS: 28 days | Discharge: HOME | End: 2023-12-13
Attending: PSYCHIATRY & NEUROLOGY | Admitting: PSYCHIATRY & NEUROLOGY
Payer: MEDICAID

## 2023-11-14 VITALS
SYSTOLIC BLOOD PRESSURE: 121 MMHG | RESPIRATION RATE: 17 BRPM | HEART RATE: 91 BPM | OXYGEN SATURATION: 100 % | TEMPERATURE: 98 F | DIASTOLIC BLOOD PRESSURE: 72 MMHG

## 2023-11-14 VITALS — HEIGHT: 61 IN | WEIGHT: 154.1 LBS

## 2023-11-14 DIAGNOSIS — T43.292A POISONING BY OTHER ANTIDEPRESSANTS, INTENTIONAL SELF-HARM, INITIAL ENCOUNTER: ICD-10-CM

## 2023-11-14 DIAGNOSIS — Z98.891 HISTORY OF UTERINE SCAR FROM PREVIOUS SURGERY: Chronic | ICD-10-CM

## 2023-11-14 DIAGNOSIS — F32.9 MAJOR DEPRESSIVE DISORDER, SINGLE EPISODE, UNSPECIFIED: ICD-10-CM

## 2023-11-14 PROBLEM — F41.9 ANXIETY DISORDER, UNSPECIFIED: Chronic | Status: ACTIVE | Noted: 2023-11-10

## 2023-11-14 PROBLEM — C73 MALIGNANT NEOPLASM OF THYROID GLAND: Chronic | Status: ACTIVE | Noted: 2023-11-10

## 2023-11-14 LAB
APPEARANCE UR: CLEAR — SIGNIFICANT CHANGE UP
APPEARANCE UR: CLEAR — SIGNIFICANT CHANGE UP
BACTERIA # UR AUTO: NEGATIVE /HPF — SIGNIFICANT CHANGE UP
BACTERIA # UR AUTO: NEGATIVE /HPF — SIGNIFICANT CHANGE UP
BILIRUB UR-MCNC: NEGATIVE — SIGNIFICANT CHANGE UP
BILIRUB UR-MCNC: NEGATIVE — SIGNIFICANT CHANGE UP
CAST: 1 /LPF — SIGNIFICANT CHANGE UP (ref 0–4)
CAST: 1 /LPF — SIGNIFICANT CHANGE UP (ref 0–4)
COLOR SPEC: YELLOW — SIGNIFICANT CHANGE UP
COLOR SPEC: YELLOW — SIGNIFICANT CHANGE UP
DIFF PNL FLD: NEGATIVE — SIGNIFICANT CHANGE UP
DIFF PNL FLD: NEGATIVE — SIGNIFICANT CHANGE UP
GLUCOSE UR QL: NEGATIVE MG/DL — SIGNIFICANT CHANGE UP
GLUCOSE UR QL: NEGATIVE MG/DL — SIGNIFICANT CHANGE UP
KETONES UR-MCNC: NEGATIVE MG/DL — SIGNIFICANT CHANGE UP
KETONES UR-MCNC: NEGATIVE MG/DL — SIGNIFICANT CHANGE UP
LEUKOCYTE ESTERASE UR-ACNC: ABNORMAL
LEUKOCYTE ESTERASE UR-ACNC: ABNORMAL
NITRITE UR-MCNC: NEGATIVE — SIGNIFICANT CHANGE UP
NITRITE UR-MCNC: NEGATIVE — SIGNIFICANT CHANGE UP
PH UR: 6 — SIGNIFICANT CHANGE UP (ref 5–8)
PH UR: 6 — SIGNIFICANT CHANGE UP (ref 5–8)
PROT UR-MCNC: NEGATIVE MG/DL — SIGNIFICANT CHANGE UP
PROT UR-MCNC: NEGATIVE MG/DL — SIGNIFICANT CHANGE UP
RBC CASTS # UR COMP ASSIST: 0 /HPF — SIGNIFICANT CHANGE UP (ref 0–4)
RBC CASTS # UR COMP ASSIST: 0 /HPF — SIGNIFICANT CHANGE UP (ref 0–4)
SP GR SPEC: 1.01 — SIGNIFICANT CHANGE UP (ref 1–1.03)
SP GR SPEC: 1.01 — SIGNIFICANT CHANGE UP (ref 1–1.03)
SQUAMOUS # UR AUTO: 0 /HPF — SIGNIFICANT CHANGE UP (ref 0–5)
SQUAMOUS # UR AUTO: 0 /HPF — SIGNIFICANT CHANGE UP (ref 0–5)
UROBILINOGEN FLD QL: 0.2 MG/DL — SIGNIFICANT CHANGE UP (ref 0.2–1)
UROBILINOGEN FLD QL: 0.2 MG/DL — SIGNIFICANT CHANGE UP (ref 0.2–1)
WBC UR QL: 1 /HPF — SIGNIFICANT CHANGE UP (ref 0–5)
WBC UR QL: 1 /HPF — SIGNIFICANT CHANGE UP (ref 0–5)

## 2023-11-14 PROCEDURE — 99233 SBSQ HOSP IP/OBS HIGH 50: CPT

## 2023-11-14 PROCEDURE — 99239 HOSP IP/OBS DSCHRG MGMT >30: CPT

## 2023-11-14 RX ORDER — BUPROPION HYDROCHLORIDE 150 MG/1
1 TABLET, EXTENDED RELEASE ORAL
Refills: 0 | DISCHARGE

## 2023-11-14 RX ORDER — PRAZOSIN HCL 2 MG
1 CAPSULE ORAL AT BEDTIME
Refills: 0 | Status: DISCONTINUED | OUTPATIENT
Start: 2023-11-14 | End: 2023-11-22

## 2023-11-14 RX ORDER — TRAZODONE HCL 50 MG
50 TABLET ORAL AT BEDTIME
Refills: 0 | Status: DISCONTINUED | OUTPATIENT
Start: 2023-11-14 | End: 2023-12-13

## 2023-11-14 RX ORDER — HYDROXYZINE HCL 10 MG
1 TABLET ORAL
Refills: 0 | DISCHARGE

## 2023-11-14 RX ORDER — GABAPENTIN 400 MG/1
100 CAPSULE ORAL THREE TIMES A DAY
Refills: 0 | Status: DISCONTINUED | OUTPATIENT
Start: 2023-11-14 | End: 2023-11-15

## 2023-11-14 RX ORDER — ACETAMINOPHEN 500 MG
650 TABLET ORAL EVERY 6 HOURS
Refills: 0 | Status: DISCONTINUED | OUTPATIENT
Start: 2023-11-14 | End: 2023-12-13

## 2023-11-14 RX ORDER — LEVOTHYROXINE SODIUM 125 MCG
1 TABLET ORAL
Refills: 0 | DISCHARGE

## 2023-11-14 RX ORDER — FOLIC ACID 0.8 MG
1 TABLET ORAL DAILY
Refills: 0 | Status: DISCONTINUED | OUTPATIENT
Start: 2023-11-14 | End: 2023-12-13

## 2023-11-14 RX ORDER — DULOXETINE HYDROCHLORIDE 30 MG/1
1 CAPSULE, DELAYED RELEASE ORAL
Refills: 0 | DISCHARGE

## 2023-11-14 RX ORDER — LANOLIN ALCOHOL/MO/W.PET/CERES
1 CREAM (GRAM) TOPICAL
Qty: 0 | Refills: 0 | DISCHARGE
Start: 2023-11-14

## 2023-11-14 RX ORDER — LEVOTHYROXINE SODIUM 125 MCG
1 TABLET ORAL
Qty: 0 | Refills: 0 | DISCHARGE
Start: 2023-11-14

## 2023-11-14 RX ORDER — OLANZAPINE 15 MG/1
5 TABLET, FILM COATED ORAL EVERY 6 HOURS
Refills: 0 | Status: DISCONTINUED | OUTPATIENT
Start: 2023-11-14 | End: 2023-12-13

## 2023-11-14 RX ORDER — LEVOTHYROXINE SODIUM 125 MCG
175 TABLET ORAL DAILY
Refills: 0 | Status: DISCONTINUED | OUTPATIENT
Start: 2023-11-15 | End: 2023-12-13

## 2023-11-14 RX ORDER — CALCIUM CARBONATE 500(1250)
1 TABLET ORAL
Refills: 0 | DISCHARGE

## 2023-11-14 RX ORDER — PRAZOSIN HCL 2 MG
1 CAPSULE ORAL
Qty: 0 | Refills: 0 | DISCHARGE
Start: 2023-11-14

## 2023-11-14 RX ORDER — HYDROXYZINE HCL 10 MG
1 TABLET ORAL
Qty: 0 | Refills: 0 | DISCHARGE
Start: 2023-11-14

## 2023-11-14 RX ORDER — LIDOCAINE 4 G/100G
1 CREAM TOPICAL DAILY
Refills: 0 | Status: DISCONTINUED | OUTPATIENT
Start: 2023-11-14 | End: 2023-11-21

## 2023-11-14 RX ORDER — HYDROXYZINE HCL 10 MG
25 TABLET ORAL EVERY 6 HOURS
Refills: 0 | Status: DISCONTINUED | OUTPATIENT
Start: 2023-11-14 | End: 2023-12-13

## 2023-11-14 RX ORDER — QUETIAPINE FUMARATE 200 MG/1
1 TABLET, FILM COATED ORAL
Qty: 0 | Refills: 0 | DISCHARGE
Start: 2023-11-14

## 2023-11-14 RX ORDER — ACETAMINOPHEN 500 MG
650 TABLET ORAL EVERY 6 HOURS
Refills: 0 | Status: DISCONTINUED | OUTPATIENT
Start: 2023-11-14 | End: 2023-11-14

## 2023-11-14 RX ORDER — SERTRALINE 25 MG/1
50 TABLET, FILM COATED ORAL DAILY
Refills: 0 | Status: DISCONTINUED | OUTPATIENT
Start: 2023-11-14 | End: 2023-11-20

## 2023-11-14 RX ORDER — MOMETASONE FUROATE 220 UG/1
2 INHALANT RESPIRATORY (INHALATION) DAILY
Refills: 0 | Status: DISCONTINUED | OUTPATIENT
Start: 2023-11-14 | End: 2023-11-17

## 2023-11-14 RX ORDER — OXYBUTYNIN CHLORIDE 5 MG
1 TABLET ORAL
Refills: 0 | DISCHARGE

## 2023-11-14 RX ORDER — NALTREXONE HYDROCHLORIDE 50 MG/1
1 TABLET, FILM COATED ORAL
Refills: 0 | DISCHARGE

## 2023-11-14 RX ADMIN — Medication 650 MILLIGRAM(S): at 16:50

## 2023-11-14 RX ADMIN — Medication 175 MICROGRAM(S): at 06:24

## 2023-11-14 RX ADMIN — ENOXAPARIN SODIUM 40 MILLIGRAM(S): 100 INJECTION SUBCUTANEOUS at 12:47

## 2023-11-14 RX ADMIN — Medication 650 MILLIGRAM(S): at 02:00

## 2023-11-14 RX ADMIN — Medication 1 MILLIGRAM(S): at 12:47

## 2023-11-14 RX ADMIN — LIDOCAINE 1 PATCH: 4 CREAM TOPICAL at 23:26

## 2023-11-14 RX ADMIN — Medication 1 MILLIGRAM(S): at 21:19

## 2023-11-14 RX ADMIN — Medication 650 MILLIGRAM(S): at 01:05

## 2023-11-14 RX ADMIN — GABAPENTIN 100 MILLIGRAM(S): 400 CAPSULE ORAL at 21:19

## 2023-11-14 RX ADMIN — MOMETASONE FUROATE 2 PUFF(S): 220 INHALANT RESPIRATORY (INHALATION) at 12:48

## 2023-11-14 NOTE — BH INPATIENT PSYCHIATRY ASSESSMENT NOTE - HPI (INCLUDE ILLNESS QUALITY, SEVERITY, DURATION, TIMING, CONTEXT, MODIFYING FACTORS, ASSOCIATED SIGNS AND SYMPTOMS)
Patient is a 32 y/o  woman, she is  with a 4y/o, currently domiciled with her , unemployed and supported by her family, patient is Italian speaker; that was admitted initially to the medical floor after she tried to commit suicide by OD on Wellbutrin. Patient has been medically clear and later on transfer to the unit 2W.     Today patient was seen and evaluated in the treatment room by her self, this was a face to face evaluation. Interview was conducted in Italian by this writer. Patient was very depressed, crying and she looks sad. She is a good historian and was able to give accurate information for this note. Patient's thought process is lineal and goal directed.     Patient reported that she started feeling depressed after she had her son. When the child was 3mo she was diagnosed with thyroid cancer and was told she was not going to be able to have more children. This was specially hard as she comes from a big family. Then she need to be in cancer treatment and her cancer metastasized. Patient then continue to struggle with a lot of pain, depression and anxiety. Now the cancer in on reemission but after her surgery she needed PT and SLP. She started to see a doctor vie telehealth that put her on Amitriptyline that was effective for some time. She then OD on this medication and the psychiatrist told her she needed to be in a higher level of care but she was not able to get her self into a PHP. She was then changed to Cymbalta and Wellbutrin prescribed by her PCP. She has continue to struggle with her pain, depression and anxiety. Her son was diagnosed with autism and this has been very hard on her.     Before coming to the hospital patient said that she was not able to cope with her symptoms of depression that she describes as feeling sad all the time, she is hopeless, helpless, doesn't sleep or eats. She has been experiencing SI and is overwhelmed. Patient decided to take an OD on her medications and then she talk to the  that brought her to the crisis center.     At the moment of the assessment patient said that she is feeling safe in the unit. She denies any active SI and has been able to contract for safety. Patient denies any HI or hallucinations and no delusions have been elicited. She is in agreement with staying voluntarily and work with us in a safe discharge plan.      I call patient's  as patient agreed, he was not available and I left a message.     As per C&L note   33 year old female with PMH significant for depression, fibromyalgia, thyroid cancer s/p resection, and asthma presenting to the ED after a suicide attempt.     Patient reports that she had been feeling an increase feeling of anxiety and depression. She had been feeling "down" and did not want her son to see her in her current state and decided to take pills in an attempt to kill herself. She views herself as a burden for her family and wants them to have a better life without her. After she had taken the pills she got sleepy, vomited, and then told her spouse to bring her to the hospital. She did not say that she had planned the attempt, and it was in the moment that she decided to take the pills. Patient also said that she only took 4 pills because she wasn't sure if she had really wanted to die. Patient endorsed symptoms of depression(guilt, increased appetite, decreased sleep, depressed mood, suicidality, hopelessness) starting around 2 years ago when she started to have procedures for her Thyroid cancer. Her son has autism, and feels as if she has a lot of responsibilities to handle between the health of her son and herself. Denies any symptoms of juan. Denies HI, NSSIB, or current SI.  Endorsed feelings of passive SI in the past.  Possible AH of family members calling out her middle name, as well as delusions of paranoia (people talking about her). No drug or alcohol use.     Per Spouse:   Patient has been feeling depressed roughly 1 and a half years ago during which she was going through procedures for her thyroid cancer. Reports that the depression has gotten worse starting a few months ago, patient would frequently cry and have panic attacks (can't breath, heart racing). Is still able to enjoy moments playing with her son, but primarily has been feeling down. She has often spoke about not wanting to live anymore to the spouse, but did not mention any specific plan. She has not been sleeping. Spouse reported that she was once in an outpatient therapy program, but was unable to provide any information.

## 2023-11-14 NOTE — BH CONSULTATION LIAISON PROGRESS NOTE - NSBHFUPINTERVALHXFT_PSY_A_CORE
Chart reviewed, PRN hydroxyzine 25mg given last night. Patient seen at bedside. A&Ox3. Pt. sleeping in bed upon entering. States " I feel better than other days". When asked what has changed, pt. said she was able to "rest more" last night. Endorsed difficulty falling asleep, but woke up less frequently throughout the night than previous days, denies nightmares. Appetite is "normal". Endorses feeling sad because she wants to be home. Denies SI/ HI. Last time she had thoughts of hurting herself was before she came to the hospital. Inquired about female only staff at [facility]. States she does not feel comfortable around men besides her  and brothers.  visited at the end of the exam with an apparent brightening in pts. affect.

## 2023-11-14 NOTE — PROGRESS NOTE ADULT - ASSESSMENT
33 year old female with PMH significant for depression, fibromyalgia, thyroid cancer s/p resection, and asthma presenting to the ED after a suicide attempt.
33F with hx of depression, fibromyalgia, thyroid cancer s/p resection (on synthroid), and asthma presenting to the ED after a suicide attempt.
33F with hx of depression, fibromyalgia, thyroid cancer s/p resection (on synthroid), and asthma presenting to the ED after a suicide attempt.

## 2023-11-14 NOTE — BH CONSULTATION LIAISON PROGRESS NOTE - NSBHATTESTCOMMENTATTENDFT_PSY_A_CORE
met with the patient along with pa-s impression and plan discussed and agreed upon
met with the patient. case discussed with rosi impression and plan discussed and agreed upon  Patient and  aware of the 2 west admission. I agreement.   Gave  contact information for the unit.   Above plan discussed with medicine team, and sw.  Hand off given to [facility] 2 west- Didi Wiley-- 4pm

## 2023-11-14 NOTE — PROGRESS NOTE ADULT - PROBLEM SELECTOR PROBLEM 6
Hypothyroidism
Hypothyroidism
Need for prophylactic measure

## 2023-11-14 NOTE — BH CONSULTATION LIAISON PROGRESS NOTE - NSBHCHARTREVIEWVS_PSY_A_CORE FT
Vital Signs Last 24 Hrs  T(C): 36.8 (12 Nov 2023 05:09), Max: 36.9 (11 Nov 2023 22:30)  T(F): 98.3 (12 Nov 2023 05:09), Max: 98.4 (11 Nov 2023 22:30)  HR: 69 (12 Nov 2023 05:09) (69 - 81)  BP: 108/64 (12 Nov 2023 05:09) (108/64 - 116/73)  BP(mean): --  RR: 18 (12 Nov 2023 05:09) (18 - 18)  SpO2: 98% (12 Nov 2023 05:09) (98% - 100%)    Parameters below as of 12 Nov 2023 05:09  Patient On (Oxygen Delivery Method): room air    
Vital Signs Last 24 Hrs  T(C): 36.7 (11 Nov 2023 05:30), Max: 36.8 (10 Nov 2023 12:55)  T(F): 98.1 (11 Nov 2023 05:30), Max: 98.3 (10 Nov 2023 12:55)  HR: 58 (11 Nov 2023 05:30) (58 - 84)  BP: 108/61 (11 Nov 2023 05:30) (107/68 - 124/86)  BP(mean): --  RR: 18 (11 Nov 2023 05:30) (17 - 18)  SpO2: 98% (11 Nov 2023 05:30) (98% - 99%)    Parameters below as of 11 Nov 2023 05:30  Patient On (Oxygen Delivery Method): room air    
Vital Signs Last 24 Hrs  T(C): 36.7 (14 Nov 2023 05:31), Max: 36.8 (13 Nov 2023 12:30)  T(F): 98.1 (14 Nov 2023 05:31), Max: 98.2 (13 Nov 2023 12:30)  HR: 78 (14 Nov 2023 05:31) (68 - 83)  BP: 113/75 (14 Nov 2023 05:31) (105/65 - 125/82)  BP(mean): --  RR: 18 (14 Nov 2023 05:31) (17 - 18)  SpO2: 100% (14 Nov 2023 05:31) (95% - 100%)    Parameters below as of 14 Nov 2023 05:31  Patient On (Oxygen Delivery Method): room air    
Vital Signs Last 24 Hrs  T(C): 36.6 (13 Nov 2023 05:01), Max: 37.8 (12 Nov 2023 20:47)  T(F): 97.8 (13 Nov 2023 05:01), Max: 100 (12 Nov 2023 20:47)  HR: 90 (13 Nov 2023 05:01) (81 - 90)  BP: 113/71 (13 Nov 2023 05:01) (113/71 - 156/56)  BP(mean): 82 (13 Nov 2023 05:01) (82 - 82)  RR: 18 (13 Nov 2023 05:01) (18 - 19)  SpO2: 99% (13 Nov 2023 05:01) (99% - 100%)    Parameters below as of 13 Nov 2023 05:01  Patient On (Oxygen Delivery Method): room air

## 2023-11-14 NOTE — PROGRESS NOTE ADULT - REASON FOR ADMISSION
intentional overdose

## 2023-11-14 NOTE — BH PATIENT PROFILE - NSBHTHTPROCESS_PSY_A_CORE
Patient refill request for:    metoPROLOL succinate (TOPROL-XL) 25 MG 24 hr tablet 90 tablet 1 8/9/2021     Sig - Route: Take 1 tablet by mouth daily. - Oral      Last visit: 12/14/2021  Next visit: 4/25/2022    Prescription refilled per protocol.   goal directed

## 2023-11-14 NOTE — PROGRESS NOTE ADULT - PROBLEM SELECTOR PLAN 4
-w/ chronic neuropathy however exacerbated by med overdose. No acute neuro deficits   -hold duloxetine and lyrica for now as psych meds are being adjusted
no evidence of acute exacerbation   - on breztri and qvar inhaler, c/w mometasone   - on tezspire injection as outpt  - albuterol prn
no evidence of acute exacerbation   - on breztri and qvar inhaler, c/w mometasone   - on tezspire injection as outpt  - albuterol prn
-w/ chronic neuropathy however exacerbated by med overdose. No acute neuro deficits   -hold duloxetine and lyrica for now as psych meds are being adjusted
-w/ chronic neuropathy however exacerbated by med overdose. No acute neuro deficits   -hold duloxetine and lyrica for now. Restart after out of observation period and if cleared by psych

## 2023-11-14 NOTE — BH PATIENT PROFILE - HOME MEDICATIONS
levothyroxine 175 mcg (0.175 mg) oral tablet , 1 tab(s) orally once a day  melatonin 3 mg oral tablet , 1 tab(s) orally once a day (at bedtime) As needed Insomnia  hydrOXYzine hydrochloride 25 mg oral tablet , 1 tab(s) orally every 6 hours As needed Anxiety  QUEtiapine 25 mg oral tablet , 1 tab(s) orally every 6 hours As needed agitation  prazosin 1 mg oral capsule , 1 cap(s) orally once a day (at bedtime)  folic acid 1 mg oral tablet , 1 tab(s) orally once a day  Citrucel 500 mg oral tablet , 3 tab(s) orally once a day  ferrous sulfate 325 mg (65 mg elemental iron) oral tablet , 1 tab(s) orally 2 times a day  Qvar 80 mcg/inh inhalation aerosol , 1 puff(s) inhaled 2 times a day  Albuterol (Eqv-Proventil HFA) 90 mcg/inh inhalation aerosol , 2 puff(s) inhaled every 6 hours as needed for  shortness of breath and/or wheezing  Breztri Aerosphere 160 mcg-9 mcg-4.8 mcg/inh inhalation aerosol , 2 puff(s) inhaled 2 times a day  Tezspire Pre-filled Pen 210 mg/1.91 mL subcutaneous solution , 210 milligram(s) subcutaneously once a month

## 2023-11-14 NOTE — BH CONSULTATION LIAISON PROGRESS NOTE - NSICDXBHSECONDARYDX_PSY_ALL_CORE
Suicide attempt   T14.91XA  Hypothyroidism   E03.9  Anxiety and depression   F41.9  Fibromyalgia   M79.7  
Suicide attempt   T14.91XA  Major depressive disorder   F32.9  Post traumatic stress disorder (PTSD)   F43.10  
Suicide attempt   T14.91XA  Major depressive disorder   F32.9  Post traumatic stress disorder (PTSD)   F43.10

## 2023-11-14 NOTE — BH INPATIENT PSYCHIATRY ASSESSMENT NOTE - CURRENT MEDICATION
MEDICATIONS  (STANDING):  acetaminophen     Tablet .. 650 milliGRAM(s) Oral every 6 hours  folic acid 1 milliGRAM(s) Oral daily  gabapentin 100 milliGRAM(s) Oral three times a day  mometasone 220 MICROgram(s) Inhaler 2 Puff(s) Inhalation daily  prazosin. 1 milliGRAM(s) Oral at bedtime  sertraline 50 milliGRAM(s) Oral daily    MEDICATIONS  (PRN):  hydrOXYzine hydrochloride 25 milliGRAM(s) Oral every 6 hours PRN Anxiety  OLANZapine 5 milliGRAM(s) Oral every 6 hours PRN Severe agitation or psychosis  traZODone 50 milliGRAM(s) Oral at bedtime PRN Insomnia   MEDICATIONS  (STANDING):  folic acid 1 milliGRAM(s) Oral daily  gabapentin 100 milliGRAM(s) Oral three times a day  mometasone 220 MICROgram(s) Inhaler 2 Puff(s) Inhalation daily  prazosin. 1 milliGRAM(s) Oral at bedtime  sertraline 50 milliGRAM(s) Oral daily    MEDICATIONS  (PRN):  acetaminophen     Tablet .. 650 milliGRAM(s) Oral every 6 hours PRN Mild Pain (1 - 3)  hydrOXYzine hydrochloride 25 milliGRAM(s) Oral every 6 hours PRN Anxiety  OLANZapine 5 milliGRAM(s) Oral every 6 hours PRN Severe agitation or psychosis  traZODone 50 milliGRAM(s) Oral at bedtime PRN Insomnia

## 2023-11-14 NOTE — BH CONSULTATION LIAISON PROGRESS NOTE - NSICDXBHTERTIARYDX_PSY_ALL_CORE
R/O Major depressive disorder with psychotic features   F32.3  
R/O Major depressive disorder with psychotic features   F32.3

## 2023-11-14 NOTE — BH CONSULTATION LIAISON PROGRESS NOTE - NSBHATTESTTYPEVISIT_PSY_A_CORE
Resident/Fellow with telephonic supervision
Attending with Resident/Fellow/Student
Attending with Resident/Fellow/Student
Resident/Fellow with telephonic supervision

## 2023-11-14 NOTE — PROGRESS NOTE ADULT - PROBLEM SELECTOR PLAN 6
hx of thyroid cancer s/p resection  - TSH 3.3, c/w synthroid
hx of thyroid cancer s/p resection  - TSH 3.3, c/w synthroid
dvt ppx: lovenox

## 2023-11-14 NOTE — BH PATIENT PROFILE - PROVIDE DETAILS
"The father of my niece, he's an aggressive person but doesn't live here, he lives in [facility] [place]. I am also afraid of my ex-boyfriend)

## 2023-11-14 NOTE — DISCHARGE NOTE NURSING/CASE MANAGEMENT/SOCIAL WORK - PATIENT PORTAL LINK FT
You can access the FollowMyHealth Patient Portal offered by [facility] by registering at the following website: [email or web address]. By joining [employer]’s FollowMyHealth portal, you will also be able to view your health information using other applications (apps) compatible with our system.

## 2023-11-14 NOTE — PROGRESS NOTE ADULT - PROBLEM SELECTOR PLAN 2
-hold buproprion in the setting of SI  - consult in am
-hold buproprion in the setting of SI  -f/u BH as above
presents with SI  - appreciate  recs: c/w seroquel 25mg q6h prn, start prazosin 1mg hs for nightmares, holding wellbutrin/cymbalta  - c/w 1:1 observation, needs inpatient psych
-hold buproprion in the setting of SI  -f/u BH as above
presents with SI  - appreciate  recs: c/w seroquel 25mg q6h prn, start prazosin 1mg hs for nightmares, holding wellbutrin/cymbalta  - c/w 1:1 observation, needs inpatient psych

## 2023-11-14 NOTE — BH PATIENT PROFILE - STATED REASON FOR ADMISSION
" I took pills because I didn't want to live anymore. The pain from my fibromyalgia makes me anxious"

## 2023-11-14 NOTE — BH CONSULTATION LIAISON PROGRESS NOTE - NSBHFUPREASONCONS_PSY_A_CORE
suicidality/anxiety/depression
suicidality
suicidality/anxiety/depression
suicidality/anxiety/depression

## 2023-11-14 NOTE — BH PATIENT PROFILE - SELF-CONCEPT, DO YOU LIKE YOURSELF, PROFILE
"when I look in the mirror I don't like the way I look, I don't like the way I look when I wake up, I don't like the way I look in general"/no

## 2023-11-14 NOTE — BH CONSULTATION LIAISON PROGRESS NOTE - NSBHASSESSMENTFT_PSY_ALL_CORE
The patient is a 33 year-old female with PPHx significant for depression, PPHx of fibromyalgia, thyroid cancer s/p resection, and asthma presenting to the ED after a suicide attempt.     As of 11/12/23: Patient with slight improvement in mood. No acute safety concerns noted today. 1:1 CO still present. Evaluation continues to be more suggestive of PTSD vs psychosis given pt's reported h/o multiple traumatic events, recurrent trauma-related nightmares, and trauma-related intrusive thoughts; continues to deny perceptual disturbances and no evidence of paranoia or other delusional thought content. Given ongoing nightmares and resultant poor sleep contributing to mood and pain syndrome, continue prazosin and plan to titrate tomorrow if tolerating. Plan to initiate antidepressant, pt states minimal pain benefit from Cymbalta - confirm pt's dose and if pt has trialed an effective dose consider switch to SSRI for improved mgmt of PTSD related sx.  Of note, pt reports taking low dose naltrexone at home for fibromyalgia-related pain and is interested in resuming this medication inpatient if it is an option.    11/13: Appears depressed, anxious, denies SI/HI. Voluntary hospital admission paper work signed.   11/14: Continues to endorse sadness, denies SI/HI. Remains willing to go to [facility], inquired about a female only unit/staffing.    Plan:  1. Continue tx at [facility].  2. 1:1 CO for safety s/p SA.  3. Continue prazosin 1mg qHS for nightmare sx related to trauma. Hold off on other serotonergic agents for now as the plan now is to introduce a new antidepressant to the regimen. Can continue to hold patient's Cymbalta and Wellbutrin for now.  4. PRNs: PO hydroxyzine HCl 25mg PO q6h PRN for mild to moderate anxiety. PO Seroquel 25mg q6 for severe agitation.  5. DISPOSITION--inpatient psychiatry. 9.13 signed. Used  to translate the 9.13 paperwork.

## 2023-11-14 NOTE — PROGRESS NOTE ADULT - PROBLEM SELECTOR PLAN 7
DVT ppx: lovenox  DIET: Regular  DISPO: ZOHAIB DVT ppx: lovenox  DIET: Regular  DISPO: [facility]. 37 mins spent dc planning.

## 2023-11-14 NOTE — PROGRESS NOTE ADULT - PROBLEM SELECTOR PROBLEM 7
Need for prophylactic measure
Urinary tract infection
Need for prophylactic measure

## 2023-11-14 NOTE — BH CONSULTATION LIAISON PROGRESS NOTE - NSBHATTESTBILLING_PSY_A_CORE
Non-billable
70414-Rjiimsgzwh OBS or IP - high complexity OR 50-79 mins
[id number]-Rrsjmfrfdb OBS or IP - high complexity OR 50-79 mins
Non-billable

## 2023-11-14 NOTE — BH INPATIENT PSYCHIATRY ASSESSMENT NOTE - DESCRIPTION
Lives at home with  and spouse. Immigrated from [place]. Highest level of academic achievement was 4 years of college in [place] but did not finish degree.

## 2023-11-14 NOTE — BH CONSULTATION LIAISON PROGRESS NOTE - NSBHCHARTREVIEWLAB_PSY_A_CORE FT
CBC Full  -  ( 13 Nov 2023 07:11 )  WBC Count : 6.88 K/uL  RBC Count : 4.57 M/uL  Hemoglobin : 13.5 g/dL  Hematocrit : 41.4 %  Platelet Count - Automated : 165 K/uL  Mean Cell Volume : 90.6 fL  Mean Cell Hemoglobin : 29.5 pg  Mean Cell Hemoglobin Concentration : 32.6 gm/dL  Auto Neutrophil # : x  Auto Lymphocyte # : x  Auto Monocyte # : x  Auto Eosinophil # : x  Auto Basophil # : x  Auto Neutrophil % : x  Auto Lymphocyte % : x  Auto Monocyte % : x  Auto Eosinophil % : x  Auto Basophil % : x  11-13    136  |  103  |  17  ----------------------------<  84  3.8   |  20<L>  |  0.69    Ca    8.9      13 Nov 2023 07:11    
                      13.1   7.18  )-----------( 161      ( 11 Nov 2023 05:47 )             40.1     11-11    137  |  104  |  13  ----------------------------<  85  3.7   |  23  |  0.60    Ca    8.6      11 Nov 2023 05:47    
CBC Full  -  ( 11 Nov 2023 05:47 )  WBC Count : 7.18 K/uL  RBC Count : 4.49 M/uL  Hemoglobin : 13.1 g/dL  Hematocrit : 40.1 %  Platelet Count - Automated : 161 K/uL  Mean Cell Volume : 89.3 fL  Mean Cell Hemoglobin : 29.2 pg  Mean Cell Hemoglobin Concentration : 32.7 gm/dL  Auto Neutrophil # : x  Auto Lymphocyte # : x  Auto Monocyte # : x  Auto Eosinophil # : x  Auto Basophil # : x  Auto Neutrophil % : x  Auto Lymphocyte % : x  Auto Monocyte % : x  Auto Eosinophil % : x  Auto Basophil % : x    11-11    137  |  104  |  13  ----------------------------<  85  3.7   |  23  |  0.60    Ca    8.6      11 Nov 2023 05:47  Phos  3.4     11-10  Mg     2.10     11-10    TPro  7.5  /  Alb  4.4  /  TBili  0.4  /  DBili  x   /  AST  18  /  ALT  17  /  AlkPhos  76  11-09
CBC Full  -  ( 13 Nov 2023 07:11 )  WBC Count : 6.88 K/uL  RBC Count : 4.57 M/uL  Hemoglobin : 13.5 g/dL  Hematocrit : 41.4 %  Platelet Count - Automated : 165 K/uL  Mean Cell Volume : 90.6 fL  Mean Cell Hemoglobin : 29.5 pg  Mean Cell Hemoglobin Concentration : 32.6 gm/dL  Auto Neutrophil # : x  Auto Lymphocyte # : x  Auto Monocyte # : x  Auto Eosinophil # : x  Auto Basophil # : x  Auto Neutrophil % : x  Auto Lymphocyte % : x  Auto Monocyte % : x  Auto Eosinophil % : x  Auto Basophil % : x  11-13    136  |  103  |  17  ----------------------------<  84  3.8   |  20<L>  |  0.69    Ca    8.9      13 Nov 2023 07:11

## 2023-11-14 NOTE — BH INPATIENT PSYCHIATRY ASSESSMENT NOTE - NSBHCHARTREVIEWVS_PSY_A_CORE FT
Vital Signs Last 24 Hrs  T(C): 36.8 (11-14-23 @ 14:43), Max: 36.8 (11-14-23 @ 14:43)  T(F): 98.2 (11-14-23 @ 14:43), Max: 98.2 (11-14-23 @ 14:43)  HR: 91 (11-14-23 @ 14:43) (68 - 91)  BP: 121/72 (11-14-23 @ 14:43) (105/65 - 127/72)  BP(mean): --  RR: 17 (11-14-23 @ 14:43) (17 - 18)  SpO2: 100% (11-14-23 @ 14:43) (98% - 100%)    Orthostatic VS  11-14-23 @ 15:15  Lying BP: --/-- HR: --  Sitting BP: 127/83 HR: 85  Standing BP: 120/83 HR: 94  Site: --  Mode: --

## 2023-11-14 NOTE — PROGRESS NOTE ADULT - PROBLEM SELECTOR PLAN 3
relatively asymptomatic, doubt this is contributing to her SI  - UCx with proteus  - s/p bactrim x3 days to eradicate any possible albeit unlikely contributing factors
-no evidence of acute exacerbation   -on breztri and qvar inhaler. c/w mometasone   -on tezspire injection as outpt  -albuterol prn
-no evidence of acute exacerbation   -on breztri and qvar inhaler. c/w mometasone   -on tezspire injection as outpt  -albuterol prn
relatively asymptomatic, doubt this is contributing to her SI  - UCx with proteus  - s/p bactrim x3 days to eradicate any possible albeit unlikely contributing factors
-no evidence of acute exacerbation   -on breztri and qvar inhaler. c/w mometasone   -on tezspire injection as outpt  -albuterol prn

## 2023-11-14 NOTE — PROGRESS NOTE ADULT - PROBLEM SELECTOR PLAN 5
-h/o thyroid ca s/p resection c/b hypothyroidism. c/w synthroid  -f/u tsh
-h/o thyroid ca s/p resection c/b hypothyroidism. c/w synthroid  -euthyroid , tsh 3.3
-h/o thyroid ca s/p resection c/b hypothyroidism. c/w synthroid  -euthyroid , tsh 3.3
chronic neuropathy however exacerbated by med overdose  - hold duloxetine and lyrica for now as psych meds are being adjusted
chronic neuropathy however exacerbated by med overdose  - hold duloxetine and lyrica for now as psych meds are being adjusted

## 2023-11-14 NOTE — PROGRESS NOTE ADULT - SUBJECTIVE AND OBJECTIVE BOX
Dr. Liliana Salinas  Pager [phone]    PROGRESS NOTE:     Patient is a 33y old  Female who presents with a chief complaint of intentional overdose (11 Nov 2023 13:59)      SUBJECTIVE / OVERNIGHT EVENTS: pt reports nightmare last night, dreamed that her baby (3 year old son) got lost , woke up and called her , got reassured and felt better  ADDITIONAL REVIEW OF SYSTEMS: c/o mild headache, denies SI    MEDICATIONS  (STANDING):  enoxaparin Injectable 40 milliGRAM(s) SubCutaneous every 24 hours  folic acid 1 milliGRAM(s) Oral daily  levothyroxine 175 MICROGram(s) Oral daily  lidocaine   4% Patch 1 Patch Transdermal every 24 hours  mometasone 220 MICROgram(s) Inhaler 2 Puff(s) Inhalation daily  trimethoprim  160 mG/sulfamethoxazole 800 mG 1 Tablet(s) Oral two times a day    MEDICATIONS  (PRN):  acetaminophen     Tablet .. 650 milliGRAM(s) Oral every 6 hours PRN Temp greater or equal to 38C (100.4F), Mild Pain (1 - 3)  albuterol    90 MICROgram(s) HFA Inhaler 2 Puff(s) Inhalation every 6 hours PRN for shortness of breath and/or wheezing  aluminum hydroxide/magnesium hydroxide/simethicone Suspension 30 milliLiter(s) Oral every 4 hours PRN Dyspepsia  melatonin 3 milliGRAM(s) Oral at bedtime PRN Insomnia  ondansetron Injectable 4 milliGRAM(s) IV Push every 8 hours PRN Nausea and/or Vomiting  QUEtiapine 25 milliGRAM(s) Oral every 6 hours PRN agitation      CAPILLARY BLOOD GLUCOSE        I&O's Summary      PHYSICAL EXAM:  Vital Signs Last 24 Hrs  T(C): 36.8 (12 Nov 2023 05:09), Max: 36.9 (11 Nov 2023 22:30)  T(F): 98.3 (12 Nov 2023 05:09), Max: 98.4 (11 Nov 2023 22:30)  HR: 69 (12 Nov 2023 05:09) (69 - 81)  BP: 108/64 (12 Nov 2023 05:09) (108/64 - 116/73)  BP(mean): --  RR: 18 (12 Nov 2023 05:09) (18 - 18)  SpO2: 98% (12 Nov 2023 05:09) (98% - 100%)    Parameters below as of 12 Nov 2023 05:09  Patient On (Oxygen Delivery Method): room air      CONSTITUTIONAL: NAD, well-developed  RESPIRATORY: Normal respiratory effort; lungs are clear to auscultation bilaterally  CARDIOVASCULAR: Regular rate and rhythm, normal S1 and S2, no murmur/rub/gallop; No lower extremity edema; Peripheral pulses are 2+ bilaterally  ABDOMEN: Nontender to palpation, normoactive bowel sounds, no rebound/guarding; No hepatosplenomegaly  MUSCULOSKELETAL: no clubbing or cyanosis of digits; no joint swelling or tenderness to palpation  PSYCH: A+O to person, place, and time; affect appropriate    LABS:                        13.1   7.18  )-----------( 161      ( 11 Nov 2023 05:47 )             40.1     11-11    137  |  104  |  13  ----------------------------<  85  3.7   |  23  |  0.60    Ca    8.6      11 Nov 2023 05:47            Urinalysis Basic - ( 11 Nov 2023 05:47 )    Color: x / Appearance: x / SG: x / pH: x  Gluc: 85 mg/dL / Ketone: x  / Bili: x / Urobili: x   Blood: x / Protein: x / Nitrite: x   Leuk Esterase: x / RBC: x / WBC x   Sq Epi: x / Non Sq Epi: x / Bacteria: x          RADIOLOGY & ADDITIONAL TESTS:  Results Reviewed:   Imaging Personally Reviewed:  Electrocardiogram Personally Reviewed:    COORDINATION OF CARE:  Care Discussed with Consultants/Other Providers [Y/N]:  Prior or Outpatient Records Reviewed [Y/N]:  
Dr. Liliana Salinas  Pager [phone]    PROGRESS NOTE:     Patient is a 33y old  Female who presents with a chief complaint of intentional overdose (10 Nov 2023 15:52)       SUBJECTIVE / OVERNIGHT EVENTS: denies chest pain or sob   ADDITIONAL REVIEW OF SYSTEMS: afebrile , denies SI, auditory or visual hallucination, c/o intermittent  pains in her leg and foot    MEDICATIONS  (STANDING):  doxazosin 1 milliGRAM(s) Oral at bedtime  enoxaparin Injectable 40 milliGRAM(s) SubCutaneous every 24 hours  folic acid 1 milliGRAM(s) Oral daily  levothyroxine 175 MICROGram(s) Oral daily  lidocaine   4% Patch 1 Patch Transdermal every 24 hours  mometasone 220 MICROgram(s) Inhaler 2 Puff(s) Inhalation daily  trimethoprim  160 mG/sulfamethoxazole 800 mG 1 Tablet(s) Oral two times a day    MEDICATIONS  (PRN):  acetaminophen     Tablet .. 650 milliGRAM(s) Oral every 6 hours PRN Temp greater or equal to 38C (100.4F), Mild Pain (1 - 3)  albuterol    90 MICROgram(s) HFA Inhaler 2 Puff(s) Inhalation every 6 hours PRN for shortness of breath and/or wheezing  aluminum hydroxide/magnesium hydroxide/simethicone Suspension 30 milliLiter(s) Oral every 4 hours PRN Dyspepsia  melatonin 3 milliGRAM(s) Oral at bedtime PRN Insomnia  ondansetron Injectable 4 milliGRAM(s) IV Push every 8 hours PRN Nausea and/or Vomiting  QUEtiapine 25 milliGRAM(s) Oral every 6 hours PRN agitation      CAPILLARY BLOOD GLUCOSE        I&O's Summary      PHYSICAL EXAM:  Vital Signs Last 24 Hrs  T(C): 36.7 (11 Nov 2023 05:30), Max: 36.7 (11 Nov 2023 05:30)  T(F): 98.1 (11 Nov 2023 05:30), Max: 98.1 (11 Nov 2023 05:30)  HR: 58 (11 Nov 2023 05:30) (58 - 83)  BP: 108/61 (11 Nov 2023 05:30) (107/68 - 121/75)  BP(mean): --  RR: 18 (11 Nov 2023 05:30) (18 - 18)  SpO2: 98% (11 Nov 2023 05:30) (98% - 99%)    Parameters below as of 11 Nov 2023 05:30  Patient On (Oxygen Delivery Method): room air      CONSTITUTIONAL: NAD, well-developed  RESPIRATORY: Normal respiratory effort; lungs are clear to auscultation bilaterally  CARDIOVASCULAR: Regular rate and rhythm, normal S1 and S2, no murmur/rub/gallop; No lower extremity edema; Peripheral pulses are 2+ bilaterally  ABDOMEN: Nontender to palpation, normoactive bowel sounds, no rebound/guarding; No hepatosplenomegaly  MUSCULOSKELETAL: no clubbing or cyanosis of digits; no joint swelling or tenderness to palpation  PSYCH: A+O to person, place, and time; affect appropriate    LABS:                        13.1   7.18  )-----------( 161      ( 11 Nov 2023 05:47 )             40.1     11-11    137  |  104  |  13  ----------------------------<  85  3.7   |  23  |  0.60    Ca    8.6      11 Nov 2023 05:47  Phos  3.4     11-10  Mg     2.10     11-10    TPro  7.5  /  Alb  4.4  /  TBili  0.4  /  DBili  x   /  AST  18  /  ALT  17  /  AlkPhos  76  11-09          Urinalysis Basic - ( 11 Nov 2023 05:47 )    Color: x / Appearance: x / SG: x / pH: x  Gluc: 85 mg/dL / Ketone: x  / Bili: x / Urobili: x   Blood: x / Protein: x / Nitrite: x   Leuk Esterase: x / RBC: x / WBC x   Sq Epi: x / Non Sq Epi: x / Bacteria: x          RADIOLOGY & ADDITIONAL TESTS:  Results Reviewed:   Imaging Personally Reviewed:  < from: CT Head No Cont (11.10.23 @ 17:14) >    IMPRESSION:  No acute intracranial hemorrhage,  mass effect, edema or midline shift.      Electrocardiogram Personally Reviewed:    COORDINATION OF CARE:  Care Discussed with Consultants/Other Providers [Y/N]:  Prior or Outpatient Records Reviewed [Y/N]:  
Ailyn Pate MD  [facility] Division of Hospital Medicine  Pager [phone] (M-F 8AM-5PM)  Other Times: [phone]    Patient is a 33y old  Female who presents with a chief complaint of intentional overdose (13 Nov 2023 13:19)    SUBJECTIVE / OVERNIGHT EVENTS: no acute events overnight     MEDICATIONS  (STANDING):  enoxaparin Injectable 40 milliGRAM(s) SubCutaneous every 24 hours  folic acid 1 milliGRAM(s) Oral daily  levothyroxine 175 MICROGram(s) Oral daily  lidocaine   4% Patch 1 Patch Transdermal every 24 hours  mometasone 220 MICROgram(s) Inhaler 2 Puff(s) Inhalation daily  prazosin 1 milliGRAM(s) Oral at bedtime    MEDICATIONS  (PRN):  acetaminophen     Tablet .. 650 milliGRAM(s) Oral every 6 hours PRN Temp greater or equal to 38C (100.4F), Mild Pain (1 - 3)  albuterol    90 MICROgram(s) HFA Inhaler 2 Puff(s) Inhalation every 6 hours PRN for shortness of breath and/or wheezing  aluminum hydroxide/magnesium hydroxide/simethicone Suspension 30 milliLiter(s) Oral every 4 hours PRN Dyspepsia  hydrOXYzine hydrochloride 25 milliGRAM(s) Oral every 6 hours PRN Anxiety  melatonin 3 milliGRAM(s) Oral at bedtime PRN Insomnia  ondansetron Injectable 4 milliGRAM(s) IV Push every 8 hours PRN Nausea and/or Vomiting  QUEtiapine 25 milliGRAM(s) Oral every 6 hours PRN agitation      PHYSICAL EXAM:  Vital Signs Last 24 Hrs  T(C): 36.7 (14 Nov 2023 05:31), Max: 36.8 (13 Nov 2023 12:30)  T(F): 98.1 (14 Nov 2023 05:31), Max: 98.2 (13 Nov 2023 12:30)  HR: 78 (14 Nov 2023 05:31) (68 - 83)  BP: 113/75 (14 Nov 2023 05:31) (105/65 - 125/82)  BP(mean): --  RR: 18 (14 Nov 2023 05:31) (17 - 18)  SpO2: 100% (14 Nov 2023 05:31) (95% - 100%)    Parameters below as of 14 Nov 2023 05:31  Patient On (Oxygen Delivery Method): room air        CONSTITUTIONAL: NAD, well-developed, well-groomed  RESPIRATORY: Normal respiratory effort; lungs are clear to auscultation bilaterally  CARDIOVASCULAR: Regular rate and rhythm, normal S1 and S2, no murmur/rub/gallop; No lower extremity edema  GASTROINTESTINAL: Nontender to palpation, normoactive bowel sounds, no rebound/guarding; No hepatosplenomegaly  MUSCULOSKELETAL:  no clubbing or cyanosis of digits; no joint swelling or tenderness to palpation  NEUROLOGY: non-focal; no gross sensory deficits   PSYCH: A+O to person, place, and time; affect appropriate  SKIN: No rashes; warm     LABS:                        13.5   6.88  )-----------( 165      ( 13 Nov 2023 07:11 )             41.4     11-13    136  |  103  |  17  ----------------------------<  84  3.8   |  20<L>  |  0.69    Ca    8.9      13 Nov 2023 07:11            Urinalysis Basic - ( 13 Nov 2023 07:11 )    Color: x / Appearance: x / SG: x / pH: x  Gluc: 84 mg/dL / Ketone: x  / Bili: x / Urobili: x   Blood: x / Protein: x / Nitrite: x   Leuk Esterase: x / RBC: x / WBC x   Sq Epi: x / Non Sq Epi: x / Bacteria: x          RADIOLOGY & ADDITIONAL TESTS:  Results Reviewed:   Imaging Personally Reviewed:  Electrocardiogram Personally Reviewed:    COORDINATION OF CARE:  Care Discussed with Consultants/Other Providers [Y/N]:  Prior or Outpatient Records Reviewed [Y/N]:  
Ailyn Ptae MD  [facility] Division of Hospital Medicine  Pager [phone] (M-F 8AM-5PM)  Other Times: [phone]    Patient is a 33y old  Female who presents with a chief complaint of intentional overdose (12 Nov 2023 13:57)    SUBJECTIVE / OVERNIGHT EVENTS: no acute events overnight     MEDICATIONS  (STANDING):  enoxaparin Injectable 40 milliGRAM(s) SubCutaneous every 24 hours  folic acid 1 milliGRAM(s) Oral daily  levothyroxine 175 MICROGram(s) Oral daily  lidocaine   4% Patch 1 Patch Transdermal every 24 hours  mometasone 220 MICROgram(s) Inhaler 2 Puff(s) Inhalation daily  prazosin 1 milliGRAM(s) Oral at bedtime    MEDICATIONS  (PRN):  acetaminophen     Tablet .. 650 milliGRAM(s) Oral every 6 hours PRN Temp greater or equal to 38C (100.4F), Mild Pain (1 - 3)  albuterol    90 MICROgram(s) HFA Inhaler 2 Puff(s) Inhalation every 6 hours PRN for shortness of breath and/or wheezing  aluminum hydroxide/magnesium hydroxide/simethicone Suspension 30 milliLiter(s) Oral every 4 hours PRN Dyspepsia  hydrOXYzine hydrochloride 25 milliGRAM(s) Oral every 6 hours PRN Anxiety  melatonin 3 milliGRAM(s) Oral at bedtime PRN Insomnia  ondansetron Injectable 4 milliGRAM(s) IV Push every 8 hours PRN Nausea and/or Vomiting  QUEtiapine 25 milliGRAM(s) Oral every 6 hours PRN agitation      PHYSICAL EXAM:  Vital Signs Last 24 Hrs  T(C): 36.6 (13 Nov 2023 05:01), Max: 37.8 (12 Nov 2023 20:47)  T(F): 97.8 (13 Nov 2023 05:01), Max: 100 (12 Nov 2023 20:47)  HR: 90 (13 Nov 2023 05:01) (81 - 90)  BP: 113/71 (13 Nov 2023 05:01) (113/71 - 156/56)  BP(mean): 82 (13 Nov 2023 05:01) (82 - 82)  RR: 18 (13 Nov 2023 05:01) (18 - 19)  SpO2: 99% (13 Nov 2023 05:01) (99% - 100%)    Parameters below as of 13 Nov 2023 05:01  Patient On (Oxygen Delivery Method): room air    CONSTITUTIONAL: NAD, well-developed, well-groomed  RESPIRATORY: Normal respiratory effort; lungs are clear to auscultation bilaterally  CARDIOVASCULAR: Regular rate and rhythm, normal S1 and S2, no murmur/rub/gallop; No lower extremity edema  GASTROINTESTINAL: Nontender to palpation, normoactive bowel sounds, no rebound/guarding; No hepatosplenomegaly  MUSCULOSKELETAL:  no clubbing or cyanosis of digits; no joint swelling or tenderness to palpation  NEUROLOGY: non-focal; no gross sensory deficits   PSYCH: A+O to person, place, and time; affect appropriate  SKIN: No rashes; warm     LABS:                        13.5   6.88  )-----------( 165      ( 13 Nov 2023 07:11 )             41.4     11-13    136  |  103  |  17  ----------------------------<  84  3.8   |  20<L>  |  0.69    Ca    8.9      13 Nov 2023 07:11            Urinalysis Basic - ( 13 Nov 2023 07:11 )    Color: x / Appearance: x / SG: x / pH: x  Gluc: 84 mg/dL / Ketone: x  / Bili: x / Urobili: x   Blood: x / Protein: x / Nitrite: x   Leuk Esterase: x / RBC: x / WBC x   Sq Epi: x / Non Sq Epi: x / Bacteria: x          RADIOLOGY & ADDITIONAL TESTS:  Results Reviewed:   Imaging Personally Reviewed:  Electrocardiogram Personally Reviewed:    COORDINATION OF CARE:  Care Discussed with Consultants/Other Providers [Y/N]:  Prior or Outpatient Records Reviewed [Y/N]:  
Dr. Liliana Salinas  Pager [phone]    PROGRESS NOTE:     Patient is a 33y old  Female who presents with a chief complaint of intentional overdose (10 Nov 2023 03:29)      SUBJECTIVE / OVERNIGHT EVENTS: pt reports feeling sad and sometimes hears voice, lives at home with  child  ADDITIONAL REVIEW OF SYSTEMS: currently denies SI, Auditory or visual hallucination     MEDICATIONS  (STANDING):  enoxaparin Injectable 40 milliGRAM(s) SubCutaneous every 24 hours  folic acid 1 milliGRAM(s) Oral daily  levothyroxine 175 MICROGram(s) Oral daily  mometasone 220 MICROgram(s) Inhaler 2 Puff(s) Inhalation daily  trimethoprim  160 mG/sulfamethoxazole 800 mG 1 Tablet(s) Oral two times a day    MEDICATIONS  (PRN):  acetaminophen     Tablet .. 650 milliGRAM(s) Oral every 6 hours PRN Temp greater or equal to 38C (100.4F), Mild Pain (1 - 3)  albuterol    90 MICROgram(s) HFA Inhaler 2 Puff(s) Inhalation every 6 hours PRN for shortness of breath and/or wheezing  aluminum hydroxide/magnesium hydroxide/simethicone Suspension 30 milliLiter(s) Oral every 4 hours PRN Dyspepsia  melatonin 3 milliGRAM(s) Oral at bedtime PRN Insomnia  ondansetron Injectable 4 milliGRAM(s) IV Push every 8 hours PRN Nausea and/or Vomiting      CAPILLARY BLOOD GLUCOSE        I&O's Summary      PHYSICAL EXAM:  Vital Signs Last 24 Hrs  T(C): 36.8 (10 Nov 2023 12:55), Max: 37 (2023 18:46)  T(F): 98.3 (10 Nov 2023 12:55), Max: 98.6 (2023 18:46)  HR: 84 (10 Nov 2023 12:55) (66 - 86)  BP: 124/86 (10 Nov 2023 12:55) (105/61 - 147/94)  BP(mean): --  RR: 17 (10 Nov 2023 12:55) (14 - 17)  SpO2: 99% (10 Nov 2023 12:55) (98% - 100%)    Parameters below as of 10 Nov 2023 12:55  Patient On (Oxygen Delivery Method): room air      CONSTITUTIONAL: NAD, well-developed  RESPIRATORY: Normal respiratory effort; lungs are clear to auscultation bilaterally  CARDIOVASCULAR: Regular rate and rhythm, normal S1 and S2, no murmur/rub/gallop; No lower extremity edema; Peripheral pulses are 2+ bilaterally  ABDOMEN: Nontender to palpation, normoactive bowel sounds, no rebound/guarding; No hepatosplenomegaly  MUSCULOSKELETAL: no clubbing or cyanosis of digits; no joint swelling or tenderness to palpation  PSYCH: A+O to person, place, and time; affect depressed and constricted    LABS:                        12.9   8.44  )-----------( 165      ( 10 Nov 2023 06:05 )             39.1     11-10    136  |  104  |  14  ----------------------------<  103<H>  3.9   |  23  |  0.55    Ca    8.5      10 Nov 2023 06:05  Phos  3.4     11-10  Mg     2.10     11-10    TPro  7.5  /  Alb  4.4  /  TBili  0.4  /  DBili  x   /  AST  18  /  ALT  17  /  AlkPhos  76  11-09          Urinalysis Basic - ( 10 Nov 2023 06:07 )    Color: Yellow / Appearance: Turbid / S.018 / pH: x  Gluc: x / Ketone: 40 mg/dL  / Bili: Negative / Urobili: 0.2 mg/dL   Blood: x / Protein: Trace mg/dL / Nitrite: Positive   Leuk Esterase: Large / RBC: 1 /HPF / WBC 52 /HPF   Sq Epi: x / Non Sq Epi: 2 /HPF / Bacteria: Many /HPF          RADIOLOGY & ADDITIONAL TESTS:  Results Reviewed:   Imaging Personally Reviewed:    Electrocardiogram Personally Reviewed:    COORDINATION OF CARE:  Care Discussed with Consultants/Other Providers [Y/N]: Psychiatry team, consult for suicidal ideation/attempt  Prior or Outpatient Records Reviewed [Y/N]:

## 2023-11-14 NOTE — BH CONSULTATION LIAISON PROGRESS NOTE - NSBHFUPINTERVALCCFT_PSY_A_CORE
"I feel better than other days"  Interview conducted in pts preferred language (Sammarinese) with  ID [id number].
"I was very anxious yesterday"
"My head hurts"  Interview conducted in patient's preferred language (Ghanaian) with  ID [id number]. 
"I am feeling better." F/U of depression s/p SA via OD

## 2023-11-14 NOTE — BH INPATIENT PSYCHIATRY ASSESSMENT NOTE - NSBHASSESSSUMMFT_PSY_ALL_CORE
Patient is a 32 y/o  woman, she is  with a 4y/o, currently domiciled with her , unemployed and supported by her family, patient is Mohawk speaker; that was admitted initially to the medical floor after she tried to commit suicide by OD on Wellbutrin. Patient has been medically clear and later on transfer to the unit 2W.     Plan:  1. Admit to the unit 2W on a voluntary status   2. Q 15 min checks   3. Medications      Start Zoloft 50mg daily tomorrow      Start Gabapentin 100mg TID     Continue Prazosin 1 mg at bedtime   4. Medical team to evaluate the patient as needed.  5. SW to coordinate a safe discharge plan.

## 2023-11-15 DIAGNOSIS — Z87.39 PERSONAL HISTORY OF OTHER DISEASES OF THE MUSCULOSKELETAL SYSTEM AND CONNECTIVE TISSUE: ICD-10-CM

## 2023-11-15 DIAGNOSIS — J45.909 UNSPECIFIED ASTHMA, UNCOMPLICATED: ICD-10-CM

## 2023-11-15 DIAGNOSIS — C73 MALIGNANT NEOPLASM OF THYROID GLAND: ICD-10-CM

## 2023-11-15 LAB
A1C WITH ESTIMATED AVERAGE GLUCOSE RESULT: 5.1 % — SIGNIFICANT CHANGE UP (ref 4–5.6)
A1C WITH ESTIMATED AVERAGE GLUCOSE RESULT: 5.1 % — SIGNIFICANT CHANGE UP (ref 4–5.6)
ALBUMIN SERPL ELPH-MCNC: 4.1 G/DL — SIGNIFICANT CHANGE UP (ref 3.3–5)
ALBUMIN SERPL ELPH-MCNC: 4.1 G/DL — SIGNIFICANT CHANGE UP (ref 3.3–5)
ALP SERPL-CCNC: 75 U/L — SIGNIFICANT CHANGE UP (ref 40–120)
ALP SERPL-CCNC: 75 U/L — SIGNIFICANT CHANGE UP (ref 40–120)
ALT FLD-CCNC: 26 U/L — SIGNIFICANT CHANGE UP (ref 4–33)
ALT FLD-CCNC: 26 U/L — SIGNIFICANT CHANGE UP (ref 4–33)
ANION GAP SERPL CALC-SCNC: 12 MMOL/L — SIGNIFICANT CHANGE UP (ref 7–14)
ANION GAP SERPL CALC-SCNC: 12 MMOL/L — SIGNIFICANT CHANGE UP (ref 7–14)
AST SERPL-CCNC: 24 U/L — SIGNIFICANT CHANGE UP (ref 4–32)
AST SERPL-CCNC: 24 U/L — SIGNIFICANT CHANGE UP (ref 4–32)
BASOPHILS # BLD AUTO: 0.03 K/UL — SIGNIFICANT CHANGE UP (ref 0–0.2)
BASOPHILS # BLD AUTO: 0.03 K/UL — SIGNIFICANT CHANGE UP (ref 0–0.2)
BASOPHILS NFR BLD AUTO: 0.5 % — SIGNIFICANT CHANGE UP (ref 0–2)
BASOPHILS NFR BLD AUTO: 0.5 % — SIGNIFICANT CHANGE UP (ref 0–2)
BILIRUB SERPL-MCNC: 0.4 MG/DL — SIGNIFICANT CHANGE UP (ref 0.2–1.2)
BILIRUB SERPL-MCNC: 0.4 MG/DL — SIGNIFICANT CHANGE UP (ref 0.2–1.2)
BUN SERPL-MCNC: 15 MG/DL — SIGNIFICANT CHANGE UP (ref 7–23)
BUN SERPL-MCNC: 15 MG/DL — SIGNIFICANT CHANGE UP (ref 7–23)
CALCIUM SERPL-MCNC: 9 MG/DL — SIGNIFICANT CHANGE UP (ref 8.4–10.5)
CALCIUM SERPL-MCNC: 9 MG/DL — SIGNIFICANT CHANGE UP (ref 8.4–10.5)
CHLORIDE SERPL-SCNC: 102 MMOL/L — SIGNIFICANT CHANGE UP (ref 98–107)
CHLORIDE SERPL-SCNC: 102 MMOL/L — SIGNIFICANT CHANGE UP (ref 98–107)
CHOLEST SERPL-MCNC: 201 MG/DL — HIGH
CHOLEST SERPL-MCNC: 201 MG/DL — HIGH
CO2 SERPL-SCNC: 26 MMOL/L — SIGNIFICANT CHANGE UP (ref 22–31)
CO2 SERPL-SCNC: 26 MMOL/L — SIGNIFICANT CHANGE UP (ref 22–31)
COVID-19 SPIKE DOMAIN AB INTERP: POSITIVE
COVID-19 SPIKE DOMAIN AB INTERP: POSITIVE
COVID-19 SPIKE DOMAIN ANTIBODY RESULT: >250 U/ML — HIGH
COVID-19 SPIKE DOMAIN ANTIBODY RESULT: >250 U/ML — HIGH
CREAT SERPL-MCNC: 0.56 MG/DL — SIGNIFICANT CHANGE UP (ref 0.5–1.3)
CREAT SERPL-MCNC: 0.56 MG/DL — SIGNIFICANT CHANGE UP (ref 0.5–1.3)
EGFR: 125 ML/MIN/1.73M2 — SIGNIFICANT CHANGE UP
EGFR: 125 ML/MIN/1.73M2 — SIGNIFICANT CHANGE UP
EOSINOPHIL # BLD AUTO: 0.18 K/UL — SIGNIFICANT CHANGE UP (ref 0–0.5)
EOSINOPHIL # BLD AUTO: 0.18 K/UL — SIGNIFICANT CHANGE UP (ref 0–0.5)
EOSINOPHIL NFR BLD AUTO: 2.7 % — SIGNIFICANT CHANGE UP (ref 0–6)
EOSINOPHIL NFR BLD AUTO: 2.7 % — SIGNIFICANT CHANGE UP (ref 0–6)
ESTIMATED AVERAGE GLUCOSE: 100 — SIGNIFICANT CHANGE UP
ESTIMATED AVERAGE GLUCOSE: 100 — SIGNIFICANT CHANGE UP
GLUCOSE SERPL-MCNC: 99 MG/DL — SIGNIFICANT CHANGE UP (ref 70–99)
GLUCOSE SERPL-MCNC: 99 MG/DL — SIGNIFICANT CHANGE UP (ref 70–99)
HCG SERPL-ACNC: <1 MIU/ML — SIGNIFICANT CHANGE UP
HCG SERPL-ACNC: <1 MIU/ML — SIGNIFICANT CHANGE UP
HCT VFR BLD CALC: 40.4 % — SIGNIFICANT CHANGE UP (ref 34.5–45)
HCT VFR BLD CALC: 40.4 % — SIGNIFICANT CHANGE UP (ref 34.5–45)
HDLC SERPL-MCNC: 47 MG/DL — LOW
HDLC SERPL-MCNC: 47 MG/DL — LOW
HGB BLD-MCNC: 13.4 G/DL — SIGNIFICANT CHANGE UP (ref 11.5–15.5)
HGB BLD-MCNC: 13.4 G/DL — SIGNIFICANT CHANGE UP (ref 11.5–15.5)
IANC: 3.72 K/UL — SIGNIFICANT CHANGE UP (ref 1.8–7.4)
IANC: 3.72 K/UL — SIGNIFICANT CHANGE UP (ref 1.8–7.4)
IMM GRANULOCYTES NFR BLD AUTO: 0.6 % — SIGNIFICANT CHANGE UP (ref 0–0.9)
IMM GRANULOCYTES NFR BLD AUTO: 0.6 % — SIGNIFICANT CHANGE UP (ref 0–0.9)
LIPID PNL WITH DIRECT LDL SERPL: 121 MG/DL — HIGH
LIPID PNL WITH DIRECT LDL SERPL: 121 MG/DL — HIGH
LYMPHOCYTES # BLD AUTO: 2.05 K/UL — SIGNIFICANT CHANGE UP (ref 1–3.3)
LYMPHOCYTES # BLD AUTO: 2.05 K/UL — SIGNIFICANT CHANGE UP (ref 1–3.3)
LYMPHOCYTES # BLD AUTO: 31.3 % — SIGNIFICANT CHANGE UP (ref 13–44)
LYMPHOCYTES # BLD AUTO: 31.3 % — SIGNIFICANT CHANGE UP (ref 13–44)
MCHC RBC-ENTMCNC: 29.3 PG — SIGNIFICANT CHANGE UP (ref 27–34)
MCHC RBC-ENTMCNC: 29.3 PG — SIGNIFICANT CHANGE UP (ref 27–34)
MCHC RBC-ENTMCNC: 33.2 GM/DL — SIGNIFICANT CHANGE UP (ref 32–36)
MCHC RBC-ENTMCNC: 33.2 GM/DL — SIGNIFICANT CHANGE UP (ref 32–36)
MCV RBC AUTO: 88.2 FL — SIGNIFICANT CHANGE UP (ref 80–100)
MCV RBC AUTO: 88.2 FL — SIGNIFICANT CHANGE UP (ref 80–100)
MONOCYTES # BLD AUTO: 0.54 K/UL — SIGNIFICANT CHANGE UP (ref 0–0.9)
MONOCYTES # BLD AUTO: 0.54 K/UL — SIGNIFICANT CHANGE UP (ref 0–0.9)
MONOCYTES NFR BLD AUTO: 8.2 % — SIGNIFICANT CHANGE UP (ref 2–14)
MONOCYTES NFR BLD AUTO: 8.2 % — SIGNIFICANT CHANGE UP (ref 2–14)
NEUTROPHILS # BLD AUTO: 3.72 K/UL — SIGNIFICANT CHANGE UP (ref 1.8–7.4)
NEUTROPHILS # BLD AUTO: 3.72 K/UL — SIGNIFICANT CHANGE UP (ref 1.8–7.4)
NEUTROPHILS NFR BLD AUTO: 56.7 % — SIGNIFICANT CHANGE UP (ref 43–77)
NEUTROPHILS NFR BLD AUTO: 56.7 % — SIGNIFICANT CHANGE UP (ref 43–77)
NON HDL CHOLESTEROL: 154 MG/DL — HIGH
NON HDL CHOLESTEROL: 154 MG/DL — HIGH
NRBC # BLD: 0 /100 WBCS — SIGNIFICANT CHANGE UP (ref 0–0)
NRBC # BLD: 0 /100 WBCS — SIGNIFICANT CHANGE UP (ref 0–0)
NRBC # FLD: 0 K/UL — SIGNIFICANT CHANGE UP (ref 0–0)
NRBC # FLD: 0 K/UL — SIGNIFICANT CHANGE UP (ref 0–0)
PLATELET # BLD AUTO: 188 K/UL — SIGNIFICANT CHANGE UP (ref 150–400)
PLATELET # BLD AUTO: 188 K/UL — SIGNIFICANT CHANGE UP (ref 150–400)
POTASSIUM SERPL-MCNC: 3.8 MMOL/L — SIGNIFICANT CHANGE UP (ref 3.5–5.3)
POTASSIUM SERPL-MCNC: 3.8 MMOL/L — SIGNIFICANT CHANGE UP (ref 3.5–5.3)
POTASSIUM SERPL-SCNC: 3.8 MMOL/L — SIGNIFICANT CHANGE UP (ref 3.5–5.3)
POTASSIUM SERPL-SCNC: 3.8 MMOL/L — SIGNIFICANT CHANGE UP (ref 3.5–5.3)
PROT SERPL-MCNC: 7.4 G/DL — SIGNIFICANT CHANGE UP (ref 6–8.3)
PROT SERPL-MCNC: 7.4 G/DL — SIGNIFICANT CHANGE UP (ref 6–8.3)
RBC # BLD: 4.58 M/UL — SIGNIFICANT CHANGE UP (ref 3.8–5.2)
RBC # BLD: 4.58 M/UL — SIGNIFICANT CHANGE UP (ref 3.8–5.2)
RBC # FLD: 13.6 % — SIGNIFICANT CHANGE UP (ref 10.3–14.5)
RBC # FLD: 13.6 % — SIGNIFICANT CHANGE UP (ref 10.3–14.5)
SARS-COV-2 IGG+IGM SERPL QL IA: >250 U/ML — HIGH
SARS-COV-2 IGG+IGM SERPL QL IA: >250 U/ML — HIGH
SARS-COV-2 IGG+IGM SERPL QL IA: POSITIVE
SARS-COV-2 IGG+IGM SERPL QL IA: POSITIVE
SODIUM SERPL-SCNC: 140 MMOL/L — SIGNIFICANT CHANGE UP (ref 135–145)
SODIUM SERPL-SCNC: 140 MMOL/L — SIGNIFICANT CHANGE UP (ref 135–145)
TRIGL SERPL-MCNC: 165 MG/DL — HIGH
TRIGL SERPL-MCNC: 165 MG/DL — HIGH
TSH SERPL-MCNC: 2.85 UIU/ML — SIGNIFICANT CHANGE UP (ref 0.27–4.2)
TSH SERPL-MCNC: 2.85 UIU/ML — SIGNIFICANT CHANGE UP (ref 0.27–4.2)
WBC # BLD: 6.56 K/UL — SIGNIFICANT CHANGE UP (ref 3.8–10.5)
WBC # BLD: 6.56 K/UL — SIGNIFICANT CHANGE UP (ref 3.8–10.5)
WBC # FLD AUTO: 6.56 K/UL — SIGNIFICANT CHANGE UP (ref 3.8–10.5)
WBC # FLD AUTO: 6.56 K/UL — SIGNIFICANT CHANGE UP (ref 3.8–10.5)

## 2023-11-15 PROCEDURE — 93010 ELECTROCARDIOGRAM REPORT: CPT

## 2023-11-15 PROCEDURE — 99223 1ST HOSP IP/OBS HIGH 75: CPT

## 2023-11-15 PROCEDURE — 99233 SBSQ HOSP IP/OBS HIGH 50: CPT

## 2023-11-15 RX ORDER — DULOXETINE HYDROCHLORIDE 30 MG/1
20 CAPSULE, DELAYED RELEASE ORAL DAILY
Refills: 0 | Status: DISCONTINUED | OUTPATIENT
Start: 2023-11-15 | End: 2023-11-17

## 2023-11-15 RX ORDER — ALBUTEROL 90 UG/1
2 AEROSOL, METERED ORAL EVERY 6 HOURS
Refills: 0 | Status: DISCONTINUED | OUTPATIENT
Start: 2023-11-15 | End: 2023-12-13

## 2023-11-15 RX ORDER — TOPIRAMATE 25 MG
25 TABLET ORAL DAILY
Refills: 0 | Status: DISCONTINUED | OUTPATIENT
Start: 2023-11-15 | End: 2023-11-17

## 2023-11-15 RX ORDER — IBUPROFEN 200 MG
600 TABLET ORAL EVERY 8 HOURS
Refills: 0 | Status: DISCONTINUED | OUTPATIENT
Start: 2023-11-15 | End: 2023-12-13

## 2023-11-15 RX ORDER — NALTREXONE HYDROCHLORIDE 50 MG/1
50 TABLET, FILM COATED ORAL DAILY
Refills: 0 | Status: DISCONTINUED | OUTPATIENT
Start: 2023-11-15 | End: 2023-12-13

## 2023-11-15 RX ORDER — LANOLIN ALCOHOL/MO/W.PET/CERES
3 CREAM (GRAM) TOPICAL AT BEDTIME
Refills: 0 | Status: DISCONTINUED | OUTPATIENT
Start: 2023-11-15 | End: 2023-12-13

## 2023-11-15 RX ADMIN — Medication 1 MILLIGRAM(S): at 20:31

## 2023-11-15 RX ADMIN — Medication 25 MILLIGRAM(S): at 14:37

## 2023-11-15 RX ADMIN — Medication 1 MILLIGRAM(S): at 09:14

## 2023-11-15 RX ADMIN — Medication 3 MILLIGRAM(S): at 20:32

## 2023-11-15 RX ADMIN — SERTRALINE 50 MILLIGRAM(S): 25 TABLET, FILM COATED ORAL at 09:14

## 2023-11-15 RX ADMIN — Medication 650 MILLIGRAM(S): at 00:19

## 2023-11-15 RX ADMIN — Medication 650 MILLIGRAM(S): at 09:14

## 2023-11-15 RX ADMIN — ALBUTEROL 2 PUFF(S): 90 AEROSOL, METERED ORAL at 13:05

## 2023-11-15 RX ADMIN — MOMETASONE FUROATE 2 PUFF(S): 220 INHALANT RESPIRATORY (INHALATION) at 09:20

## 2023-11-15 RX ADMIN — Medication 600 MILLIGRAM(S): at 14:36

## 2023-11-15 RX ADMIN — Medication 175 MICROGRAM(S): at 06:00

## 2023-11-15 RX ADMIN — Medication 650 MILLIGRAM(S): at 01:20

## 2023-11-15 NOTE — CONSULT NOTE PEDS - ASSESSMENT
33 year old female with PMH significant for depression, fibromyalgia, thyroid cancer s/p resection, and asthma presenting to the ED after a suicide attempt via ingestion of buproprion. Patient was seen by toxicology - monitored, no cardiac abnormalities notes. Psychiatry saw patient and medications were adjusted. She was deemed stable for discharge to [facility]. Medicine was consulted for management of medical comorbidities.  33 year old female with PMH significant for depression, fibromyalgia, thyroid cancer s/p resection, and asthma presenting to the ED after a suicide attempt via ingestion of buproprion. Patient was seen by toxicology - monitored, no cardiac abnormalities notes. Psychiatry saw patient and medications were adjusted. She was deemed stable for discharge to [facility]. Medicine was consulted for management of medical comorbidities.       #Suicide attempt:   -Plan: pt admits to taking 4 buproprion tabs, intentional overdose  -toxicology evaluated : EKG without QRS or QTC prolongation, monitored on telemetry without evidence of arrhythmias. No seizure activity.   - appreciate BH: recommended holding wellbutrin and cymbalta    #Urinary tract infection:   -relatively asymptomatic, doubt this is contributing to her SI  - UCx with proteus  - s/p bactrim x3 days to eradicate any possible albeit unlikely contributing factors.    #Asthma:   -no evidence of acute exacerbation    on breztri and qvar inhaler, c/w mometasone   -on tezspire injection as outpt  -albuterol prn    #Fibromyalgia:   -chronic neuropathy however exacerbated by med overdose  -hold duloxetine and lyrica for now as psych meds are being adjusted    #Hypothyroidism:   -hx of thyroid cancer s/p resection  -TSH 3.3, c/w synthroid    #Anxiety and depression:  -management as per psych

## 2023-11-15 NOTE — PSYCHIATRIC REHAB INITIAL EVALUATION - NSBHPRRECOMMEND_PSY_ALL_CORE
made two attempts to meet with Pt to orient her to Psych Rehab department and its functions. On the first attempt Pt was with another treatment team member and on the second attempt Pt as asleep. Therefore, this assessment is completed based on Pt’s chart. Per the chart Pt is a 34 y/o  woman. Pt is  and has a 3 year old son.  Immigrated from [facility] and lives with her . Pt is a Italian speaker and unemployed. Pt was admitted initially to the medical floor after she tried to commit suicide via OD. Pt is medically clear ed and admitted to .  was unable to establish collaborative goal with Pt due to Pt being asleep therefore; a  goal was chosen for the Pt to work on the next seven days. Over the next seven days Psych Rehab staff will provide support, encouragement, individual and group therapy to assist the Pt in progression of her treatment goal.

## 2023-11-15 NOTE — PSYCHIATRIC REHAB INITIAL EVALUATION - NSBHSIGPRIORMED_PSY_ALL_CORE
Per the chart Pt is with PMH significant for depression, fibromyalgia, thyroid cancer s/p resection, and asthma./Yes (Specify)

## 2023-11-15 NOTE — DIETITIAN INITIAL EVALUATION ADULT - NUTRITION DIAGNOSIS
no... Partial Purse String (Intermediate) Text: Given the location of the defect and the characteristics of the surrounding skin an intermediate purse string closure was deemed most appropriate.  Undermining was performed circumfirentially around the surgical defect.  A purse string suture was then placed and tightened. Wound tension only allowed a partial closure of the circular defect.

## 2023-11-15 NOTE — BH INPATIENT PSYCHIATRY PROGRESS NOTE - ATTENDING COMMENTS
Care was discussed and reviewed in interdisciplinary treatment team.  I, Jesi Shea MD, have reviewed and verified the documentation.  I independently performed the documented medical decision making.

## 2023-11-15 NOTE — BH INPATIENT PSYCHIATRY PROGRESS NOTE - NSBHASSESSSUMMFT_PSY_ALL_CORE
Patient is a 34 y/o  woman, she is  with a 2y/o, currently domiciled with her , unemployed and supported by her family, patient is Georgian speaker; that was admitted initially to the medical floor after she tried to commit suicide by OD on Wellbutrin. Patient has been medically clear and later on transfer to the unit 2W.    Today, pt remains sad and depressed, "a little happy" SA was not successful, asked to have her pain meds restarted, asked that gabapentin be changed. Will re-start cymbalta 20 mg PO QD, naltrexone 50 mg PO QD, albuterol 2 puffs inhaled PRN Q 6 hours for SOB/wheezing, melatonin 3 mg PO QHS, topamax 25 mg PO QD, and c/w zoloft 50 mg PO QD and prazosin 1 mg PO QHS    Plan:  1. Admit to the unit 2W on a voluntary status   2. Q 15 min checks   3. Medications      Start Zoloft 50mg daily tomorrow      Start Gabapentin 100mg TID     Continue Prazosin 1 mg at bedtime   4. Medical team to evaluate the patient as needed.  5. SW to coordinate a safe discharge plan.

## 2023-11-15 NOTE — BH INPATIENT PSYCHIATRY PROGRESS NOTE - PRN MEDS
MEDICATIONS  (PRN):  acetaminophen     Tablet .. 650 milliGRAM(s) Oral every 6 hours PRN Mild Pain (1 - 3)  albuterol    90 MICROgram(s) HFA Inhaler 2 Puff(s) Inhalation every 6 hours PRN asthma  hydrOXYzine hydrochloride 25 milliGRAM(s) Oral every 6 hours PRN Anxiety  lidocaine   4% Patch 1 Patch Transdermal daily PRN hip pain  OLANZapine 5 milliGRAM(s) Oral every 6 hours PRN Severe agitation or psychosis  traZODone 50 milliGRAM(s) Oral at bedtime PRN Insomnia   MEDICATIONS  (PRN):  acetaminophen     Tablet .. 650 milliGRAM(s) Oral every 6 hours PRN Mild Pain (1 - 3)  albuterol    90 MICROgram(s) HFA Inhaler 2 Puff(s) Inhalation every 6 hours PRN asthma  hydrOXYzine hydrochloride 25 milliGRAM(s) Oral every 6 hours PRN Anxiety  ibuprofen  Tablet. 600 milliGRAM(s) Oral every 8 hours PRN Moderate Pain (4 - 6)  lidocaine   4% Patch 1 Patch Transdermal daily PRN hip pain  OLANZapine 5 milliGRAM(s) Oral every 6 hours PRN Severe agitation or psychosis  traZODone 50 milliGRAM(s) Oral at bedtime PRN Insomnia

## 2023-11-15 NOTE — BH INPATIENT PSYCHIATRY PROGRESS NOTE - NSBHMETABOLIC_PSY_ALL_CORE_FT
BMI: BMI (kg/m2): 29.1 (11-14-23 @ 15:15)  HbA1c: A1C with Estimated Average Glucose Result: 5.1 % (11-15-23 @ 09:30)    Glucose:   BP: 119/86 (11-14-23 @ 20:48) (119/86 - 119/86)  Lipid Panel: Date/Time: 11-15-23 @ 09:30  Cholesterol, Serum: 201  Direct LDL: --  HDL Cholesterol, Serum: 47  Total Cholesterol/HDL Ration Measurement: --  Triglycerides, Serum: 165

## 2023-11-15 NOTE — DIETITIAN INITIAL EVALUATION ADULT - OTHER INFO
Reviewed H&P.  No changes in medical history.  Consent obtained verbal and written.    
Pt is a 32 y/o Turkish speaking female. Admitted initially to [facility] medical floor after SA via OD on wellbutrin.  Pt was medically cleared and transferred to [facility].  Per MD note, Pt was diagnosed with Thyroid Ca ,now in remission but c/o pain with poor appetite PTA.   Saw Pt in her room. Writer communicated with Pt in Romansh. Reports good appetite/po intake at present. No GI distress noted. NKFA. No recent weight changes.   Food preferences explored and implemented.

## 2023-11-15 NOTE — BH INPATIENT PSYCHIATRY PROGRESS NOTE - CURRENT MEDICATION
MEDICATIONS  (STANDING):  DULoxetine 20 milliGRAM(s) Oral daily  folic acid 1 milliGRAM(s) Oral daily  levothyroxine 175 MICROGram(s) Oral daily  melatonin. 3 milliGRAM(s) Oral at bedtime  mometasone 220 MICROgram(s) Inhaler 2 Puff(s) Inhalation daily  naltrexone 50 milliGRAM(s) Oral daily  prazosin. 1 milliGRAM(s) Oral at bedtime  sertraline 50 milliGRAM(s) Oral daily  topiramate 25 milliGRAM(s) Oral daily    MEDICATIONS  (PRN):  acetaminophen     Tablet .. 650 milliGRAM(s) Oral every 6 hours PRN Mild Pain (1 - 3)  albuterol    90 MICROgram(s) HFA Inhaler 2 Puff(s) Inhalation every 6 hours PRN asthma  hydrOXYzine hydrochloride 25 milliGRAM(s) Oral every 6 hours PRN Anxiety  lidocaine   4% Patch 1 Patch Transdermal daily PRN hip pain  OLANZapine 5 milliGRAM(s) Oral every 6 hours PRN Severe agitation or psychosis  traZODone 50 milliGRAM(s) Oral at bedtime PRN Insomnia   MEDICATIONS  (STANDING):  DULoxetine 20 milliGRAM(s) Oral daily  folic acid 1 milliGRAM(s) Oral daily  levothyroxine 175 MICROGram(s) Oral daily  melatonin. 3 milliGRAM(s) Oral at bedtime  mometasone 220 MICROgram(s) Inhaler 2 Puff(s) Inhalation daily  naltrexone 50 milliGRAM(s) Oral daily  prazosin. 1 milliGRAM(s) Oral at bedtime  sertraline 50 milliGRAM(s) Oral daily  topiramate 25 milliGRAM(s) Oral daily    MEDICATIONS  (PRN):  acetaminophen     Tablet .. 650 milliGRAM(s) Oral every 6 hours PRN Mild Pain (1 - 3)  albuterol    90 MICROgram(s) HFA Inhaler 2 Puff(s) Inhalation every 6 hours PRN asthma  hydrOXYzine hydrochloride 25 milliGRAM(s) Oral every 6 hours PRN Anxiety  ibuprofen  Tablet. 600 milliGRAM(s) Oral every 8 hours PRN Moderate Pain (4 - 6)  lidocaine   4% Patch 1 Patch Transdermal daily PRN hip pain  OLANZapine 5 milliGRAM(s) Oral every 6 hours PRN Severe agitation or psychosis  traZODone 50 milliGRAM(s) Oral at bedtime PRN Insomnia

## 2023-11-15 NOTE — PSYCHIATRIC REHAB INITIAL EVALUATION - NSBHEDULEVEL_PSY_ALL_CORE
Per the chart Pt attended a 4 years of college in [employer] but did not finish degree./High (Secondary) School

## 2023-11-15 NOTE — CONSULT NOTE PEDS - SUBJECTIVE AND OBJECTIVE BOX
HPI:     33 year old female with PMH significant for depression, fibromyalgia, thyroid cancer s/p resection, and asthma presenting to the ED after a suicide attempt. Patient states that at 4pm on day of admission, she overdosed on buproprion in an SI attempt. Patient reports taking 4 tablets of bupropion 150mg. Patient denied ingesting any other substance including prescriptions and OTC medications.  Patient states she has been feeling depressed for a while now and had increasing thoughts of SI. ROS positive for auditory hallucination.  Patient was seen by toxicology - monitored, no cardiac abnormalities notes. Psychiatry saw patient and medications were adjusted. She was deemed stable for discharge to [facility].       PAST MEDICAL & SURGICAL HISTORY:  Asthma      Fibromyalgia      Thyroid cancer      Anxiety and depression      H/O:           Review of Systems:   CONSTITUTIONAL: No fever, weight loss, or fatigue  EYES: No eye pain, visual disturbances, or discharge  ENMT:  No difficulty hearing, tinnitus, vertigo; No sinus or throat pain  NECK: No pain or stiffness  RESPIRATORY: No cough, wheezing, chills or hemoptysis; No shortness of breath  CARDIOVASCULAR: No chest pain, palpitations, dizziness, or leg swelling  GASTROINTESTINAL: No abdominal or epigastric pain. No nausea, vomiting, or hematemesis; No diarrhea or constipation. No melena or hematochezia.  GENITOURINARY: No dysuria, frequency, hematuria, or incontinence  NEUROLOGICAL: No headaches, memory loss, loss of strength, numbness, or tremors  SKIN: No itching, burning, rashes, or lesions   LYMPH NODES: No enlarged glands  ENDOCRINE: No heat or cold intolerance; No hair loss  MUSCULOSKELETAL: No joint pain or swelling; No muscle, back, or extremity pain  HEME/LYMPH: No easy bruising, or bleeding gums  ALLERY AND IMMUNOLOGIC: No hives or eczema    Allergies    penicillin (Short breath; Urticaria)    Intolerances        Social History:   No toxic habits. Lives with  and children.     FAMILY HISTORY:  No pertinent family history in first degree relatives of psychiatric illness.         MEDICATIONS  (STANDING):  folic acid 1 milliGRAM(s) Oral daily  gabapentin 100 milliGRAM(s) Oral three times a day  levothyroxine 175 MICROGram(s) Oral daily  mometasone 220 MICROgram(s) Inhaler 2 Puff(s) Inhalation daily  prazosin. 1 milliGRAM(s) Oral at bedtime  sertraline 50 milliGRAM(s) Oral daily    MEDICATIONS  (PRN):  acetaminophen     Tablet .. 650 milliGRAM(s) Oral every 6 hours PRN Mild Pain (1 - 3)  hydrOXYzine hydrochloride 25 milliGRAM(s) Oral every 6 hours PRN Anxiety  lidocaine   4% Patch 1 Patch Transdermal daily PRN hip pain  OLANZapine 5 milliGRAM(s) Oral every 6 hours PRN Severe agitation or psychosis  traZODone 50 milliGRAM(s) Oral at bedtime PRN Insomnia      Vital Signs Last 24 Hrs  T(C): 36.8 (2023 14:43), Max: 36.8 (2023 14:43)  T(F): 98.2 (2023 14:43), Max: 98.2 (2023 14:43)  HR: 91 (2023 14:43) (89 - 91)  BP: 119/86 (2023 20:48) (119/86 - 127/72)  BP(mean): 62 (2023 20:48) (62 - 62)  RR: 17 (2023 14:43) (17 - 18)  SpO2: 100% (2023 14:43) (100% - 100%)      CAPILLARY BLOOD GLUCOSE            PHYSICAL EXAM:  GENERAL: NAD, well-developed  HEAD:  Atraumatic, Normocephalic  EYES: EOMI, conjunctiva and sclera clear  NECK: Supple, No JVD  CHEST/LUNG: Clear to auscultation bilaterally; No wheeze  HEART: Regular rate and rhythm; No murmurs, rubs, or gallops  ABDOMEN: Soft, Nontender, Nondistended; Bowel sounds present  EXTREMITIES:  2+ Peripheral Pulses, No clubbing, cyanosis, or edema  NEUROLOGY: non-focal  SKIN: No rashes or lesions    LABS:                Urinalysis Basic - ( 2023 23:00 )    Color: Yellow / Appearance: Clear / S.010 / pH: x  Gluc: x / Ketone: Negative mg/dL  / Bili: Negative / Urobili: 0.2 mg/dL   Blood: x / Protein: Negative mg/dL / Nitrite: Negative   Leuk Esterase: Trace / RBC: 0 /HPF / WBC 1 /HPF   Sq Epi: x / Non Sq Epi: 0 /HPF / Bacteria: Negative /HPF        EKG(personally reviewed):    RADIOLOGY & ADDITIONAL TESTS:    Imaging Personally Reviewed:    Consultant(s) Notes Reviewed:      Care Discussed with Consultants/Other Providers:

## 2023-11-15 NOTE — BH INPATIENT PSYCHIATRY PROGRESS NOTE - NSBHCHARTREVIEWVS_PSY_A_CORE FT
Vital Signs Last 24 Hrs  T(C): 36.8 (11-14-23 @ 14:43), Max: 36.8 (11-14-23 @ 14:43)  T(F): 98.2 (11-14-23 @ 14:43), Max: 98.2 (11-14-23 @ 14:43)  HR: 91 (11-14-23 @ 14:43) (91 - 91)  BP: 119/86 (11-14-23 @ 20:48) (119/86 - 121/72)  BP(mean): 62 (11-14-23 @ 20:48) (62 - 62)  RR: 17 (11-14-23 @ 14:43) (17 - 17)  SpO2: 100% (11-14-23 @ 14:43) (100% - 100%)    Orthostatic VS  11-15-23 @ 09:03  Lying BP: --/-- HR: --  Sitting BP: 122/79 HR: 91  Standing BP: 115/79 HR: 103  Site: --  Mode: --  Orthostatic VS  11-14-23 @ 15:15  Lying BP: --/-- HR: --  Sitting BP: 127/83 HR: 85  Standing BP: 120/83 HR: 94  Site: --  Mode: --   Vital Signs Last 24 Hrs  T(C): --  T(F): --  HR: --  BP: 119/86 (11-14-23 @ 20:48) (119/86 - 119/86)  BP(mean): 62 (11-14-23 @ 20:48) (62 - 62)  RR: --  SpO2: --    Orthostatic VS  11-15-23 @ 09:03  Lying BP: --/-- HR: --  Sitting BP: 122/79 HR: 91  Standing BP: 115/79 HR: 103  Site: --  Mode: --  Orthostatic VS  11-14-23 @ 15:15  Lying BP: --/-- HR: --  Sitting BP: 127/83 HR: 85  Standing BP: 120/83 HR: 94  Site: --  Mode: --

## 2023-11-15 NOTE — BH INPATIENT PSYCHIATRY PROGRESS NOTE - NSBHFUPINTERVALHXFT_PSY_A_CORE
Pt assessed for depression s/p SA via OD on wellbutrin. Chart reviewed and case discussed with treatment team. No interval events reported overnight. Pt seen with medical student and Dr. Sun present who also provided Prydein[place] translation for the pt. Pt calm and cooperative on approach, pt reports chronic HA since ED admission and states she refused the gabapentin this morning because she remembered her outpt neurologist gave this to her in the past and it caused more EDDY. Dr. Sun discussed starting topamax instead and pt was agreeable. Pt asked that her naltrexone, cymbalta, albuterol be re-started for pain and asthma. Pt provided with medication education re: re-starting at low does, why these meds were d/c'ed in the medical unit s/p OD, and SE. Pt states she does not know her neurologist name, but they are at [facility] and her  will be able to get the information, including the pharmacy she uses. Pt reports feeling sad and tired. Denies active SIIP, but states she only regrets the SA because she is now hospitalized. Pt asked if she regretted living and pt stated that she is "a little bit happy" to be alive, but does cite her son and family as protective factors. Pt's son is safe with his grandmother and aunt and pt states she is looking forward to seeing him today at 4PM for a baby visit. Will re-start cymbalta 20 mg PO QD, naltrexone 50 mg PO QD, albuterol 2 puffs inhaled PRN Q 6 hours for SOB/wheezing, melatonin 3 mg PO QHS, topamax 25 mg PO QD, and c/w zoloft 50 mg PO QD and prazosin 1 mg PO QHS    PMH: asthma, fibromyalgia, thyroid CA in remission   Allergies: PCN  Substances: denies

## 2023-11-15 NOTE — CONSULT NOTE PEDS - TIME BILLING
Time includes review of chart and prior records, discussion with primary team and nursing staff, as well as documentation.

## 2023-11-15 NOTE — DIETITIAN INITIAL EVALUATION ADULT - PERTINENT MEDS FT
MEDICATIONS  (STANDING):  DULoxetine 20 milliGRAM(s) Oral daily  folic acid 1 milliGRAM(s) Oral daily  levothyroxine 175 MICROGram(s) Oral daily  melatonin. 3 milliGRAM(s) Oral at bedtime  mometasone 220 MICROgram(s) Inhaler 2 Puff(s) Inhalation daily  naltrexone 50 milliGRAM(s) Oral daily  prazosin. 1 milliGRAM(s) Oral at bedtime  sertraline 50 milliGRAM(s) Oral daily  topiramate 25 milliGRAM(s) Oral daily    MEDICATIONS  (PRN):  acetaminophen     Tablet .. 650 milliGRAM(s) Oral every 6 hours PRN Mild Pain (1 - 3)  albuterol    90 MICROgram(s) HFA Inhaler 2 Puff(s) Inhalation every 6 hours PRN asthma  hydrOXYzine hydrochloride 25 milliGRAM(s) Oral every 6 hours PRN Anxiety  ibuprofen  Tablet. 600 milliGRAM(s) Oral every 8 hours PRN Moderate Pain (4 - 6)  lidocaine   4% Patch 1 Patch Transdermal daily PRN hip pain  OLANZapine 5 milliGRAM(s) Oral every 6 hours PRN Severe agitation or psychosis  traZODone 50 milliGRAM(s) Oral at bedtime PRN Insomnia

## 2023-11-16 PROCEDURE — 99232 SBSQ HOSP IP/OBS MODERATE 35: CPT

## 2023-11-16 RX ADMIN — Medication 25 MILLIGRAM(S): at 08:50

## 2023-11-16 RX ADMIN — Medication 175 MICROGRAM(S): at 06:19

## 2023-11-16 RX ADMIN — Medication 25 MILLIGRAM(S): at 18:07

## 2023-11-16 RX ADMIN — Medication 1 MILLIGRAM(S): at 08:50

## 2023-11-16 RX ADMIN — Medication 1 MILLIGRAM(S): at 20:51

## 2023-11-16 RX ADMIN — Medication 3 MILLIGRAM(S): at 20:51

## 2023-11-16 RX ADMIN — DULOXETINE HYDROCHLORIDE 20 MILLIGRAM(S): 30 CAPSULE, DELAYED RELEASE ORAL at 08:50

## 2023-11-16 RX ADMIN — SERTRALINE 50 MILLIGRAM(S): 25 TABLET, FILM COATED ORAL at 08:50

## 2023-11-16 RX ADMIN — LIDOCAINE 1 PATCH: 4 CREAM TOPICAL at 09:14

## 2023-11-16 RX ADMIN — NALTREXONE HYDROCHLORIDE 50 MILLIGRAM(S): 50 TABLET, FILM COATED ORAL at 08:50

## 2023-11-16 NOTE — BH INPATIENT PSYCHIATRY PROGRESS NOTE - NSBHFUPINTERVALHXFT_PSY_A_CORE
Pt assessed for depression s/p SA via OD on wellbutrin. Chart reviewed and case discussed with treatment team. Interview was conducted in Malawian by this writer. Patient reported feeling more tired and sedated today. She is aware that started her on several medications that she was taking prior coming to the hospital. Patient said that at the same time she has not been able to sleep. She was looking forward to participate of the therapy but is very difficult for her as she doesn't speak English. I informed the patient that the  has already started to look in to services for her and we are going to make referrals for [facility]. Patient verbalized good understanding and was in agreement with the plan.
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
## 2023-11-16 NOTE — BH INPATIENT PSYCHIATRY PROGRESS NOTE - PRN MEDS
MEDICATIONS  (PRN):  acetaminophen     Tablet .. 650 milliGRAM(s) Oral every 6 hours PRN Mild Pain (1 - 3)  albuterol    90 MICROgram(s) HFA Inhaler 2 Puff(s) Inhalation every 6 hours PRN asthma  hydrOXYzine hydrochloride 25 milliGRAM(s) Oral every 6 hours PRN Anxiety  ibuprofen  Tablet. 600 milliGRAM(s) Oral every 8 hours PRN Moderate Pain (4 - 6)  lidocaine   4% Patch 1 Patch Transdermal daily PRN hip pain  OLANZapine 5 milliGRAM(s) Oral every 6 hours PRN Severe agitation or psychosis  traZODone 50 milliGRAM(s) Oral at bedtime PRN Insomnia

## 2023-11-16 NOTE — BH INPATIENT PSYCHIATRY PROGRESS NOTE - NSBHASSESSSUMMFT_PSY_ALL_CORE
Patient is a 32 y/o  woman, she is  with a 2y/o, currently domiciled with her , unemployed and supported by her family, patient is Indonesian speaker; that was admitted initially to the medical floor after she tried to commit suicide by OD on Wellbutrin. Patient has been medically clear and later on transfer to the unit 2W.    Today, pt remains sad and depressed, "a little happy" SA was not successful, asked to have her pain meds restarted, asked that gabapentin be changed. Will re-start cymbalta 20 mg PO QD, naltrexone 50 mg PO QD, albuterol 2 puffs inhaled PRN Q 6 hours for SOB/wheezing, melatonin 3 mg PO QHS, topamax 25 mg PO QD, and c/w zoloft 50 mg PO QD and prazosin 1 mg PO QHS    Plan:  1. Admit to the unit 2W on a voluntary status   2. Q 15 min checks   3. Medications   DULoxetine 20 milliGRAM(s) Oral daily  folic acid 1 milliGRAM(s) Oral daily  levothyroxine 175 MICROGram(s) Oral daily  melatonin. 3 milliGRAM(s) Oral at bedtime  mometasone 220 MICROgram(s) Inhaler 2 Puff(s) Inhalation daily  naltrexone 50 milliGRAM(s) Oral daily  prazosin. 1 milliGRAM(s) Oral at bedtime  sertraline 50 milliGRAM(s) Oral daily  topiramate 25 milliGRAM(s) Oral daily  4. Medical team to evaluate the patient as needed.  5. SW to coordinate a safe discharge plan.

## 2023-11-16 NOTE — BH INPATIENT PSYCHIATRY PROGRESS NOTE - NSBHCHARTREVIEWVS_PSY_A_CORE FT
Vital Signs Last 24 Hrs  T(C): 36.6 (11-16-23 @ 07:46), Max: 36.6 (11-16-23 @ 07:46)  T(F): 97.9 (11-16-23 @ 07:46), Max: 97.9 (11-16-23 @ 07:46)  HR: --  BP: --  BP(mean): --  RR: 18 (11-16-23 @ 07:46) (18 - 18)  SpO2: 99% (11-16-23 @ 07:46) (99% - 99%)    Orthostatic VS  11-16-23 @ 07:46  Lying BP: --/-- HR: --  Sitting BP: 131/83 HR: 84  Standing BP: 143/79 HR: 96  Site: --  Mode: --  Orthostatic VS  11-15-23 @ 09:03  Lying BP: --/-- HR: --  Sitting BP: 122/79 HR: 91  Standing BP: 115/79 HR: 103  Site: --  Mode: --

## 2023-11-17 PROCEDURE — 99232 SBSQ HOSP IP/OBS MODERATE 35: CPT

## 2023-11-17 RX ORDER — MOMETASONE FUROATE 220 UG/1
2 INHALANT RESPIRATORY (INHALATION)
Refills: 0 | Status: DISCONTINUED | OUTPATIENT
Start: 2023-11-17 | End: 2023-12-13

## 2023-11-17 RX ORDER — DULOXETINE HYDROCHLORIDE 30 MG/1
20 CAPSULE, DELAYED RELEASE ORAL DAILY
Refills: 0 | Status: DISCONTINUED | OUTPATIENT
Start: 2023-11-18 | End: 2023-11-20

## 2023-11-17 RX ORDER — TOPIRAMATE 25 MG
25 TABLET ORAL AT BEDTIME
Refills: 0 | Status: DISCONTINUED | OUTPATIENT
Start: 2023-11-18 | End: 2023-11-24

## 2023-11-17 RX ADMIN — Medication 3 MILLIGRAM(S): at 20:38

## 2023-11-17 RX ADMIN — Medication 25 MILLIGRAM(S): at 18:45

## 2023-11-17 RX ADMIN — SERTRALINE 50 MILLIGRAM(S): 25 TABLET, FILM COATED ORAL at 08:23

## 2023-11-17 RX ADMIN — Medication 1 MILLIGRAM(S): at 20:38

## 2023-11-17 RX ADMIN — Medication 25 MILLIGRAM(S): at 08:24

## 2023-11-17 RX ADMIN — Medication 175 MICROGRAM(S): at 06:48

## 2023-11-17 RX ADMIN — NALTREXONE HYDROCHLORIDE 50 MILLIGRAM(S): 50 TABLET, FILM COATED ORAL at 08:24

## 2023-11-17 RX ADMIN — DULOXETINE HYDROCHLORIDE 20 MILLIGRAM(S): 30 CAPSULE, DELAYED RELEASE ORAL at 08:24

## 2023-11-17 RX ADMIN — MOMETASONE FUROATE 2 PUFF(S): 220 INHALANT RESPIRATORY (INHALATION) at 09:41

## 2023-11-17 RX ADMIN — ALBUTEROL 2 PUFF(S): 90 AEROSOL, METERED ORAL at 09:07

## 2023-11-17 RX ADMIN — MOMETASONE FUROATE 2 PUFF(S): 220 INHALANT RESPIRATORY (INHALATION) at 20:38

## 2023-11-17 RX ADMIN — Medication 1 MILLIGRAM(S): at 08:24

## 2023-11-17 NOTE — BH INPATIENT PSYCHIATRY PROGRESS NOTE - NSBHMETABOLIC_PSY_ALL_CORE_FT
BMI: BMI (kg/m2): 29.1 (11-14-23 @ 15:15)  HbA1c: A1C with Estimated Average Glucose Result: 5.1 % (11-15-23 @ 09:30)    Glucose:   BP: 119/82 (11-16-23 @ 20:55) (119/82 - 119/86)  Lipid Panel: Date/Time: 11-15-23 @ 09:30  Cholesterol, Serum: 201  Direct LDL: --  HDL Cholesterol, Serum: 47  Total Cholesterol/HDL Ration Measurement: --  Triglycerides, Serum: 165

## 2023-11-17 NOTE — BH INPATIENT PSYCHIATRY PROGRESS NOTE - NSBHASSESSSUMMFT_PSY_ALL_CORE
Patient is a 32 y/o  woman, she is  with a 2y/o, currently domiciled with her , unemployed and supported by her family, patient is Estonian speaker; that was admitted initially to the medical floor after she tried to commit suicide by OD on Wellbutrin. Patient has been medically clear and later on transfer to the unit 2W.    Today, pt remains sad and depressed though some improvements now that she is denying pain from HA. Will change medication dosing times as above since pt is reporting increase daytime sedation and states she usually takes medications separately at home.     Plan:  1. Admit to the unit 2W on a voluntary status   2. Q 15 min checks   3. Medications      Start Zoloft 50mg daily tomorrow      Start Gabapentin 100mg TID     Continue Prazosin 1 mg at bedtime   4. Medical team to evaluate the patient as needed.  5. SW to coordinate a safe discharge plan.

## 2023-11-17 NOTE — BH INPATIENT PSYCHIATRY PROGRESS NOTE - ATTENDING COMMENTS
Care was discussed and reviewed in interdisciplinary treatment team.  I, Jesi Shea MD, have reviewed and verified the documentation.  I independently performed the documented medical decision making. Care was discussed and reviewed in interdisciplinary treatment team.  I, Jesi Shea MD, have reviewed and verified the documentation.  I independently performed the documented medical decision making.    I had an opportunity to speak with patient's  and mother. Family reported that she patient is starting to look better but they agree that she needs to continue to be in the hospital. Family was educated about the treatment and tentative discharge plans. They are very supportive and will continue visiting the patient daily.

## 2023-11-17 NOTE — BH INPATIENT PSYCHIATRY PROGRESS NOTE - NSBHFUPINTERVALHXFT_PSY_A_CORE
Pt assessed for depression s/p SA via OD on wellbutrin. Chart reviewed and case discussed with treatment team. No interval events reported overnight. Pt seen with NM present who also provided Slovenian translation for the pt. Pt calm and cooperative on approach, pt reports chronic HA has stopped with medications. Pt now states that taking all the medications in the morning makes her feel some dizziness and asked that the medication times be changed to how she takes them at home. Pt asked that her naltrexone remain in the AM, cymbalta be moved to 5PM, increase the asamex to BID (pt's home dose) so that she does not need to iuse albuterol as often, and the recently started topamax be changed to QHS as she feels some sedation from this. Pt reports feeling some sadness. Denies active SIIP and agrees to come to staff immediately with safety concerns.

## 2023-11-17 NOTE — BH INPATIENT PSYCHIATRY PROGRESS NOTE - NSBHCHARTREVIEWVS_PSY_A_CORE FT
Vital Signs Last 24 Hrs  T(C): 36.4 (11-17-23 @ 07:21), Max: 36.4 (11-17-23 @ 07:21)  T(F): 97.6 (11-17-23 @ 07:21), Max: 97.6 (11-17-23 @ 07:21)  HR: 75 (11-16-23 @ 20:55) (75 - 75)  BP: 119/82 (11-16-23 @ 20:55) (119/82 - 119/82)  BP(mean): --  RR: 18 (11-17-23 @ 07:21) (18 - 18)  SpO2: 99% (11-17-23 @ 07:21) (99% - 99%)    Orthostatic VS  11-17-23 @ 07:21  Lying BP: --/-- HR: --  Sitting BP: 114/76 HR: 74  Standing BP: 118/74 HR: 82  Site: --  Mode: --  Orthostatic VS  11-16-23 @ 07:46  Lying BP: --/-- HR: --  Sitting BP: 131/83 HR: 84  Standing BP: 143/79 HR: 96  Site: --  Mode: --

## 2023-11-17 NOTE — BH INPATIENT PSYCHIATRY PROGRESS NOTE - CURRENT MEDICATION
MEDICATIONS  (STANDING):  folic acid 1 milliGRAM(s) Oral daily  levothyroxine 175 MICROGram(s) Oral daily  melatonin. 3 milliGRAM(s) Oral at bedtime  mometasone 220 MICROgram(s) Inhaler 2 Puff(s) Inhalation two times a day  naltrexone 50 milliGRAM(s) Oral daily  prazosin. 1 milliGRAM(s) Oral at bedtime  sertraline 50 milliGRAM(s) Oral daily    MEDICATIONS  (PRN):  acetaminophen     Tablet .. 650 milliGRAM(s) Oral every 6 hours PRN Mild Pain (1 - 3)  albuterol    90 MICROgram(s) HFA Inhaler 2 Puff(s) Inhalation every 6 hours PRN asthma  hydrOXYzine hydrochloride 25 milliGRAM(s) Oral every 6 hours PRN Anxiety  ibuprofen  Tablet. 600 milliGRAM(s) Oral every 8 hours PRN Moderate Pain (4 - 6)  lidocaine   4% Patch 1 Patch Transdermal daily PRN hip pain  OLANZapine 5 milliGRAM(s) Oral every 6 hours PRN Severe agitation or psychosis  traZODone 50 milliGRAM(s) Oral at bedtime PRN Insomnia

## 2023-11-18 PROCEDURE — 99232 SBSQ HOSP IP/OBS MODERATE 35: CPT

## 2023-11-18 RX ADMIN — LIDOCAINE 1 PATCH: 4 CREAM TOPICAL at 17:50

## 2023-11-18 RX ADMIN — Medication 1 MILLIGRAM(S): at 09:41

## 2023-11-18 RX ADMIN — Medication 175 MICROGRAM(S): at 05:37

## 2023-11-18 RX ADMIN — Medication 25 MILLIGRAM(S): at 21:07

## 2023-11-18 RX ADMIN — Medication 600 MILLIGRAM(S): at 21:07

## 2023-11-18 RX ADMIN — NALTREXONE HYDROCHLORIDE 50 MILLIGRAM(S): 50 TABLET, FILM COATED ORAL at 09:41

## 2023-11-18 RX ADMIN — Medication 1 MILLIGRAM(S): at 21:07

## 2023-11-18 RX ADMIN — Medication 3 MILLIGRAM(S): at 21:07

## 2023-11-18 RX ADMIN — Medication 25 MILLIGRAM(S): at 10:03

## 2023-11-18 RX ADMIN — MOMETASONE FUROATE 2 PUFF(S): 220 INHALANT RESPIRATORY (INHALATION) at 21:09

## 2023-11-18 RX ADMIN — MOMETASONE FUROATE 2 PUFF(S): 220 INHALANT RESPIRATORY (INHALATION) at 10:04

## 2023-11-18 RX ADMIN — SERTRALINE 50 MILLIGRAM(S): 25 TABLET, FILM COATED ORAL at 09:41

## 2023-11-18 NOTE — BH INPATIENT PSYCHIATRY PROGRESS NOTE - NSBHMETABOLIC_PSY_ALL_CORE_FT
BMI: BMI (kg/m2): 29.1 (11-14-23 @ 15:15)  HbA1c: A1C with Estimated Average Glucose Result: 5.1 % (11-15-23 @ 09:30)    Glucose:   BP: 119/82 (11-16-23 @ 20:55) (119/82 - 119/82)  Lipid Panel: Date/Time: 11-15-23 @ 09:30  Cholesterol, Serum: 201  Direct LDL: --  HDL Cholesterol, Serum: 47  Total Cholesterol/HDL Ration Measurement: --  Triglycerides, Serum: 165   BMI: BMI (kg/m2): 29.1 (11-14-23 @ 15:15)  HbA1c: A1C with Estimated Average Glucose Result: 5.1 % (11-15-23 @ 09:30)    Glucose:   BP: 123/76 (11-18-23 @ 20:38) (119/82 - 123/76)  Lipid Panel: Date/Time: 11-15-23 @ 09:30  Cholesterol, Serum: 201  Direct LDL: --  HDL Cholesterol, Serum: 47  Total Cholesterol/HDL Ration Measurement: --  Triglycerides, Serum: 165

## 2023-11-18 NOTE — BH INPATIENT PSYCHIATRY PROGRESS NOTE - CURRENT MEDICATION
MEDICATIONS  (STANDING):  DULoxetine 20 milliGRAM(s) Oral daily  folic acid 1 milliGRAM(s) Oral daily  levothyroxine 175 MICROGram(s) Oral daily  melatonin. 3 milliGRAM(s) Oral at bedtime  mometasone 220 MICROgram(s) Inhaler 2 Puff(s) Inhalation two times a day  naltrexone 50 milliGRAM(s) Oral daily  prazosin. 1 milliGRAM(s) Oral at bedtime  sertraline 50 milliGRAM(s) Oral daily  topiramate 25 milliGRAM(s) Oral at bedtime    MEDICATIONS  (PRN):  acetaminophen     Tablet .. 650 milliGRAM(s) Oral every 6 hours PRN Mild Pain (1 - 3)  albuterol    90 MICROgram(s) HFA Inhaler 2 Puff(s) Inhalation every 6 hours PRN asthma  hydrOXYzine hydrochloride 25 milliGRAM(s) Oral every 6 hours PRN Anxiety  ibuprofen  Tablet. 600 milliGRAM(s) Oral every 8 hours PRN Moderate Pain (4 - 6)  lidocaine   4% Patch 1 Patch Transdermal daily PRN hip pain  OLANZapine 5 milliGRAM(s) Oral every 6 hours PRN Severe agitation or psychosis  traZODone 50 milliGRAM(s) Oral at bedtime PRN Insomnia   MEDICATIONS  (STANDING):  DULoxetine 20 milliGRAM(s) Oral daily  folic acid 1 milliGRAM(s) Oral daily  levothyroxine 175 MICROGram(s) Oral daily  melatonin. 3 milliGRAM(s) Oral at bedtime  mometasone 220 MICROgram(s) Inhaler 2 Puff(s) Inhalation two times a day  naltrexone 50 milliGRAM(s) Oral daily  oxybutynin 5 milliGRAM(s) Oral two times a day  prazosin. 1 milliGRAM(s) Oral at bedtime  sertraline 50 milliGRAM(s) Oral daily  topiramate 25 milliGRAM(s) Oral at bedtime    MEDICATIONS  (PRN):  acetaminophen     Tablet .. 650 milliGRAM(s) Oral every 6 hours PRN Mild Pain (1 - 3)  albuterol    90 MICROgram(s) HFA Inhaler 2 Puff(s) Inhalation every 6 hours PRN asthma  hydrOXYzine hydrochloride 25 milliGRAM(s) Oral every 6 hours PRN Anxiety  ibuprofen  Tablet. 600 milliGRAM(s) Oral every 8 hours PRN Moderate Pain (4 - 6)  lidocaine   4% Patch 1 Patch Transdermal daily PRN hip pain  OLANZapine 5 milliGRAM(s) Oral every 6 hours PRN Severe agitation or psychosis  traZODone 50 milliGRAM(s) Oral at bedtime PRN Insomnia

## 2023-11-18 NOTE — BH INPATIENT PSYCHIATRY PROGRESS NOTE - NSBHCHARTREVIEWVS_PSY_A_CORE FT
Vital Signs Last 24 Hrs  T(C): 36.7 (11-18-23 @ 07:36), Max: 36.7 (11-18-23 @ 07:36)  T(F): 98 (11-18-23 @ 07:36), Max: 98 (11-18-23 @ 07:36)  HR: --  BP: --  BP(mean): --  RR: 17 (11-18-23 @ 07:36) (17 - 17)  SpO2: --    Orthostatic VS  11-18-23 @ 07:36  Lying BP: --/-- HR: --  Sitting BP: 119/67 HR: 84  Standing BP: 114/71 HR: 96  Site: --  Mode: --  Orthostatic VS  11-17-23 @ 20:26  Lying BP: --/-- HR: --  Sitting BP: 120/79 HR: 69  Standing BP: 121/75 HR: 82  Site: --  Mode: --  Orthostatic VS  11-17-23 @ 07:21  Lying BP: --/-- HR: --  Sitting BP: 114/76 HR: 74  Standing BP: 118/74 HR: 82  Site: --  Mode: --   Vital Signs Last 24 Hrs  T(C): 36.4 (11-19-23 @ 07:40), Max: 36.6 (11-18-23 @ 20:38)  T(F): 97.5 (11-19-23 @ 07:40), Max: 97.9 (11-18-23 @ 20:38)  HR: 73 (11-18-23 @ 20:38) (73 - 73)  BP: 123/76 (11-18-23 @ 20:38) (123/76 - 123/76)  BP(mean): --  RR: 19 (11-19-23 @ 07:40) (19 - 19)  SpO2: --    Orthostatic VS  11-19-23 @ 07:40  Lying BP: --/-- HR: --  Sitting BP: 144/80 HR: 75  Standing BP: 133/74 HR: 88  Site: --  Mode: --  Orthostatic VS  11-18-23 @ 07:36  Lying BP: --/-- HR: --  Sitting BP: 119/67 HR: 84  Standing BP: 114/71 HR: 96  Site: --  Mode: --  Orthostatic VS  11-17-23 @ 20:26  Lying BP: --/-- HR: --  Sitting BP: 120/79 HR: 69  Standing BP: 121/75 HR: 82  Site: --  Mode: --

## 2023-11-18 NOTE — BH INPATIENT PSYCHIATRY PROGRESS NOTE - NSBHASSESSSUMMFT_PSY_ALL_CORE
Patient is a 32 y/o  woman, she is  with a 4y/o, currently domiciled with her , unemployed and supported by her family, patient is Japanese speaker; that was admitted initially to the medical floor after she tried to commit suicide by OD on Wellbutrin. Patient has been medically clear and later on transfer to the unit 2W.    Today, pt remains sad and depressed though some improvements now that she is denying pain from HA. Will change medication dosing times as above since pt is reporting increase daytime sedation and states she usually takes medications separately at home.     Plan:  1. Admit to the unit 2W on a voluntary status   2. Q 15 min checks   3. Medications      Start Zoloft 50mg daily tomorrow      Start Gabapentin 100mg TID     Continue Prazosin 1 mg at bedtime   4. Medical team to evaluate the patient as needed.  5. SW to coordinate a safe discharge plan.

## 2023-11-18 NOTE — BH INPATIENT PSYCHIATRY PROGRESS NOTE - NSBHFUPINTERVALHXFT_PSY_A_CORE
Pt assessed for depression s/p SA via OD on wellbutrin. Chart reviewed and case discussed with treatment team. No interval events reported overnight. Pt seen with NM present who also provided Irish translation for the pt. Pt calm and cooperative on approach, pt reports chronic HA has stopped with medications. Pt now states that taking all the medications in the morning makes her feel some dizziness and asked that the medication times be changed to how she takes them at home. Pt asked that her naltrexone remain in the AM, cymbalta be moved to 5PM, increase the asamex to BID (pt's home dose) so that she does not need to iuse albuterol as often, and the recently started topamax be changed to QHS as she feels some sedation from this. Pt reports feeling some sadness. Denies active SIIP and agrees to come to staff immediately with safety concerns.  Pt assessed for depression s/p SA via OD on Wellbutrin. Chart reviewed and case discussed with treatment team. No interval events reported overnight. Pt seen with NM present who also provided Amharic translation for the pt. Pt calm and cooperative on approach, pt reports chronic HA has stopped with medications. Pt now states that taking all the medications in the morning makes her feel some dizziness and asked that the medication times be changed to how she takes them at home. Pt asked that her naltrexone remain in the AM, cymbalta be moved to 5PM, increase the asamex to BID (pt's home dose) so that she does not need to iuse albuterol as often, and the recently started topamax be changed to QHS as she feels some sedation from this. Pt reports feeling some sadness. Denies active SIIP and agrees to come to staff immediately with safety concerns.  Pt assessed for depression s/p SA via OD on Wellbutrin. Chart reviewed and case discussed with nursing team.   Pt was seen with telephone  ([employer]  [phone]).  Pt was calm and pleasant on approach.    Pt has reported earlier that her chronic HAs has stopped with medications. Pt now states that taking all the medications in the morning makes her feel some dizziness and asked that the medication times be changed to how she takes them at home. Pt reports feeling some feeling of sadness and depression but did have brighter affect when talking about a movie she was watching on tv prior to the meeting. Denies active SIIP and agrees to come to staff immediately with safety concerns.

## 2023-11-18 NOTE — BH INPATIENT PSYCHIATRY PROGRESS NOTE - ATTENDING COMMENTS
Care was discussed and reviewed in interdisciplinary treatment team.  I, Jesi Shea MD, have reviewed and verified the documentation.  I independently performed the documented medical decision making.    I had an opportunity to speak with patient's  and mother. Family reported that she patient is starting to look better but they agree that she needs to continue to be in the hospital. Family was educated about the treatment and tentative discharge plans. They are very supportive and will continue visiting the patient daily.

## 2023-11-19 PROCEDURE — 99232 SBSQ HOSP IP/OBS MODERATE 35: CPT

## 2023-11-19 RX ORDER — OXYBUTYNIN CHLORIDE 5 MG
5 TABLET ORAL
Refills: 0 | Status: DISCONTINUED | OUTPATIENT
Start: 2023-11-19 | End: 2023-12-13

## 2023-11-19 RX ADMIN — Medication 1 MILLIGRAM(S): at 08:21

## 2023-11-19 RX ADMIN — Medication 1 MILLIGRAM(S): at 21:21

## 2023-11-19 RX ADMIN — MOMETASONE FUROATE 2 PUFF(S): 220 INHALANT RESPIRATORY (INHALATION) at 21:24

## 2023-11-19 RX ADMIN — DULOXETINE HYDROCHLORIDE 20 MILLIGRAM(S): 30 CAPSULE, DELAYED RELEASE ORAL at 08:19

## 2023-11-19 RX ADMIN — MOMETASONE FUROATE 2 PUFF(S): 220 INHALANT RESPIRATORY (INHALATION) at 08:20

## 2023-11-19 RX ADMIN — Medication 3 MILLIGRAM(S): at 21:21

## 2023-11-19 RX ADMIN — Medication 175 MICROGRAM(S): at 06:28

## 2023-11-19 RX ADMIN — Medication 5 MILLIGRAM(S): at 21:21

## 2023-11-19 RX ADMIN — SERTRALINE 50 MILLIGRAM(S): 25 TABLET, FILM COATED ORAL at 08:20

## 2023-11-19 RX ADMIN — Medication 25 MILLIGRAM(S): at 21:21

## 2023-11-19 RX ADMIN — Medication 50 MILLIGRAM(S): at 21:21

## 2023-11-19 RX ADMIN — Medication 25 MILLIGRAM(S): at 15:12

## 2023-11-19 RX ADMIN — NALTREXONE HYDROCHLORIDE 50 MILLIGRAM(S): 50 TABLET, FILM COATED ORAL at 08:21

## 2023-11-19 NOTE — BH INPATIENT PSYCHIATRY PROGRESS NOTE - NSBHCHARTREVIEWVS_PSY_A_CORE FT
Vital Signs Last 24 Hrs  T(C): 36.4 (11-19-23 @ 07:40), Max: 36.6 (11-18-23 @ 20:38)  T(F): 97.5 (11-19-23 @ 07:40), Max: 97.9 (11-18-23 @ 20:38)  HR: 73 (11-18-23 @ 20:38) (73 - 73)  BP: 123/76 (11-18-23 @ 20:38) (123/76 - 123/76)  BP(mean): --  RR: 19 (11-19-23 @ 07:40) (19 - 19)  SpO2: --    Orthostatic VS  11-19-23 @ 07:40  Lying BP: --/-- HR: --  Sitting BP: 144/80 HR: 75  Standing BP: 133/74 HR: 88  Site: --  Mode: --  Orthostatic VS  11-18-23 @ 07:36  Lying BP: --/-- HR: --  Sitting BP: 119/67 HR: 84  Standing BP: 114/71 HR: 96  Site: --  Mode: --  Orthostatic VS  11-17-23 @ 20:26  Lying BP: --/-- HR: --  Sitting BP: 120/79 HR: 69  Standing BP: 121/75 HR: 82  Site: --  Mode: --

## 2023-11-19 NOTE — BH INPATIENT PSYCHIATRY PROGRESS NOTE - NSBHASSESSSUMMFT_PSY_ALL_CORE
Patient is a 32 y/o  woman, she is  with a 2y/o, currently domiciled with her , unemployed and supported by her family, patient is [place] speaker; that was admitted initially to the medical floor after she tried to commit suicide by OD on Wellbutrin. Patient has been medically clear and later on transfer to the unit 2W.    Today, pt remains sad and depressed though some improvements now that she is denying pain from HA. Will change medication dosing times as above since pt is reporting increase daytime sedation and states she usually takes medications separately at home.     Plan:  1. Admit to the unit 2W on a voluntary status   2. Q 15 min checks   3. Medications      Start Zoloft 50mg daily tomorrow      Start Gabapentin 100mg TID     Continue Prazosin 1 mg at bedtime   4. Medical team to evaluate the patient as needed.  5. SW to coordinate a safe discharge plan.

## 2023-11-19 NOTE — BH INPATIENT PSYCHIATRY PROGRESS NOTE - NSBHMETABOLIC_PSY_ALL_CORE_FT
BMI: BMI (kg/m2): 29.1 (11-14-23 @ 15:15)  HbA1c: A1C with Estimated Average Glucose Result: 5.1 % (11-15-23 @ 09:30)    Glucose:   BP: 123/76 (11-18-23 @ 20:38) (119/82 - 123/76)  Lipid Panel: Date/Time: 11-15-23 @ 09:30  Cholesterol, Serum: 201  Direct LDL: --  HDL Cholesterol, Serum: 47  Total Cholesterol/HDL Ration Measurement: --  Triglycerides, Serum: 165

## 2023-11-19 NOTE — BH INPATIENT PSYCHIATRY PROGRESS NOTE - NSBHFUPINTERVALHXFT_PSY_A_CORE
Pt assessed for depression s/p SA via OD on Wellbutrin. Chart reviewed and case discussed with nursing team.  Pt was seen with [place] translation  ([employer]  [phone]) for the pt. Pt calm and pleasant on approach.  Pt now states that taking all the medications in the morning makes her feel some dizziness and asked that the medication times be changed to how she takes them at home. Pt asked that her naltrexone remain in the AM, Cymbalta be moved to 5PM.   Pt reports feeling some sadness. She also asks if possible, she would like to speak to a [place] speaking staff member.  Denies active SIIP and agrees to come to staff immediately with safety concerns.

## 2023-11-19 NOTE — BH INPATIENT PSYCHIATRY PROGRESS NOTE - CURRENT MEDICATION
MEDICATIONS  (STANDING):  DULoxetine 20 milliGRAM(s) Oral daily  folic acid 1 milliGRAM(s) Oral daily  levothyroxine 175 MICROGram(s) Oral daily  melatonin. 3 milliGRAM(s) Oral at bedtime  mometasone 220 MICROgram(s) Inhaler 2 Puff(s) Inhalation two times a day  naltrexone 50 milliGRAM(s) Oral daily  oxybutynin 5 milliGRAM(s) Oral two times a day  prazosin. 1 milliGRAM(s) Oral at bedtime  sertraline 50 milliGRAM(s) Oral daily  topiramate 25 milliGRAM(s) Oral at bedtime    MEDICATIONS  (PRN):  acetaminophen     Tablet .. 650 milliGRAM(s) Oral every 6 hours PRN Mild Pain (1 - 3)  albuterol    90 MICROgram(s) HFA Inhaler 2 Puff(s) Inhalation every 6 hours PRN asthma  hydrOXYzine hydrochloride 25 milliGRAM(s) Oral every 6 hours PRN Anxiety  ibuprofen  Tablet. 600 milliGRAM(s) Oral every 8 hours PRN Moderate Pain (4 - 6)  lidocaine   4% Patch 1 Patch Transdermal daily PRN hip pain  OLANZapine 5 milliGRAM(s) Oral every 6 hours PRN Severe agitation or psychosis  traZODone 50 milliGRAM(s) Oral at bedtime PRN Insomnia

## 2023-11-20 PROCEDURE — 99232 SBSQ HOSP IP/OBS MODERATE 35: CPT

## 2023-11-20 RX ORDER — DULOXETINE HYDROCHLORIDE 30 MG/1
40 CAPSULE, DELAYED RELEASE ORAL DAILY
Refills: 0 | Status: DISCONTINUED | OUTPATIENT
Start: 2023-11-20 | End: 2023-11-22

## 2023-11-20 RX ORDER — SERTRALINE 25 MG/1
75 TABLET, FILM COATED ORAL DAILY
Refills: 0 | Status: DISCONTINUED | OUTPATIENT
Start: 2023-11-20 | End: 2023-11-24

## 2023-11-20 RX ADMIN — Medication 1 MILLIGRAM(S): at 09:01

## 2023-11-20 RX ADMIN — SERTRALINE 50 MILLIGRAM(S): 25 TABLET, FILM COATED ORAL at 09:02

## 2023-11-20 RX ADMIN — NALTREXONE HYDROCHLORIDE 50 MILLIGRAM(S): 50 TABLET, FILM COATED ORAL at 09:50

## 2023-11-20 RX ADMIN — Medication 3 MILLIGRAM(S): at 20:48

## 2023-11-20 RX ADMIN — MOMETASONE FUROATE 2 PUFF(S): 220 INHALANT RESPIRATORY (INHALATION) at 09:02

## 2023-11-20 RX ADMIN — DULOXETINE HYDROCHLORIDE 20 MILLIGRAM(S): 30 CAPSULE, DELAYED RELEASE ORAL at 09:02

## 2023-11-20 RX ADMIN — Medication 5 MILLIGRAM(S): at 09:50

## 2023-11-20 RX ADMIN — MOMETASONE FUROATE 2 PUFF(S): 220 INHALANT RESPIRATORY (INHALATION) at 20:48

## 2023-11-20 RX ADMIN — Medication 5 MILLIGRAM(S): at 20:48

## 2023-11-20 RX ADMIN — Medication 1 MILLIGRAM(S): at 20:48

## 2023-11-20 RX ADMIN — Medication 175 MICROGRAM(S): at 06:32

## 2023-11-20 RX ADMIN — Medication 25 MILLIGRAM(S): at 20:48

## 2023-11-20 NOTE — BH INPATIENT PSYCHIATRY PROGRESS NOTE - NSBHMETABOLIC_PSY_ALL_CORE_FT
BMI: BMI (kg/m2): 29.1 (11-14-23 @ 15:15)  HbA1c: A1C with Estimated Average Glucose Result: 5.1 % (11-15-23 @ 09:30)    Glucose:   BP: 122/75 (11-19-23 @ 20:40) (122/75 - 123/76)  Lipid Panel: Date/Time: 11-15-23 @ 09:30  Cholesterol, Serum: 201  Direct LDL: --  HDL Cholesterol, Serum: 47  Total Cholesterol/HDL Ration Measurement: --  Triglycerides, Serum: 165

## 2023-11-20 NOTE — BH INPATIENT PSYCHIATRY PROGRESS NOTE - ATTENDING COMMENTS
Care was discussed and reviewed in interdisciplinary treatment team.  I, Jesi Shea MD, have reviewed and verified the documentation.  I independently performed the documented medical decision making.    I had an opportunity to speak with patient's  and mother. Family reported that she patient is starting to look better but they agree that she needs to continue to be in the hospital. Family was educated about the treatment and tentative discharge plans. They are very supportive and will continue visiting the patient daily.  Care was discussed and reviewed in interdisciplinary treatment team.  I, eJsi Shea MD, have reviewed and verified the documentation.  I independently performed the documented medical decision making.

## 2023-11-20 NOTE — BH INPATIENT PSYCHIATRY PROGRESS NOTE - NSBHFUPINTERVALHXFT_PSY_A_CORE
Pt assessed for depression s/p SA via OD on wellbutrin. Chart reviewed and case discussed with treatment team. No interval events reported overnight. Pt seen with medical student and Dr. Dino bryan who also provided [place] translation for the pt. Pt calm and cooperative on approach, pt reports chronic HA has stopped, reported an increase in anxiety over the weekend for which atarax PRN was efficacious. Pt agrees to further increase in zoloft to 75 mg OO QD to help address anxiety and cymbalta to 40 mg PO QD for fibromyalgia pain. Pt denies further dizziness with the change of medication dosing times; reports improved asthma sxs with increase of asmaex to BID dosing. Denies active SIIP and agrees to come to staff immediately with safety concerns. Pt states rehab therapy was able to provide her DBT worksheets in [place]. Pt reports coping mechanisms include reading the bible and coloring. Discussed dispo planning and pt states her family agrees she continues to require hospitalization at this time. Pt reports some urinary frequency and noted to have been started on ditropan by covering provider over the weekend (pt states she was on this in the past). UA and urine culture ordered - pending results

## 2023-11-20 NOTE — BH INPATIENT PSYCHIATRY PROGRESS NOTE - NSBHCHARTREVIEWVS_PSY_A_CORE FT
Vital Signs Last 24 Hrs  T(C): 36.4 (11-20-23 @ 07:53), Max: 36.4 (11-20-23 @ 07:53)  T(F): 97.6 (11-20-23 @ 07:53), Max: 97.6 (11-20-23 @ 07:53)  HR: 79 (11-19-23 @ 20:40) (79 - 79)  BP: 122/75 (11-19-23 @ 20:40) (122/75 - 122/75)  BP(mean): --  RR: 19 (11-20-23 @ 07:53) (19 - 19)  SpO2: --    Orthostatic VS  11-20-23 @ 07:53  Lying BP: --/-- HR: --  Sitting BP: 115/88 HR: 90  Standing BP: 120/72 HR: 105  Site: --  Mode: --  Orthostatic VS  11-19-23 @ 07:40  Lying BP: --/-- HR: --  Sitting BP: 144/80 HR: 75  Standing BP: 133/74 HR: 88  Site: --  Mode: --

## 2023-11-20 NOTE — BH INPATIENT PSYCHIATRY PROGRESS NOTE - CURRENT MEDICATION
MEDICATIONS  (STANDING):  DULoxetine 40 milliGRAM(s) Oral daily  folic acid 1 milliGRAM(s) Oral daily  levothyroxine 175 MICROGram(s) Oral daily  melatonin. 3 milliGRAM(s) Oral at bedtime  mometasone 220 MICROgram(s) Inhaler 2 Puff(s) Inhalation two times a day  naltrexone 50 milliGRAM(s) Oral daily  oxybutynin 5 milliGRAM(s) Oral two times a day  prazosin. 1 milliGRAM(s) Oral at bedtime  sertraline 75 milliGRAM(s) Oral daily  topiramate 25 milliGRAM(s) Oral at bedtime    MEDICATIONS  (PRN):  acetaminophen     Tablet .. 650 milliGRAM(s) Oral every 6 hours PRN Mild Pain (1 - 3)  albuterol    90 MICROgram(s) HFA Inhaler 2 Puff(s) Inhalation every 6 hours PRN asthma  hydrOXYzine hydrochloride 25 milliGRAM(s) Oral every 6 hours PRN Anxiety  ibuprofen  Tablet. 600 milliGRAM(s) Oral every 8 hours PRN Moderate Pain (4 - 6)  lidocaine   4% Patch 1 Patch Transdermal daily PRN hip pain  OLANZapine 5 milliGRAM(s) Oral every 6 hours PRN Severe agitation or psychosis  traZODone 50 milliGRAM(s) Oral at bedtime PRN Insomnia

## 2023-11-20 NOTE — BH INPATIENT PSYCHIATRY PROGRESS NOTE - NSBHASSESSSUMMFT_PSY_ALL_CORE
Patient is a 34 y/o  woman, she is  with a 2y/o, currently domiciled with her , unemployed and supported by her family, patient is Guyanese speaker; that was admitted initially to the medical floor after she tried to commit suicide by OD on Wellbutrin. Patient has been medically clear and later on transfer to the unit 2W.    Today, pt remains depressed though some improvements now that she is denying pain from HA, reports increase in anxiety over the weekend. Improved daytime sedation with change in medication dosing times. Improved asthma sxs with increase in asmaex dosing. Will increase zoloft to 75 mg PO QD for anxiety and Cymbalta to 40 mg PO QD for fibromyalgia pain       Plan:  1. Admit to the unit 2W on a voluntary status   2. Q 15 min checks   3. Medications      increase Zoloft to 100mg daily      d/c Gabapentin 100mg TID     Continue Prazosin 1 mg at bedtime   4. Medical team to evaluate the patient as needed.  5. SW to coordinate a safe discharge plan.

## 2023-11-21 PROCEDURE — 99232 SBSQ HOSP IP/OBS MODERATE 35: CPT

## 2023-11-21 RX ORDER — LIDOCAINE 4 G/100G
2 CREAM TOPICAL DAILY
Refills: 0 | Status: DISCONTINUED | OUTPATIENT
Start: 2023-11-21 | End: 2023-11-22

## 2023-11-21 RX ADMIN — NALTREXONE HYDROCHLORIDE 50 MILLIGRAM(S): 50 TABLET, FILM COATED ORAL at 09:13

## 2023-11-21 RX ADMIN — Medication 1 MILLIGRAM(S): at 21:03

## 2023-11-21 RX ADMIN — SERTRALINE 75 MILLIGRAM(S): 25 TABLET, FILM COATED ORAL at 08:57

## 2023-11-21 RX ADMIN — Medication 25 MILLIGRAM(S): at 17:12

## 2023-11-21 RX ADMIN — Medication 600 MILLIGRAM(S): at 11:09

## 2023-11-21 RX ADMIN — Medication 25 MILLIGRAM(S): at 21:03

## 2023-11-21 RX ADMIN — Medication 3 MILLIGRAM(S): at 21:03

## 2023-11-21 RX ADMIN — Medication 5 MILLIGRAM(S): at 08:57

## 2023-11-21 RX ADMIN — Medication 1 MILLIGRAM(S): at 08:57

## 2023-11-21 RX ADMIN — Medication 175 MICROGRAM(S): at 05:52

## 2023-11-21 RX ADMIN — MOMETASONE FUROATE 2 PUFF(S): 220 INHALANT RESPIRATORY (INHALATION) at 21:03

## 2023-11-21 RX ADMIN — Medication 600 MILLIGRAM(S): at 12:01

## 2023-11-21 RX ADMIN — Medication 5 MILLIGRAM(S): at 21:03

## 2023-11-21 RX ADMIN — DULOXETINE HYDROCHLORIDE 40 MILLIGRAM(S): 30 CAPSULE, DELAYED RELEASE ORAL at 09:12

## 2023-11-21 RX ADMIN — MOMETASONE FUROATE 2 PUFF(S): 220 INHALANT RESPIRATORY (INHALATION) at 11:09

## 2023-11-21 RX ADMIN — LIDOCAINE 2 PATCH: 4 CREAM TOPICAL at 11:12

## 2023-11-21 NOTE — BH INPATIENT PSYCHIATRY DISCHARGE NOTE - NSDCMRMEDTOKEN_GEN_ALL_CORE_FT
Albuterol (Eqv-Proventil HFA) 90 mcg/inh inhalation aerosol: 2 puff(s) inhaled every 6 hours as needed for  shortness of breath and/or wheezing  Breztri Aerosphere 160 mcg-9 mcg-4.8 mcg/inh inhalation aerosol: 2 puff(s) inhaled 2 times a day  Citrucel 500 mg oral tablet: 3 tab(s) orally once a day  ferrous sulfate 325 mg (65 mg elemental iron) oral tablet: 1 tab(s) orally 2 times a day  folic acid 1 mg oral tablet: 1 tab(s) orally once a day  hydrOXYzine hydrochloride 25 mg oral tablet: 1 tab(s) orally every 6 hours As needed Anxiety  levothyroxine 175 mcg (0.175 mg) oral tablet: 1 tab(s) orally once a day  melatonin 3 mg oral tablet: 1 tab(s) orally once a day (at bedtime) As needed Insomnia  prazosin 1 mg oral capsule: 1 cap(s) orally once a day (at bedtime)  QUEtiapine 25 mg oral tablet: 1 tab(s) orally every 6 hours As needed agitation  Qvar 80 mcg/inh inhalation aerosol: 1 puff(s) inhaled 2 times a day  Tezspire Pre-filled Pen 210 mg/1.91 mL subcutaneous solution: 210 milligram(s) subcutaneously once a month   albuterol 90 mcg/inh inhalation aerosol: 2 puff(s) inhaled every 6 hours as needed for asthma  ARIPiprazole 5 mg oral tablet: 1 tab(s) orally once a day  DULoxetine 60 mg oral delayed release capsule: 2 cap(s) orally once a day  folic acid 1 mg oral tablet: 1 tab(s) orally once a day  levothyroxine 175 mcg (0.175 mg) oral tablet: 1 tab(s) orally once a day  lidocaine 5% topical ointment: Apply topically to affected area 2 times a day 1 Apply topically to affected area 2 times a day  melatonin 3 mg oral tablet: 1 tab(s) orally once a day (at bedtime)  mometasone 220 mcg/inh inhalation aerosol powder: 2 puff(s) inhaled 2 times a day  Multiple Vitamins with Minerals oral tablet: 1 tab(s) orally once a day (at bedtime)  naltrexone 50 mg oral tablet: 1 tab(s) orally once a day  oxyBUTYnin 5 mg oral tablet: 1 tab(s) orally 2 times a day  pantoprazole 40 mg oral delayed release tablet: 1 tab(s) orally once a day (before a meal)  pregabalin 25 mg oral capsule: 1 cap(s) orally once a day MDD: 25 mg  sertraline 50 mg oral tablet: 3 tab(s) orally once a day  topiramate 50 mg oral tablet: 1 tab(s) orally once a day (at bedtime)

## 2023-11-21 NOTE — BH INPATIENT PSYCHIATRY PROGRESS NOTE - CURRENT MEDICATION
MEDICATIONS  (STANDING):  DULoxetine 40 milliGRAM(s) Oral daily  folic acid 1 milliGRAM(s) Oral daily  levothyroxine 175 MICROGram(s) Oral daily  lidocaine   4% Patch 2 Patch Transdermal daily  melatonin. 3 milliGRAM(s) Oral at bedtime  mometasone 220 MICROgram(s) Inhaler 2 Puff(s) Inhalation two times a day  naltrexone 50 milliGRAM(s) Oral daily  oxybutynin 5 milliGRAM(s) Oral two times a day  prazosin. 1 milliGRAM(s) Oral at bedtime  sertraline 75 milliGRAM(s) Oral daily  topiramate 25 milliGRAM(s) Oral at bedtime    MEDICATIONS  (PRN):  acetaminophen     Tablet .. 650 milliGRAM(s) Oral every 6 hours PRN Mild Pain (1 - 3)  albuterol    90 MICROgram(s) HFA Inhaler 2 Puff(s) Inhalation every 6 hours PRN asthma  hydrOXYzine hydrochloride 25 milliGRAM(s) Oral every 6 hours PRN Anxiety  ibuprofen  Tablet. 600 milliGRAM(s) Oral every 8 hours PRN Moderate Pain (4 - 6)  OLANZapine 5 milliGRAM(s) Oral every 6 hours PRN Severe agitation or psychosis  traZODone 50 milliGRAM(s) Oral at bedtime PRN Insomnia   none

## 2023-11-21 NOTE — BH INPATIENT PSYCHIATRY DISCHARGE NOTE - NSDCCPCAREPLAN_GEN_ALL_CORE_FT
PRINCIPAL DISCHARGE DIAGNOSIS  Diagnosis: MDD (major depressive disorder)  Assessment and Plan of Treatment:       SECONDARY DISCHARGE DIAGNOSES  Diagnosis: Asthma  Assessment and Plan of Treatment:     Diagnosis: Thyroid cancer  Assessment and Plan of Treatment:     Diagnosis: H/O fibromyalgia  Assessment and Plan of Treatment:     Diagnosis: MDD (major depressive disorder)  Assessment and Plan of Treatment:

## 2023-11-21 NOTE — BH INPATIENT PSYCHIATRY PROGRESS NOTE - NSBHMETABOLIC_PSY_ALL_CORE_FT
BMI: BMI (kg/m2): 29.1 (11-14-23 @ 15:15)  HbA1c: A1C with Estimated Average Glucose Result: 5.1 % (11-15-23 @ 09:30)    Glucose:   BP: 116/72 (11-20-23 @ 20:33) (116/72 - 123/76)Vital Signs Last 24 Hrs  T(C): 36.7 (11-21-23 @ 07:32), Max: 36.7 (11-21-23 @ 07:32)  T(F): 98 (11-21-23 @ 07:32), Max: 98 (11-21-23 @ 07:32)  HR: 78 (11-20-23 @ 20:33) (78 - 78)  BP: 116/72 (11-20-23 @ 20:33) (116/72 - 116/72)  BP(mean): --  RR: 17 (11-21-23 @ 07:32) (17 - 18)  SpO2: 100% (11-21-23 @ 07:32) (100% - 100%)    Orthostatic VS  11-21-23 @ 07:32  Lying BP: --/-- HR: --  Sitting BP: 118/57 HR: 79  Standing BP: 100/58 HR: 82  Site: --  Mode: --  Orthostatic VS  11-20-23 @ 07:53  Lying BP: --/-- HR: --  Sitting BP: 115/88 HR: 90  Standing BP: 120/72 HR: 105  Site: --  Mode: --    Lipid Panel: Date/Time: 11-15-23 @ 09:30  Cholesterol, Serum: 201  LDL Cholesterol Calculated: 121  HDL Cholesterol, Serum: 47  Total Cholesterol/HDL Ration Measurement: --  Triglycerides, Serum: 165

## 2023-11-21 NOTE — BH INPATIENT PSYCHIATRY DISCHARGE NOTE - NSBHASSESSSUMMFT_PSY_ALL_CORE
Patient is a 34 y/o  woman, she is  with a 2y/o, currently domiciled with her , unemployed and supported by her family, patient is [place] speaker; that was admitted initially to the medical floor after she tried to commit suicide by OD on Wellbutrin. Patient has been medically clear and later on transfer to the unit 2W.     Pt seen with medical student and Dr. Dino bryan who provided [place] translation. Pt states that she is feeling better, looking forward to discharge. Pt adamantly denies SIIP, HIIP, A/VH, or paranoia. Pt cites [employer] and family as protective factors. Pt educated on the use of 911 or going to the nearest ED if safety concerns should arise in the community; pt verbalized understanding. Pt to be discharged to her 's care. Pt's  did not have any safety concerns re: discharge   Patient is a 34 y/o  woman, she is  with a 4y/o, currently domiciled with her , unemployed and supported by her family, patient is [place] speaker; that was admitted initially to the medical floor after she tried to commit suicide by OD on Wellbutrin. Patient has been medically clear and later on transfer to the unit 2W.     Pt seen with medical student and Dr. Dino bryan who provided [place] translation. Pt states that she is feeling better, looking forward to discharge. Pt adamantly denies SIIP, HIIP, A/VH, or paranoia. Pt cites Hoahaoism and family as protective factors. Pt educated on the use of 911 or going to the nearest ED if safety concerns should arise in the community; pt verbalized understanding. Pt to be discharged to her 's care. Pt's  did not have any safety concerns re: discharge   Patient is a 32 y/o  woman, she is  with a 2y/o, currently domiciled with her , unemployed and supported by her family, patient is [place] speaker; that was admitted initially to the medical floor after she tried to commit suicide by OD on Wellbutrin. Patient has been medically clear and later on transfer to the unit 2W.     Pt seen with medical student and Dr. Dino bryan who provided [place] translation. Pt states that she is feeling better, looking forward to discharge. Pt adamantly denies SIIP, HIIP, A/VH, or paranoia. Pt cites Baptism and family as protective factors. Pt educated on the use of 911 or going to the nearest ED if safety concerns should arise in the community; pt verbalized understanding. Pt to be discharged to her 's care. Pt's  did not have any safety concerns re: discharge

## 2023-11-21 NOTE — BH INPATIENT PSYCHIATRY DISCHARGE NOTE - HOSPITAL COURSE
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 QHS started at [facility] medicine was discontinued (pt with low BPs). Pt was restarted on home medications of naltrexone 50 mg PO QD for pain and lidocaine ointment started for chronic neck and hip pain. Pt provided with medication education including, but not limited to, possible side effects like sedation, dizziness, change in liver function levels, weight gain, N/V, GI upset, EPS, TD, NMS, and metabolic changes; pt verbalized understanding. Pt instructed not to drive or use hazardous machinery or materials while on this medication; pt verbalized understanding. On day of discharge, pt denied SIIP, HIIP, A/VH, IOR, paranoia, thought insertion/withdrawal/broadcasting, magical thinking, special abilities, mind reading, Hinduism preoccupation, sxs of juan, depression, or anxiety. Pt educated on use of 911 in cases of emergency and to go to the nearest ED if feeling unsafe in the community. Pt verbalized understanding. Pt provided with numbers for Suicide Prevention and [facility] and educated on their use; pt verbalized understanding. Pt was educated on the adverse effects these medications may have on a fetus and provided with birth control education; pt verbalized understanding    Pt was discharged on: zoloft 150 mg PO QD, cymbalta 120 mg PO QD, abilify 5 mg PO QD, lyrica 25 mg PO QD, topamax 50 mg PO QHS, naltrexone 50 mg PO QD, folic acid 1 mg PO QD, levothyroxine 175 mcg PO QD, lidocaine ointment 5 % topical BID, melatonin 3 mg PO QHS, ditropan 5 mg PO BID, protonix 40 mg PO QD, asmanex 2 puffs inhaled BID, albuterol 2 puffs inhaled PRN Q6 hours, and multivitamin 1 tab PO QD    er pt request: Dr. Estiven Downey MD endocrinologist called at [phone]; staff will call the pt at home to reschedule pt's April 2024 endocrinology appt to a post hospitalization appt (within the next two weeks)

## 2023-11-21 NOTE — BH INPATIENT PSYCHIATRY DISCHARGE NOTE - NSDCRECOMMENDMEDICALFT_PSY_ALL_CORE
continue outpt follow up care with your neurologist and PCP Dr. Estiven Downey MD endocrinologist called at [phone] and staff will call you at home to reschedule your April endocrinology appt to a post hospitalization appt (within the next two weeks) Dr. Estiven Downey MD endocrinologist called at [phone] and staff will call you at home to reschedule your April endocrinology appt to a post hospitalization appt (within the next two weeks) Dr. Estiven Downey MD endocrinologist called at [phone] and staff will call you at home to reschedule your April endocrinology appt to a post hospitalization appt (within the next two weeks)

## 2023-11-21 NOTE — BH INPATIENT PSYCHIATRY DISCHARGE NOTE - DESCRIPTION
Lives at home with  and spouse. Immigrated from [place]. Highest level of academic achievement was 4 years of college in [facility] but did not finish degree.  Lives at home with  and spouse. Immigrated from [facility]. Highest level of academic achievement was 4 years of college in [facility] but did not finish degree.  Lives at home with  and spouse. Immigrated from [facility] [place]. Highest level of academic achievement was 4 years of college in [facility] but did not finish degree.

## 2023-11-21 NOTE — BH INPATIENT PSYCHIATRY DISCHARGE NOTE - NSBHDCBILLING_PSY_ALL_CORE
54790 (Hospital discharge day management; 30 min or less) 27456 (Hospital discharge day management; 30 min or less) 92218 (Hospital discharge day management; 30 min or less)

## 2023-11-21 NOTE — BH INPATIENT PSYCHIATRY DISCHARGE NOTE - ATTENDING DISCHARGE PHYSICAL EXAMINATION:
Care was discussed and reviewed in interdisciplinary treatment team.  I, Jesi Shea MD, have reviewed and verified the documentation.  I independently performed the documented medical decision making.    Patient is denying any SI and is atul for safety. She denies any HI or hallucinations and no delusions have been elicited. Patient has been educated about the safety plan and the patient understands that if at any time she feels like harming her self or harming others she can call 911 or go to any ER. During the hospital stay patient has not demonstrated any aggressive or self injurious behaviors and this has been consistent with my observations and the observations of the staff.

## 2023-11-21 NOTE — BH INPATIENT PSYCHIATRY DISCHARGE NOTE - NSBHFUPINTERVALHXFT_PSY_A_CORE
Pt assessed for depression. Chart reviewed and case discussed with treatment team. Pt seen with medical student and Dr. Dino bryan who provided Filipino translation. Pt states that she is feeling better, looking forward to discharge. Pt adamantly denies SIIP, HIIP, A/VH, or paranoia. Pt cites [place] and family as protective factors. Pt educated on the use of 911 or going to the nearest ED if safety concerns should arise in the community; pt verbalized understanding. Pt to be discharged to her 's care. Pt's  did not have any safety concerns re: discharge  Pt assessed for depression. Chart reviewed and case discussed with treatment team. Pt seen with medical student and Dr. Dino bryan who provided Bruneian translation. Pt states that she is feeling better, looking forward to discharge. Pt adamantly denies SIIP, HIIP, A/VH, or paranoia. Pt cites Muslim and family as protective factors. Pt educated on the use of 911 or going to the nearest ED if safety concerns should arise in the community; pt verbalized understanding. Pt to be discharged to her 's care. Pt's  did not have any safety concerns re: discharge  Pt assessed for depression. Chart reviewed and case discussed with treatment team. Pt seen with medical student and Dr. Dino bryan who provided Iranian translation. Pt states that she is feeling better, looking forward to discharge. Pt adamantly denies SIIP, HIIP, A/VH, or paranoia. Pt cites Baptism and family as protective factors. Pt educated on the use of 911 or going to the nearest ED if safety concerns should arise in the community; pt verbalized understanding. Pt to be discharged to her 's care. Pt's  did not have any safety concerns re: discharge

## 2023-11-21 NOTE — BH INPATIENT PSYCHIATRY DISCHARGE NOTE - OTHER PAST PSYCHIATRIC HISTORY (INCLUDE DETAILS REGARDING ONSET, COURSE OF ILLNESS, INPATIENT/OUTPATIENT TREATMENT)
Patient is a 34 y/o  woman, she is  with a 2y/o, currently domiciled with her , unemployed and supported by her family, patient is Malay speaker; that was admitted initially to the medical floor after she tried to commit suicide by OD on Wellbutrin. Patient has been medically clear and later on transfer to the unit 2W.       Patient reported that she started feeling depressed after she had her son. When the child was 3mo she was diagnosed with thyroid cancer and was told she was not going to be able to have more children. This was specially hard as she comes from a big family. Then she need to be in cancer treatment and her cancer metastasized. Patient then continue to struggle with a lot of pain, depression and anxiety. Now the cancer in on reemission but after her surgery she needed PT and SLP. She started to see a doctor vie telehealth that put her on Amitriptyline that was effective for some time. She then OD on this medication and the psychiatrist told her she needed to be in a higher level of care but she was not able to get her self into a PHP. She was then changed to Cymbalta and Wellbutrin prescribed by her PCP. She has continue to struggle with her pain, depression and anxiety. Her son was diagnosed with autism and this has been very hard on her.            Before coming to the hospital patient said that she was not able to cope with her symptoms of depression that she describes as feeling sad all the time, she is hopeless, helpless, doesn't sleep or eats. She has been experiencing SI and is overwhelmed. Patient decided to take an OD on her medications and then she talk to the  that brought her to the crisis center.       As per C&L note      33 year old female with PMH significant for depression, fibromyalgia, thyroid cancer s/p resection, and asthma presenting to the ED after a suicide attempt.      Patient reports that she had been feeling an increase feeling of anxiety and depression. She had been feeling "down" and did not want her son to see her in her current state and decided to take pills in an attempt to kill herself. She views herself as a burden for her family and wants them to have a better life without her. After she had taken the pills she got sleepy, vomited, and then told her spouse to bring her to the hospital. She did not say that she had planned the attempt, and it was in the moment that she decided to take the pills. Patient also said that she only took 4 pills because she wasn't sure if she had really wanted to die. Patient endorsed symptoms of depression(guilt, increased appetite, decreased sleep, depressed mood, suicidality, hopelessness) starting around 2 years ago when she started to have procedures for her Thyroid cancer. Her son has autism, and feels as if she has a lot of responsibilities to handle between the health of her son and herself. Denies any symptoms of juan. Denies HI, NSSIB, or current SI.  Endorsed feelings of passive SI in the past.  Possible AH of family members calling out her middle name, as well as delusions of paranoia (people talking about her). No drug or alcohol use.            Per Spouse:      Patient has been feeling depressed roughly 1 and a half years ago during which she was going through procedures for her thyroid cancer. Reports that the depression has gotten worse starting a few months ago, patient would frequently cry and have panic attacks (can't breath, heart racing). Is still able to enjoy moments playing with her son, but primarily has been feeling down. She has often spoke about not wanting to live anymore to the spouse, but did not mention any specific plan. She has not been sleeping. Spouse reported that she was once in an outpatient therapy program but was unable to provide any information.      No concerns for child reported. Pt with hx of anxiety and depression. No prior psychiatric hospitalizations. Spouse reports past therapist (last in August) was supposed to send referral then later tx stopped by psychiatrist? Pt was prescribed medication for anxiety and depression. Pt now in  the last couple weeks has some moments of being all right and other moments a lot of anxiety, cries a lot, and thinks a lot of negative stuff. Spouse says she took 4 pills of anxiety medication. Spouse reports pt got medication from the crisis center about 3 weeks ago. Pt endorsing SI to spouse feels sick and doesn't want to live like that. Spouse reports pt has hit herself in the face when experiencing anxiety. Pt with difficulty sleeping. Pt is showering. Spouse reports that he is caring mostly for child. Pt has experienced AH in past. Spouse reports voice of her mom with nobody coming or voice of other family members. Pt is eating. No substance use. No reported HI intent or plan. No violence or aggression towards others. Pt with no access to firearms.       Patient is a 34 y/o  woman, she is  with a 4y/o, currently domiciled with her , unemployed and supported by her family, patient is Pashto speaker; that was admitted initially to the medical floor after she tried to commit suicide by OD on Wellbutrin. Patient has been medically clear and later on transfer to the unit 2W.       Patient reported that she started feeling depressed after she had her son. When the child was 3mo she was diagnosed with thyroid cancer and was told she was not going to be able to have more children. This was specially hard as she comes from a big family. Then she need to be in cancer treatment and her cancer metastasized. Patient then continue to struggle with a lot of pain, depression and anxiety. Now the cancer in on reemission but after her surgery she needed PT and SLP. She started to see a doctor vie telehealth that put her on Amitriptyline that was effective for some time. She then OD on this medication and the psychiatrist told her she needed to be in a higher level of care but she was not able to get her self into a PHP. She was then changed to Cymbalta and Wellbutrin prescribed by her PCP. She has continue to struggle with her pain, depression and anxiety. Her son was diagnosed with autism and this has been very hard on her.            Before coming to the hospital patient said that she was not able to cope with her symptoms of depression that she describes as feeling sad all the time, she is hopeless, helpless, doesn't sleep or eats. She has been experiencing SI and is overwhelmed. Patient decided to take an OD on her medications and then she talk to the  that brought her to the crisis center.       As per C&L note      33 year old female with PMH significant for depression, fibromyalgia, thyroid cancer s/p resection, and asthma presenting to the ED after a suicide attempt.      Patient reports that she had been feeling an increase feeling of anxiety and depression. She had been feeling "down" and did not want her son to see her in her current state and decided to take pills in an attempt to kill herself. She views herself as a burden for her family and wants them to have a better life without her. After she had taken the pills she got sleepy, vomited, and then told her spouse to bring her to the hospital. She did not say that she had planned the attempt, and it was in the moment that she decided to take the pills. Patient also said that she only took 4 pills because she wasn't sure if she had really wanted to die. Patient endorsed symptoms of depression(guilt, increased appetite, decreased sleep, depressed mood, suicidality, hopelessness) starting around 2 years ago when she started to have procedures for her Thyroid cancer. Her son has autism, and feels as if she has a lot of responsibilities to handle between the health of her son and herself. Denies any symptoms of juan. Denies HI, NSSIB, or current SI.  Endorsed feelings of passive SI in the past.  Possible AH of family members calling out her middle name, as well as delusions of paranoia (people talking about her). No drug or alcohol use.            Per Spouse:      Patient has been feeling depressed roughly 1 and a half years ago during which she was going through procedures for her thyroid cancer. Reports that the depression has gotten worse starting a few months ago, patient would frequently cry and have panic attacks (can't breath, heart racing). Is still able to enjoy moments playing with her son, but primarily has been feeling down. She has often spoke about not wanting to live anymore to the spouse, but did not mention any specific plan. She has not been sleeping. Spouse reported that she was once in an outpatient therapy program but was unable to provide any information.      No concerns for child reported. Pt with hx of anxiety and depression. No prior psychiatric hospitalizations. Spouse reports past therapist (last in August) was supposed to send referral then later tx stopped by psychiatrist? Pt was prescribed medication for anxiety and depression. Pt now in  the last couple weeks has some moments of being all right and other moments a lot of anxiety, cries a lot, and thinks a lot of negative stuff. Spouse says she took 4 pills of anxiety medication. Spouse reports pt got medication from the crisis center about 3 weeks ago. Pt endorsing SI to spouse feels sick and doesn't want to live like that. Spouse reports pt has hit herself in the face when experiencing anxiety. Pt with difficulty sleeping. Pt is showering. Spouse reports that he is caring mostly for child. Pt has experienced AH in past. Spouse reports voice of her mom with nobody coming or voice of other family members. Pt is eating. No substance use. No reported HI intent or plan. No violence or aggression towards others. Pt with no access to firearms.       Patient is a 34 y/o  woman, she is  with a 4y/o, currently domiciled with her , unemployed and supported by her family, patient is Belarusian speaker; that was admitted initially to the medical floor after she tried to commit suicide by OD on Wellbutrin. Patient has been medically clear and later on transfer to the unit 2W.       Patient reported that she started feeling depressed after she had her son. When the child was 3mo she was diagnosed with thyroid cancer and was told she was not going to be able to have more children. This was specially hard as she comes from a big family. Then she need to be in cancer treatment and her cancer metastasized. Patient then continue to struggle with a lot of pain, depression and anxiety. Now the cancer in on reemission but after her surgery she needed PT and SLP. She started to see a doctor vie telehealth that put her on Amitriptyline that was effective for some time. She then OD on this medication and the psychiatrist told her she needed to be in a higher level of care but she was not able to get her self into a PHP. She was then changed to Cymbalta and Wellbutrin prescribed by her PCP. She has continue to struggle with her pain, depression and anxiety. Her son was diagnosed with autism and this has been very hard on her.            Before coming to the hospital patient said that she was not able to cope with her symptoms of depression that she describes as feeling sad all the time, she is hopeless, helpless, doesn't sleep or eats. She has been experiencing SI and is overwhelmed. Patient decided to take an OD on her medications and then she talk to the  that brought her to the crisis center.       As per C&L note      33 year old female with PMH significant for depression, fibromyalgia, thyroid cancer s/p resection, and asthma presenting to the ED after a suicide attempt.      Patient reports that she had been feeling an increase feeling of anxiety and depression. She had been feeling "down" and did not want her son to see her in her current state and decided to take pills in an attempt to kill herself. She views herself as a burden for her family and wants them to have a better life without her. After she had taken the pills she got sleepy, vomited, and then told her spouse to bring her to the hospital. She did not say that she had planned the attempt, and it was in the moment that she decided to take the pills. Patient also said that she only took 4 pills because she wasn't sure if she had really wanted to die. Patient endorsed symptoms of depression(guilt, increased appetite, decreased sleep, depressed mood, suicidality, hopelessness) starting around 2 years ago when she started to have procedures for her Thyroid cancer. Her son has autism, and feels as if she has a lot of responsibilities to handle between the health of her son and herself. Denies any symptoms of juan. Denies HI, NSSIB, or current SI.  Endorsed feelings of passive SI in the past.  Possible AH of family members calling out her middle name, as well as delusions of paranoia (people talking about her). No drug or alcohol use.            Per Spouse:      Patient has been feeling depressed roughly 1 and a half years ago during which she was going through procedures for her thyroid cancer. Reports that the depression has gotten worse starting a few months ago, patient would frequently cry and have panic attacks (can't breath, heart racing). Is still able to enjoy moments playing with her son, but primarily has been feeling down. She has often spoke about not wanting to live anymore to the spouse, but did not mention any specific plan. She has not been sleeping. Spouse reported that she was once in an outpatient therapy program but was unable to provide any information.      No concerns for child reported. Pt with hx of anxiety and depression. No prior psychiatric hospitalizations. Spouse reports past therapist (last in August) was supposed to send referral then later tx stopped by psychiatrist? Pt was prescribed medication for anxiety and depression. Pt now in  the last couple weeks has some moments of being all right and other moments a lot of anxiety, cries a lot, and thinks a lot of negative stuff. Spouse says she took 4 pills of anxiety medication. Spouse reports pt got medication from the crisis center about 3 weeks ago. Pt endorsing SI to spouse feels sick and doesn't want to live like that. Spouse reports pt has hit herself in the face when experiencing anxiety. Pt with difficulty sleeping. Pt is showering. Spouse reports that he is caring mostly for child. Pt has experienced AH in past. Spouse reports voice of her mom with nobody coming or voice of other family members. Pt is eating. No substance use. No reported HI intent or plan. No violence or aggression towards others. Pt with no access to firearms.

## 2023-11-21 NOTE — BH INPATIENT PSYCHIATRY DISCHARGE NOTE - NSBHDCHANDOFFFT_PSY_ALL_CORE
[facility]  Ms. Bell [phone] provided with discharge summary, medication list, this writer's contact [phone] for any further questions or concerns  [facility]  Ms. Bell [phone] provided with discharge summary, medication list, this writer's contact [phone] for any further questions or concerns  [facility]  Ms. Bell [phone] provided with discharge summary, medication list, this writer's contact [phone] for any further questions or concerns

## 2023-11-21 NOTE — BH INPATIENT PSYCHIATRY PROGRESS NOTE - NSBHFUPINTERVALHXFT_PSY_A_CORE
Pt assessed for depression s/p SA via OD on wellbutrin. Chart reviewed and case discussed with treatment team. No interval events reported overnight. Pt seen with medical student and Dr. Dino bryan who also provided Algerian translation for the pt. Pt calm and cooperative on approach, pt reports generalized body pain making her "body feel heavy"  cymbalta to 40 mg PO QD for fibromyalgia pain. Pt denies further dizziness with the change of medication dosing times; reports improved asthma sxs with increase of asmaex to BID dosing. Denies active SIIP and agrees to come to staff immediately with safety concerns. Pt states rehab therapy was able to provide her DBT worksheets in Algerian. Pt reports coping mechanisms include reading the bible and coloring. Discussed dispo planning and pt states her family agrees she continues to require hospitalization at this time. Pt reports some urinary frequency and noted to have been started on ditropan by covering provider over the weekend (pt states she was on this in the past). UA and urine culture ordered - pending results Pt assessed for depression s/p SA via OD on wellbutrin. Chart reviewed and case discussed with treatment team. No interval events reported overnight. Pt seen with medical student and Dr. Dino bryan who also provided Beninese translation for the pt. Pt calm and cooperative on approach, pt reports generalized body pain making her "body feel heavy" and pt reports more feelings of depression because of the pain. Both zoloft and cymbalta were increased today. Pt requesting lidocaine patch for chronic hip and neck pain (ordered). Denies active SIIP and agrees to come to staff immediately with safety concerns. UA and urine culture ordered again - pending collection and results

## 2023-11-21 NOTE — BH INPATIENT PSYCHIATRY PROGRESS NOTE - PRN MEDS
MEDICATIONS  (PRN):  acetaminophen     Tablet .. 650 milliGRAM(s) Oral every 6 hours PRN Mild Pain (1 - 3)  albuterol    90 MICROgram(s) HFA Inhaler 2 Puff(s) Inhalation every 6 hours PRN asthma  hydrOXYzine hydrochloride 25 milliGRAM(s) Oral every 6 hours PRN Anxiety  ibuprofen  Tablet. 600 milliGRAM(s) Oral every 8 hours PRN Moderate Pain (4 - 6)  OLANZapine 5 milliGRAM(s) Oral every 6 hours PRN Severe agitation or psychosis  traZODone 50 milliGRAM(s) Oral at bedtime PRN Insomnia

## 2023-11-21 NOTE — BH INPATIENT PSYCHIATRY PROGRESS NOTE - NSBHCHARTREVIEWVS_PSY_A_CORE FT
Vital Signs Last 24 Hrs  T(C): 36.7 (11-21-23 @ 07:32), Max: 36.7 (11-21-23 @ 07:32)  T(F): 98 (11-21-23 @ 07:32), Max: 98 (11-21-23 @ 07:32)  HR: 78 (11-20-23 @ 20:33) (78 - 78)  BP: 116/72 (11-20-23 @ 20:33) (116/72 - 116/72)  BP(mean): --  RR: 17 (11-21-23 @ 07:32) (17 - 18)  SpO2: 100% (11-21-23 @ 07:32) (100% - 100%)    Orthostatic VS  11-21-23 @ 07:32  Lying BP: --/-- HR: --  Sitting BP: 118/57 HR: 79  Standing BP: 100/58 HR: 82  Site: --  Mode: --  Orthostatic VS  11-20-23 @ 07:53  Lying BP: --/-- HR: --  Sitting BP: 115/88 HR: 90  Standing BP: 120/72 HR: 105  Site: --  Mode: --

## 2023-11-21 NOTE — BH INPATIENT PSYCHIATRY DISCHARGE NOTE - NSBHDCMEDICALFT_PSY_A_CORE
Pt treated for UTI at [facility] prior to transfer  to [facility] Pt treated for UTI at [facility] prior to transfer  to [facility] Pt treated for UTI at [facility] prior to transfer  to [facility]

## 2023-11-21 NOTE — BH INPATIENT PSYCHIATRY DISCHARGE NOTE - NSBHMETABOLIC_PSY_ALL_CORE_FT
1458 BPCI Letter given to family member in room. BMI: BMI (kg/m2): 29.1 (11-14-23 @ 15:15)  HbA1c: A1C with Estimated Average Glucose Result: 5.1 % (11-15-23 @ 09:30)    Glucose:   BP: 116/72 (11-20-23 @ 20:33) (116/72 - 123/76)Vital Signs Last 24 Hrs  T(C): 36.7 (11-21-23 @ 07:32), Max: 36.7 (11-21-23 @ 07:32)  T(F): 98 (11-21-23 @ 07:32), Max: 98 (11-21-23 @ 07:32)  HR: 78 (11-20-23 @ 20:33) (78 - 78)  BP: 116/72 (11-20-23 @ 20:33) (116/72 - 116/72)  BP(mean): --  RR: 17 (11-21-23 @ 07:32) (17 - 18)  SpO2: 100% (11-21-23 @ 07:32) (100% - 100%)    Orthostatic VS  11-21-23 @ 07:32  Lying BP: --/-- HR: --  Sitting BP: 118/57 HR: 79  Standing BP: 100/58 HR: 82  Site: --  Mode: --  Orthostatic VS  11-20-23 @ 07:53  Lying BP: --/-- HR: --  Sitting BP: 115/88 HR: 90  Standing BP: 120/72 HR: 105  Site: --  Mode: --    Lipid Panel: Date/Time: 11-15-23 @ 09:30  Cholesterol, Serum: 201  LDL Cholesterol Calculated: 121  HDL Cholesterol, Serum: 47  Total Cholesterol/HDL Ration Measurement: --  Triglycerides, Serum: 165

## 2023-11-21 NOTE — BH INPATIENT PSYCHIATRY DISCHARGE NOTE - NSBHDCRISKMITIGATE_PSY_ALL_CORE
Safety planning/Referral to health home/Family/Other social support involvement/Medications targeting suicidality/non-suicidal self injurious behavior

## 2023-11-21 NOTE — BH INPATIENT PSYCHIATRY DISCHARGE NOTE - HPI (INCLUDE ILLNESS QUALITY, SEVERITY, DURATION, TIMING, CONTEXT, MODIFYING FACTORS, ASSOCIATED SIGNS AND SYMPTOMS)
Per Initial  assessment note: "Patient is a 32 y/o  woman, she is  with a 2y/o, currently domiciled with her , unemployed and supported by her family, patient is Thai speaker; that was admitted initially to the medical floor after she tried to commit suicide by OD on Wellbutrin. Patient has been medically clear and later on transfer to the unit 2W.     Today patient was seen and evaluated in the treatment room by her self, this was a face to face evaluation. Interview was conducted in Thai by this writer. Patient was very depressed, crying and she looks sad. She is a good historian and was able to give accurate information for this note. Patient's thought process is lineal and goal directed.     Patient reported that she started feeling depressed after she had her son. When the child was 3mo she was diagnosed with thyroid cancer and was told she was not going to be able to have more children. This was specially hard as she comes from a big family. Then she need to be in cancer treatment and her cancer metastasized. Patient then continue to struggle with a lot of pain, depression and anxiety. Now the cancer in on reemission but after her surgery she needed PT and SLP. She started to see a doctor vie telehealth that put her on Amitriptyline that was effective for some time. She then OD on this medication and the psychiatrist told her she needed to be in a higher level of care but she was not able to get her self into a PHP. She was then changed to Cymbalta and Wellbutrin prescribed by her PCP. She has continue to struggle with her pain, depression and anxiety. Her son was diagnosed with autism and this has been very hard on her.     Before coming to the hospital patient said that she was not able to cope with her symptoms of depression that she describes as feeling sad all the time, she is hopeless, helpless, doesn't sleep or eats. She has been experiencing SI and is overwhelmed. Patient decided to take an OD on her medications and then she talk to the  that brought her to the crisis center.     At the moment of the assessment patient said that she is feeling safe in the unit. She denies any active SI and has been able to contract for safety. Patient denies any HI or hallucinations and no delusions have been elicited. She is in agreement with staying voluntarily and work with us in a safe discharge plan.      I call patient's  as patient agreed, he was not available and I left a message.     As per C&L note   33 year old female with PMH significant for depression, fibromyalgia, thyroid cancer s/p resection, and asthma presenting to the ED after a suicide attempt.     Patient reports that she had been feeling an increase feeling of anxiety and depression. She had been feeling "down" and did not want her son to see her in her current state and decided to take pills in an attempt to kill herself. She views herself as a burden for her family and wants them to have a better life without her. After she had taken the pills she got sleepy, vomited, and then told her spouse to bring her to the hospital. She did not say that she had planned the attempt, and it was in the moment that she decided to take the pills. Patient also said that she only took 4 pills because she wasn't sure if she had really wanted to die. Patient endorsed symptoms of depression(guilt, increased appetite, decreased sleep, depressed mood, suicidality, hopelessness) starting around 2 years ago when she started to have procedures for her Thyroid cancer. Her son has autism, and feels as if she has a lot of responsibilities to handle between the health of her son and herself. Denies any symptoms of juan. Denies HI, NSSIB, or current SI.  Endorsed feelings of passive SI in the past.  Possible AH of family members calling out her middle name, as well as delusions of paranoia (people talking about her). No drug or alcohol use.     Per Spouse:   Patient has been feeling depressed roughly 1 and a half years ago during which she was going through procedures for her thyroid cancer. Reports that the depression has gotten worse starting a few months ago, patient would frequently cry and have panic attacks (can't breath, heart racing). Is still able to enjoy moments playing with her son, but primarily has been feeling down. She has often spoke about not wanting to live anymore to the spouse, but did not mention any specific plan. She has not been sleeping. Spouse reported that she was once in an outpatient therapy program, but was unable to provide any information. "     Per Initial  assessment note: "Patient is a 34 y/o  woman, she is  with a 4y/o, currently domiciled with her , unemployed and supported by her family, patient is Belarusian speaker; that was admitted initially to the medical floor after she tried to commit suicide by OD on Wellbutrin. Patient has been medically clear and later on transfer to the unit 2W.     Today patient was seen and evaluated in the treatment room by her self, this was a face to face evaluation. Interview was conducted in Belarusian by this writer. Patient was very depressed, crying and she looks sad. She is a good historian and was able to give accurate information for this note. Patient's thought process is lineal and goal directed.     Patient reported that she started feeling depressed after she had her son. When the child was 3mo she was diagnosed with thyroid cancer and was told she was not going to be able to have more children. This was specially hard as she comes from a big family. Then she need to be in cancer treatment and her cancer metastasized. Patient then continue to struggle with a lot of pain, depression and anxiety. Now the cancer in on reemission but after her surgery she needed PT and SLP. She started to see a doctor vie telehealth that put her on Amitriptyline that was effective for some time. She then OD on this medication and the psychiatrist told her she needed to be in a higher level of care but she was not able to get her self into a PHP. She was then changed to Cymbalta and Wellbutrin prescribed by her PCP. She has continue to struggle with her pain, depression and anxiety. Her son was diagnosed with autism and this has been very hard on her.     Before coming to the hospital patient said that she was not able to cope with her symptoms of depression that she describes as feeling sad all the time, she is hopeless, helpless, doesn't sleep or eats. She has been experiencing SI and is overwhelmed. Patient decided to take an OD on her medications and then she talk to the  that brought her to the crisis center.     At the moment of the assessment patient said that she is feeling safe in the unit. She denies any active SI and has been able to contract for safety. Patient denies any HI or hallucinations and no delusions have been elicited. She is in agreement with staying voluntarily and work with us in a safe discharge plan.      I call patient's  as patient agreed, he was not available and I left a message.     As per C&L note   33 year old female with PMH significant for depression, fibromyalgia, thyroid cancer s/p resection, and asthma presenting to the ED after a suicide attempt.     Patient reports that she had been feeling an increase feeling of anxiety and depression. She had been feeling "down" and did not want her son to see her in her current state and decided to take pills in an attempt to kill herself. She views herself as a burden for her family and wants them to have a better life without her. After she had taken the pills she got sleepy, vomited, and then told her spouse to bring her to the hospital. She did not say that she had planned the attempt, and it was in the moment that she decided to take the pills. Patient also said that she only took 4 pills because she wasn't sure if she had really wanted to die. Patient endorsed symptoms of depression(guilt, increased appetite, decreased sleep, depressed mood, suicidality, hopelessness) starting around 2 years ago when she started to have procedures for her Thyroid cancer. Her son has autism, and feels as if she has a lot of responsibilities to handle between the health of her son and herself. Denies any symptoms of juan. Denies HI, NSSIB, or current SI.  Endorsed feelings of passive SI in the past.  Possible AH of family members calling out her middle name, as well as delusions of paranoia (people talking about her). No drug or alcohol use.     Per Spouse:   Patient has been feeling depressed roughly 1 and a half years ago during which she was going through procedures for her thyroid cancer. Reports that the depression has gotten worse starting a few months ago, patient would frequently cry and have panic attacks (can't breath, heart racing). Is still able to enjoy moments playing with her son, but primarily has been feeling down. She has often spoke about not wanting to live anymore to the spouse, but did not mention any specific plan. She has not been sleeping. Spouse reported that she was once in an outpatient therapy program, but was unable to provide any information. "     Per Initial  assessment note: "Patient is a 34 y/o  woman, she is  with a 4y/o, currently domiciled with her , unemployed and supported by her family, patient is Icelandic speaker; that was admitted initially to the medical floor after she tried to commit suicide by OD on Wellbutrin. Patient has been medically clear and later on transfer to the unit 2W.     Today patient was seen and evaluated in the treatment room by her self, this was a face to face evaluation. Interview was conducted in Icelandic by this writer. Patient was very depressed, crying and she looks sad. She is a good historian and was able to give accurate information for this note. Patient's thought process is lineal and goal directed.     Patient reported that she started feeling depressed after she had her son. When the child was 3mo she was diagnosed with thyroid cancer and was told she was not going to be able to have more children. This was specially hard as she comes from a big family. Then she need to be in cancer treatment and her cancer metastasized. Patient then continue to struggle with a lot of pain, depression and anxiety. Now the cancer in on reemission but after her surgery she needed PT and SLP. She started to see a doctor vie telehealth that put her on Amitriptyline that was effective for some time. She then OD on this medication and the psychiatrist told her she needed to be in a higher level of care but she was not able to get her self into a PHP. She was then changed to Cymbalta and Wellbutrin prescribed by her PCP. She has continue to struggle with her pain, depression and anxiety. Her son was diagnosed with autism and this has been very hard on her.     Before coming to the hospital patient said that she was not able to cope with her symptoms of depression that she describes as feeling sad all the time, she is hopeless, helpless, doesn't sleep or eats. She has been experiencing SI and is overwhelmed. Patient decided to take an OD on her medications and then she talk to the  that brought her to the crisis center.     At the moment of the assessment patient said that she is feeling safe in the unit. She denies any active SI and has been able to contract for safety. Patient denies any HI or hallucinations and no delusions have been elicited. She is in agreement with staying voluntarily and work with us in a safe discharge plan.      I call patient's  as patient agreed, he was not available and I left a message.     As per C&L note   33 year old female with PMH significant for depression, fibromyalgia, thyroid cancer s/p resection, and asthma presenting to the ED after a suicide attempt.     Patient reports that she had been feeling an increase feeling of anxiety and depression. She had been feeling "down" and did not want her son to see her in her current state and decided to take pills in an attempt to kill herself. She views herself as a burden for her family and wants them to have a better life without her. After she had taken the pills she got sleepy, vomited, and then told her spouse to bring her to the hospital. She did not say that she had planned the attempt, and it was in the moment that she decided to take the pills. Patient also said that she only took 4 pills because she wasn't sure if she had really wanted to die. Patient endorsed symptoms of depression(guilt, increased appetite, decreased sleep, depressed mood, suicidality, hopelessness) starting around 2 years ago when she started to have procedures for her Thyroid cancer. Her son has autism, and feels as if she has a lot of responsibilities to handle between the health of her son and herself. Denies any symptoms of juan. Denies HI, NSSIB, or current SI.  Endorsed feelings of passive SI in the past.  Possible AH of family members calling out her middle name, as well as delusions of paranoia (people talking about her). No drug or alcohol use.     Per Spouse:   Patient has been feeling depressed roughly 1 and a half years ago during which she was going through procedures for her thyroid cancer. Reports that the depression has gotten worse starting a few months ago, patient would frequently cry and have panic attacks (can't breath, heart racing). Is still able to enjoy moments playing with her son, but primarily has been feeling down. She has often spoke about not wanting to live anymore to the spouse, but did not mention any specific plan. She has not been sleeping. Spouse reported that she was once in an outpatient therapy program, but was unable to provide any information. "

## 2023-11-21 NOTE — BH INPATIENT PSYCHIATRY PROGRESS NOTE - NSBHASSESSSUMMFT_PSY_ALL_CORE
Patient is a 34 y/o  woman, she is  with a 2y/o, currently domiciled with her , unemployed and supported by her family, patient is Beninese speaker; that was admitted initially to the medical floor after she tried to commit suicide by OD on Wellbutrin. Patient has been medically clear and later on transfer to the unit 2W.    Today, pt remains depressed though some improvements now that she is denying pain from HA, reports increase in anxiety over the weekend. Improved daytime sedation with change in medication dosing times. Improved asthma sxs with increase in asmaex dosing. Will increase zoloft to 75 mg PO QD for anxiety and Cymbalta to 40 mg PO QD for fibromyalgia pain       Plan:  1. Admit to the unit 2W on a voluntary status   2. Q 15 min checks   3. Medications      increase Zoloft to 100mg daily      d/c Gabapentin 100mg TID     Continue Prazosin 1 mg at bedtime   4. Medical team to evaluate the patient as needed.  5. SW to coordinate a safe discharge plan. Patient is a 34 y/o  woman, she is  with a 2y/o, currently domiciled with her , unemployed and supported by her family, patient is Irish speaker; that was admitted initially to the medical floor after she tried to commit suicide by OD on Wellbutrin. Patient has been medically clear and later on transfer to the unit 2W.    Today, pt reports increased depression today 2/2 chronic fibromyalgia pain. Will increase zoloft to 75 mg PO QD for anxiety and Cymbalta to 40 mg PO QD for fibromyalgia pain, start lidocaine patches for chronic hip and neck pain       Plan:  1. Admit to the unit 2W on a voluntary status   2. Q 15 min checks   3. Medications      increase Zoloft to 100mg daily      d/c Gabapentin 100mg TID     Continue Prazosin 1 mg at bedtime   4. Medical team to evaluate the patient as needed.  5. SW to coordinate a safe discharge plan.

## 2023-11-22 PROCEDURE — 99232 SBSQ HOSP IP/OBS MODERATE 35: CPT

## 2023-11-22 RX ORDER — ONDANSETRON 8 MG/1
4 TABLET, FILM COATED ORAL EVERY 8 HOURS
Refills: 0 | Status: DISCONTINUED | OUTPATIENT
Start: 2023-11-22 | End: 2023-12-13

## 2023-11-22 RX ORDER — DULOXETINE HYDROCHLORIDE 30 MG/1
60 CAPSULE, DELAYED RELEASE ORAL DAILY
Refills: 0 | Status: DISCONTINUED | OUTPATIENT
Start: 2023-11-22 | End: 2023-11-27

## 2023-11-22 RX ORDER — LIDOCAINE 4 G/100G
1 CREAM TOPICAL
Refills: 0 | Status: DISCONTINUED | OUTPATIENT
Start: 2023-11-22 | End: 2023-12-13

## 2023-11-22 RX ORDER — PANTOPRAZOLE SODIUM 20 MG/1
40 TABLET, DELAYED RELEASE ORAL
Refills: 0 | Status: DISCONTINUED | OUTPATIENT
Start: 2023-11-22 | End: 2023-12-13

## 2023-11-22 RX ADMIN — Medication 175 MICROGRAM(S): at 06:44

## 2023-11-22 RX ADMIN — Medication 25 MILLIGRAM(S): at 20:44

## 2023-11-22 RX ADMIN — SERTRALINE 75 MILLIGRAM(S): 25 TABLET, FILM COATED ORAL at 09:07

## 2023-11-22 RX ADMIN — Medication 30 MILLILITER(S): at 21:55

## 2023-11-22 RX ADMIN — Medication 1 MILLIGRAM(S): at 09:07

## 2023-11-22 RX ADMIN — Medication 5 MILLIGRAM(S): at 09:08

## 2023-11-22 RX ADMIN — ONDANSETRON 4 MILLIGRAM(S): 8 TABLET, FILM COATED ORAL at 12:49

## 2023-11-22 RX ADMIN — LIDOCAINE 1 APPLICATION(S): 4 CREAM TOPICAL at 21:54

## 2023-11-22 RX ADMIN — Medication 5 MILLIGRAM(S): at 20:43

## 2023-11-22 RX ADMIN — DULOXETINE HYDROCHLORIDE 40 MILLIGRAM(S): 30 CAPSULE, DELAYED RELEASE ORAL at 09:07

## 2023-11-22 RX ADMIN — Medication 30 MILLILITER(S): at 15:32

## 2023-11-22 RX ADMIN — MOMETASONE FUROATE 2 PUFF(S): 220 INHALANT RESPIRATORY (INHALATION) at 09:12

## 2023-11-22 RX ADMIN — NALTREXONE HYDROCHLORIDE 50 MILLIGRAM(S): 50 TABLET, FILM COATED ORAL at 09:07

## 2023-11-22 RX ADMIN — MOMETASONE FUROATE 2 PUFF(S): 220 INHALANT RESPIRATORY (INHALATION) at 20:44

## 2023-11-22 RX ADMIN — Medication 25 MILLIGRAM(S): at 20:45

## 2023-11-22 RX ADMIN — LIDOCAINE 2 PATCH: 4 CREAM TOPICAL at 09:08

## 2023-11-22 RX ADMIN — Medication 3 MILLIGRAM(S): at 20:43

## 2023-11-22 RX ADMIN — Medication 600 MILLIGRAM(S): at 17:24

## 2023-11-22 NOTE — BH INPATIENT PSYCHIATRY PROGRESS NOTE - CURRENT MEDICATION
MEDICATIONS  (STANDING):  DULoxetine 60 milliGRAM(s) Oral daily  folic acid 1 milliGRAM(s) Oral daily  levothyroxine 175 MICROGram(s) Oral daily  lidocaine 5% Ointment 1 Application(s) Topical two times a day  melatonin. 3 milliGRAM(s) Oral at bedtime  mometasone 220 MICROgram(s) Inhaler 2 Puff(s) Inhalation two times a day  naltrexone 50 milliGRAM(s) Oral daily  oxybutynin 5 milliGRAM(s) Oral two times a day  sertraline 75 milliGRAM(s) Oral daily  topiramate 25 milliGRAM(s) Oral at bedtime    MEDICATIONS  (PRN):  acetaminophen     Tablet .. 650 milliGRAM(s) Oral every 6 hours PRN Mild Pain (1 - 3)  albuterol    90 MICROgram(s) HFA Inhaler 2 Puff(s) Inhalation every 6 hours PRN asthma  hydrOXYzine hydrochloride 25 milliGRAM(s) Oral every 6 hours PRN Anxiety  ibuprofen  Tablet. 600 milliGRAM(s) Oral every 8 hours PRN Moderate Pain (4 - 6)  OLANZapine 5 milliGRAM(s) Oral every 6 hours PRN Severe agitation or psychosis  ondansetron    Tablet 4 milliGRAM(s) Oral every 8 hours PRN nausea  traZODone 50 milliGRAM(s) Oral at bedtime PRN Insomnia

## 2023-11-22 NOTE — BH INPATIENT PSYCHIATRY PROGRESS NOTE - NSBHCHARTREVIEWVS_PSY_A_CORE FT
Vital Signs Last 24 Hrs  T(C): 36.8 (11-22-23 @ 07:28), Max: 36.8 (11-22-23 @ 07:28)  T(F): 98.3 (11-22-23 @ 07:28), Max: 98.3 (11-22-23 @ 07:28)  HR: 84 (11-21-23 @ 19:54) (84 - 84)  BP: 123/73 (11-21-23 @ 19:54) (123/73 - 123/73)  BP(mean): --  RR: 18 (11-22-23 @ 07:28) (18 - 18)  SpO2: 99% (11-22-23 @ 07:28) (99% - 99%)    Orthostatic VS  11-22-23 @ 07:28  Lying BP: --/-- HR: --  Sitting BP: 113/62 HR: 81  Standing BP: 98/60 HR: 88  Site: --  Mode: --  Orthostatic VS  11-21-23 @ 07:32  Lying BP: --/-- HR: --  Sitting BP: 118/57 HR: 79  Standing BP: 100/58 HR: 82  Site: --  Mode: --

## 2023-11-22 NOTE — BH INPATIENT PSYCHIATRY PROGRESS NOTE - NSBHASSESSSUMMFT_PSY_ALL_CORE
Patient is a 34 y/o  woman, she is  with a 2y/o, currently domiciled with her , unemployed and supported by her family, patient is Croatian speaker; that was admitted initially to the medical floor after she tried to commit suicide by OD on Wellbutrin. Patient has been medically clear and later on transfer to the unit 2W.    Today, pt reports increased depression today 2/2 chronic pain. Will increase zoloft to 75 mg PO QD for anxiety and Cymbalta to 40 mg PO QD for fibromyalgia pain, start lidocaine ointment for chronic hip and neck pain. Start zofran PO PRN for nausea.      Plan:  1. Admit to the unit 2W on a voluntary status   2. Q 15 min checks   3. Medications      increase Zoloft to 100mg daily      d/c Gabapentin 100mg TID     Continue Prazosin 1 mg at bedtime   4. Medical team to evaluate the patient as needed.  5. SW to coordinate a safe discharge plan.

## 2023-11-22 NOTE — BH INPATIENT PSYCHIATRY PROGRESS NOTE - NSBHMETABOLIC_PSY_ALL_CORE_FT
BMI: BMI (kg/m2): 29.1 (11-14-23 @ 15:15)  HbA1c: A1C with Estimated Average Glucose Result: 5.1 % (11-15-23 @ 09:30)    Glucose:   BP: 123/73 (11-21-23 @ 19:54) (116/72 - 123/73)Vital Signs Last 24 Hrs  T(C): 36.8 (11-22-23 @ 07:28), Max: 36.8 (11-22-23 @ 07:28)  T(F): 98.3 (11-22-23 @ 07:28), Max: 98.3 (11-22-23 @ 07:28)  HR: 84 (11-21-23 @ 19:54) (84 - 84)  BP: 123/73 (11-21-23 @ 19:54) (123/73 - 123/73)  BP(mean): --  RR: 18 (11-22-23 @ 07:28) (18 - 18)  SpO2: 99% (11-22-23 @ 07:28) (99% - 99%)    Orthostatic VS  11-22-23 @ 07:28  Lying BP: --/-- HR: --  Sitting BP: 113/62 HR: 81  Standing BP: 98/60 HR: 88  Site: --  Mode: --  Orthostatic VS  11-21-23 @ 07:32  Lying BP: --/-- HR: --  Sitting BP: 118/57 HR: 79  Standing BP: 100/58 HR: 82  Site: --  Mode: --    Lipid Panel: Date/Time: 11-15-23 @ 09:30  Cholesterol, Serum: 201  LDL Cholesterol Calculated: 121  HDL Cholesterol, Serum: 47  Total Cholesterol/HDL Ration Measurement: --  Triglycerides, Serum: 165

## 2023-11-22 NOTE — BH INPATIENT PSYCHIATRY PROGRESS NOTE - NSBHFUPINTERVALHXFT_PSY_A_CORE
Pt assessed for depression s/p SA via OD on wellbutrin. Chart reviewed and case discussed with treatment team. No interval events reported overnight. Pt seen with  Dr. Dino bryan who also provided [place]an translation for the pt. Pt calm and cooperative on approach, pt reports generalized body pain is increasing her feelings of depression. Both zoloft and cymbalta were increased, will add lidocaine ointment. Pt reports nausea and PRN zofran ordered. Will d/c prazosin as pt reports this has been ineffective. Denies active SIIP and agrees to come to staff immediately with safety concerns. UA and urine culture ordered - pt has not yet given a sample.

## 2023-11-22 NOTE — BH INPATIENT PSYCHIATRY PROGRESS NOTE - PRN MEDS
MEDICATIONS  (PRN):  acetaminophen     Tablet .. 650 milliGRAM(s) Oral every 6 hours PRN Mild Pain (1 - 3)  albuterol    90 MICROgram(s) HFA Inhaler 2 Puff(s) Inhalation every 6 hours PRN asthma  hydrOXYzine hydrochloride 25 milliGRAM(s) Oral every 6 hours PRN Anxiety  ibuprofen  Tablet. 600 milliGRAM(s) Oral every 8 hours PRN Moderate Pain (4 - 6)  OLANZapine 5 milliGRAM(s) Oral every 6 hours PRN Severe agitation or psychosis  ondansetron    Tablet 4 milliGRAM(s) Oral every 8 hours PRN nausea  traZODone 50 milliGRAM(s) Oral at bedtime PRN Insomnia

## 2023-11-23 RX ADMIN — NALTREXONE HYDROCHLORIDE 50 MILLIGRAM(S): 50 TABLET, FILM COATED ORAL at 08:29

## 2023-11-23 RX ADMIN — Medication 600 MILLIGRAM(S): at 07:01

## 2023-11-23 RX ADMIN — Medication 3 MILLIGRAM(S): at 21:03

## 2023-11-23 RX ADMIN — Medication 25 MILLIGRAM(S): at 15:17

## 2023-11-23 RX ADMIN — PANTOPRAZOLE SODIUM 40 MILLIGRAM(S): 20 TABLET, DELAYED RELEASE ORAL at 08:28

## 2023-11-23 RX ADMIN — Medication 600 MILLIGRAM(S): at 07:30

## 2023-11-23 RX ADMIN — Medication 5 MILLIGRAM(S): at 21:04

## 2023-11-23 RX ADMIN — Medication 1 MILLIGRAM(S): at 08:29

## 2023-11-23 RX ADMIN — MOMETASONE FUROATE 2 PUFF(S): 220 INHALANT RESPIRATORY (INHALATION) at 21:04

## 2023-11-23 RX ADMIN — LIDOCAINE 1 APPLICATION(S): 4 CREAM TOPICAL at 09:06

## 2023-11-23 RX ADMIN — Medication 25 MILLIGRAM(S): at 21:03

## 2023-11-23 RX ADMIN — LIDOCAINE 1 APPLICATION(S): 4 CREAM TOPICAL at 21:30

## 2023-11-23 RX ADMIN — Medication 30 MILLILITER(S): at 09:53

## 2023-11-23 RX ADMIN — SERTRALINE 75 MILLIGRAM(S): 25 TABLET, FILM COATED ORAL at 08:29

## 2023-11-23 RX ADMIN — ONDANSETRON 4 MILLIGRAM(S): 8 TABLET, FILM COATED ORAL at 09:53

## 2023-11-23 RX ADMIN — DULOXETINE HYDROCHLORIDE 60 MILLIGRAM(S): 30 CAPSULE, DELAYED RELEASE ORAL at 08:29

## 2023-11-23 RX ADMIN — MOMETASONE FUROATE 2 PUFF(S): 220 INHALANT RESPIRATORY (INHALATION) at 08:32

## 2023-11-23 RX ADMIN — Medication 5 MILLIGRAM(S): at 08:29

## 2023-11-23 RX ADMIN — Medication 175 MICROGRAM(S): at 06:51

## 2023-11-24 PROCEDURE — 99232 SBSQ HOSP IP/OBS MODERATE 35: CPT

## 2023-11-24 RX ORDER — TOPIRAMATE 25 MG
50 TABLET ORAL AT BEDTIME
Refills: 0 | Status: DISCONTINUED | OUTPATIENT
Start: 2023-11-24 | End: 2023-12-13

## 2023-11-24 RX ORDER — SERTRALINE 25 MG/1
100 TABLET, FILM COATED ORAL DAILY
Refills: 0 | Status: DISCONTINUED | OUTPATIENT
Start: 2023-11-24 | End: 2023-12-11

## 2023-11-24 RX ADMIN — NALTREXONE HYDROCHLORIDE 50 MILLIGRAM(S): 50 TABLET, FILM COATED ORAL at 08:11

## 2023-11-24 RX ADMIN — MOMETASONE FUROATE 2 PUFF(S): 220 INHALANT RESPIRATORY (INHALATION) at 11:02

## 2023-11-24 RX ADMIN — LIDOCAINE 1 APPLICATION(S): 4 CREAM TOPICAL at 08:11

## 2023-11-24 RX ADMIN — Medication 50 MILLIGRAM(S): at 20:40

## 2023-11-24 RX ADMIN — Medication 1 MILLIGRAM(S): at 08:11

## 2023-11-24 RX ADMIN — PANTOPRAZOLE SODIUM 40 MILLIGRAM(S): 20 TABLET, DELAYED RELEASE ORAL at 08:11

## 2023-11-24 RX ADMIN — Medication 5 MILLIGRAM(S): at 08:11

## 2023-11-24 RX ADMIN — MOMETASONE FUROATE 2 PUFF(S): 220 INHALANT RESPIRATORY (INHALATION) at 20:41

## 2023-11-24 RX ADMIN — Medication 5 MILLIGRAM(S): at 20:40

## 2023-11-24 RX ADMIN — Medication 175 MICROGRAM(S): at 06:03

## 2023-11-24 RX ADMIN — SERTRALINE 75 MILLIGRAM(S): 25 TABLET, FILM COATED ORAL at 08:11

## 2023-11-24 RX ADMIN — LIDOCAINE 1 APPLICATION(S): 4 CREAM TOPICAL at 20:40

## 2023-11-24 RX ADMIN — Medication 3 MILLIGRAM(S): at 20:40

## 2023-11-24 RX ADMIN — DULOXETINE HYDROCHLORIDE 60 MILLIGRAM(S): 30 CAPSULE, DELAYED RELEASE ORAL at 08:11

## 2023-11-24 NOTE — BH DISCHARGE NOTE NURSING/SOCIAL WORK/PSYCH REHAB - NSDCPRGOAL_PSY_ALL_CORE
The patient presented to the ED after she attempted to commit suicide via overdose on Wellbutrin. The patient met her psychiatric rehabilitation goal to be identify thoughts and self-talk that contribute to depression for her depressive symptoms goal. The patient reported taking a bath, walking around, painting, and reading as her coping skills. The patient was compliant with her medications, engaged with her peers, and had good ADL’s and behavioral control. The patient denied SI/HI/AH/VH. The patient attended 86% of the Psychiatric Rehabilitation groups throughout her stay. Psychiatric Rehabilitation staff recommends that the patient engage in outpatient services for continued medication and symptom management.

## 2023-11-24 NOTE — BH INPATIENT PSYCHIATRY PROGRESS NOTE - NSBHCHARTREVIEWVS_PSY_A_CORE FT
Vital Signs Last 24 Hrs  T(C): 36.4 (11-24-23 @ 07:52), Max: 36.4 (11-24-23 @ 07:52)  T(F): 97.6 (11-24-23 @ 07:52), Max: 97.6 (11-24-23 @ 07:52)  HR: --  BP: --  BP(mean): --  RR: 17 (11-24-23 @ 07:52) (17 - 17)  SpO2: --    Orthostatic VS  11-24-23 @ 07:52  Lying BP: --/-- HR: --  Sitting BP: 119/71 HR: 72  Standing BP: 115/78 HR: 74  Site: --  Mode: --  Orthostatic VS  11-23-23 @ 07:45  Lying BP: --/-- HR: --  Sitting BP: 115/70 HR: 68  Standing BP: 104/66 HR: 88  Site: --  Mode: --

## 2023-11-24 NOTE — BH DISCHARGE NOTE NURSING/SOCIAL WORK/PSYCH REHAB - NSCDUDCCRISIS_PSY_A_CORE
[facility] Well  1 [phone]) [facility]-WELL ([phone])  Text "WELL" to 37091  Website: www.[employer]/.  [facility]  1 () [facility] (097-8428)/.  [facility]  [address] [place], [place] [address] [phone]   Hours:  Monday through Friday from 9 AM to 3 PM/988 [facility]  1 [phone]) [facility] [phone])  Text "WELL" to 59538  Website: www.[employer]/.  [facility]  1 (625) [facility] (034-3316)/.  [facility]  [address] [place], [place] [address] [phone]   Hours:  Monday through Friday from 9 AM to 3 PM/988 [facility]  1 (800) [facility] [phone])  Text "WELL" to 78568  Website: www.[employer]/.  [facility]  1 (905) [facility] (516-7304)/.  [facility]  [address] [place], [place] [address] [phone]   Hours:  Monday through Friday from 9 AM to 3 PM/988 [facility]

## 2023-11-24 NOTE — BH DISCHARGE NOTE NURSING/SOCIAL WORK/PSYCH REHAB - NSBHDCAGENCY1FT_PSY_A_CORE
[facility] for [facility] (VARUN)  Josiane Barajas [facility] for [facility] (VARUN)  Josiane Barajas [facility] for [facility] (VARUN)  Josiane Barajas

## 2023-11-24 NOTE — BH DISCHARGE NOTE NURSING/SOCIAL WORK/PSYCH REHAB - [facility]
Unit Name: 2 West                        Unit Phone Number: [phone] Unit Name: 2 West                        Unit Phone Number: [phone] Unit Name: 2 West                        Unit Phone Number: [phone]

## 2023-11-24 NOTE — BH INPATIENT PSYCHIATRY PROGRESS NOTE - NSBHASSESSSUMMFT_PSY_ALL_CORE
Patient is a 34 y/o  woman, she is  with a 4y/o, currently domiciled with her , unemployed and supported by her family, patient is Thai speaker; that was admitted initially to the medical floor after she tried to commit suicide by OD on Wellbutrin. Patient has been medically clear and later on transfer to the unit 2W.    Today, pt reports increased depression today 2/2 chronic pain. Will increase zoloft to 75 mg PO QD for anxiety and Cymbalta to 40 mg PO QD for fibromyalgia pain, start lidocaine ointment for chronic hip and neck pain. Start zofran PO PRN for nausea.      Plan:  1. Admit to the unit 2W on a voluntary status   2. Q 15 min checks   3. Medications      Increase Zoloft to 100mg at bedtime      Increase Topamax to 50 mg at bedtime      Continue Prazosin 1 mg at bedtime   4. Medical team to evaluate the patient as needed.  5. SW to coordinate a safe discharge plan.

## 2023-11-24 NOTE — BH INPATIENT PSYCHIATRY PROGRESS NOTE - NSBHMETABOLIC_PSY_ALL_CORE_FT
BMI: BMI (kg/m2): 29.1 (11-14-23 @ 15:15)  HbA1c: A1C with Estimated Average Glucose Result: 5.1 % (11-15-23 @ 09:30)    Glucose:   BP: 120/79 (11-22-23 @ 20:08) (120/79 - 123/73)Vital Signs Last 24 Hrs  T(C): 36.4 (11-24-23 @ 07:52), Max: 36.4 (11-24-23 @ 07:52)  T(F): 97.6 (11-24-23 @ 07:52), Max: 97.6 (11-24-23 @ 07:52)  HR: --  BP: --  BP(mean): --  RR: 17 (11-24-23 @ 07:52) (17 - 17)  SpO2: --    Orthostatic VS  11-24-23 @ 07:52  Lying BP: --/-- HR: --  Sitting BP: 119/71 HR: 72  Standing BP: 115/78 HR: 74  Site: --  Mode: --  Orthostatic VS  11-23-23 @ 07:45  Lying BP: --/-- HR: --  Sitting BP: 115/70 HR: 68  Standing BP: 104/66 HR: 88  Site: --  Mode: --    Lipid Panel: Date/Time: 11-15-23 @ 09:30  Cholesterol, Serum: 201  LDL Cholesterol Calculated: 121  HDL Cholesterol, Serum: 47  Total Cholesterol/HDL Ration Measurement: --  Triglycerides, Serum: 165

## 2023-11-24 NOTE — BH DISCHARGE NOTE NURSING/SOCIAL WORK/PSYCH REHAB - DISCHARGE INSTRUCTIONS AFTERCARE APPOINTMENTS
In order to check the location, date, or time of your aftercare appointment, please refer to your Discharge Instructions Document given to you upon leaving the hospital.  If you have lost the instructions please call [phone] In order to check the location, date, or time of your aftercare appointment, please refer to your Discharge Instructions Document given to you upon leaving the hospital.  If you have lost the instructions please call [phone] In order to check the location, date, or time of your aftercare appointment, please refer to your Discharge Instructions Document given to you upon leaving the hospital.  If you have lost the instructions please call [phone]

## 2023-11-24 NOTE — BH DISCHARGE NOTE NURSING/SOCIAL WORK/PSYCH REHAB - NSBHDCADDR1FT_A_CORE
[address]  [place], [place] [address] [address]  [place], [place] [address] [address]  [address]  [place], [place] [address]

## 2023-11-24 NOTE — BH DISCHARGE NOTE NURSING/SOCIAL WORK/PSYCH REHAB - NSDCINSTRUCTNUMBER_PSY_ALL_CORE
[facility]

## 2023-11-24 NOTE — BH INPATIENT PSYCHIATRY PROGRESS NOTE - CURRENT MEDICATION
MEDICATIONS  (STANDING):  DULoxetine 60 milliGRAM(s) Oral daily  folic acid 1 milliGRAM(s) Oral daily  levothyroxine 175 MICROGram(s) Oral daily  lidocaine 5% Ointment 1 Application(s) Topical two times a day  melatonin. 3 milliGRAM(s) Oral at bedtime  mometasone 220 MICROgram(s) Inhaler 2 Puff(s) Inhalation two times a day  naltrexone 50 milliGRAM(s) Oral daily  oxybutynin 5 milliGRAM(s) Oral two times a day  pantoprazole    Tablet 40 milliGRAM(s) Oral before breakfast  sertraline 100 milliGRAM(s) Oral daily  topiramate 50 milliGRAM(s) Oral at bedtime    MEDICATIONS  (PRN):  acetaminophen     Tablet .. 650 milliGRAM(s) Oral every 6 hours PRN Mild Pain (1 - 3)  albuterol    90 MICROgram(s) HFA Inhaler 2 Puff(s) Inhalation every 6 hours PRN asthma  aluminum hydroxide/magnesium hydroxide/simethicone Suspension 30 milliLiter(s) Oral every 6 hours PRN Dyspepsia  hydrOXYzine hydrochloride 25 milliGRAM(s) Oral every 6 hours PRN Anxiety  ibuprofen  Tablet. 600 milliGRAM(s) Oral every 8 hours PRN Moderate Pain (4 - 6)  OLANZapine 5 milliGRAM(s) Oral every 6 hours PRN Severe agitation or psychosis  ondansetron    Tablet 4 milliGRAM(s) Oral every 8 hours PRN nausea  traZODone 50 milliGRAM(s) Oral at bedtime PRN Insomnia

## 2023-11-24 NOTE — BH INPATIENT PSYCHIATRY PROGRESS NOTE - NSBHFUPINTERVALHXFT_PSY_A_CORE
Patient was seen and evaluated at bedside with Sw present during the assessment. Interview was conducted in Dutch by this writer. Patient reported that she is feeling the same as she continues to feel depressed with anxiety and pain. Patient stated that she wants to go home but not like this. She is not nauseous anymore and this has been helpful. She inquire about the medication changes and taking something for the anxiety. Patient was encourage to use her coping skills before taking any medication prn. I reminded the patient that we are in the process of increasing her medications and hopefully this will help. Patient verbalized good understanding and was in agreement with the plan.

## 2023-11-24 NOTE — BH DISCHARGE NOTE NURSING/SOCIAL WORK/PSYCH REHAB - PATIENT PORTAL LINK FT
You can access the FollowMyHealth Patient Portal offered by [facility] by registering at the following website: [email or web address]. By joining [employer]’s FollowMyHealth portal, you will also be able to view your health information using other applications (apps) compatible with our system. You can access the FollowMyHealth Patient Portal offered by [facility] by registering at the following website: [email or web address]. By joining [employer]’s FollowMyHealth portal, you will also be able to view your health information using other applications (apps) compatible with our system. You can access the FollowMyHealth Patient Portal offered by [facility] by registering at the following website: [email or web address]. By joining [employer]’s FollowMyHealth portal, you will also be able to view your health information using other applications (apps) compatible with our system.

## 2023-11-24 NOTE — BH INPATIENT PSYCHIATRY PROGRESS NOTE - PRN MEDS
MEDICATIONS  (PRN):  acetaminophen     Tablet .. 650 milliGRAM(s) Oral every 6 hours PRN Mild Pain (1 - 3)  albuterol    90 MICROgram(s) HFA Inhaler 2 Puff(s) Inhalation every 6 hours PRN asthma  aluminum hydroxide/magnesium hydroxide/simethicone Suspension 30 milliLiter(s) Oral every 6 hours PRN Dyspepsia  hydrOXYzine hydrochloride 25 milliGRAM(s) Oral every 6 hours PRN Anxiety  ibuprofen  Tablet. 600 milliGRAM(s) Oral every 8 hours PRN Moderate Pain (4 - 6)  OLANZapine 5 milliGRAM(s) Oral every 6 hours PRN Severe agitation or psychosis  ondansetron    Tablet 4 milliGRAM(s) Oral every 8 hours PRN nausea  traZODone 50 milliGRAM(s) Oral at bedtime PRN Insomnia

## 2023-11-25 RX ADMIN — DULOXETINE HYDROCHLORIDE 60 MILLIGRAM(S): 30 CAPSULE, DELAYED RELEASE ORAL at 08:16

## 2023-11-25 RX ADMIN — LIDOCAINE 1 APPLICATION(S): 4 CREAM TOPICAL at 08:51

## 2023-11-25 RX ADMIN — Medication 50 MILLIGRAM(S): at 20:36

## 2023-11-25 RX ADMIN — Medication 5 MILLIGRAM(S): at 08:17

## 2023-11-25 RX ADMIN — SERTRALINE 100 MILLIGRAM(S): 25 TABLET, FILM COATED ORAL at 08:16

## 2023-11-25 RX ADMIN — Medication 5 MILLIGRAM(S): at 20:36

## 2023-11-25 RX ADMIN — PANTOPRAZOLE SODIUM 40 MILLIGRAM(S): 20 TABLET, DELAYED RELEASE ORAL at 08:17

## 2023-11-25 RX ADMIN — Medication 3 MILLIGRAM(S): at 20:36

## 2023-11-25 RX ADMIN — Medication 175 MICROGRAM(S): at 06:41

## 2023-11-25 RX ADMIN — LIDOCAINE 1 APPLICATION(S): 4 CREAM TOPICAL at 20:52

## 2023-11-25 RX ADMIN — Medication 1 MILLIGRAM(S): at 08:17

## 2023-11-25 RX ADMIN — NALTREXONE HYDROCHLORIDE 50 MILLIGRAM(S): 50 TABLET, FILM COATED ORAL at 08:17

## 2023-11-25 RX ADMIN — MOMETASONE FUROATE 2 PUFF(S): 220 INHALANT RESPIRATORY (INHALATION) at 20:52

## 2023-11-25 RX ADMIN — MOMETASONE FUROATE 2 PUFF(S): 220 INHALANT RESPIRATORY (INHALATION) at 08:51

## 2023-11-26 RX ADMIN — Medication 50 MILLIGRAM(S): at 20:45

## 2023-11-26 RX ADMIN — Medication 1 MILLIGRAM(S): at 08:40

## 2023-11-26 RX ADMIN — MOMETASONE FUROATE 2 PUFF(S): 220 INHALANT RESPIRATORY (INHALATION) at 08:39

## 2023-11-26 RX ADMIN — PANTOPRAZOLE SODIUM 40 MILLIGRAM(S): 20 TABLET, DELAYED RELEASE ORAL at 08:40

## 2023-11-26 RX ADMIN — MOMETASONE FUROATE 2 PUFF(S): 220 INHALANT RESPIRATORY (INHALATION) at 20:49

## 2023-11-26 RX ADMIN — Medication 5 MILLIGRAM(S): at 20:46

## 2023-11-26 RX ADMIN — LIDOCAINE 1 APPLICATION(S): 4 CREAM TOPICAL at 08:59

## 2023-11-26 RX ADMIN — Medication 5 MILLIGRAM(S): at 08:40

## 2023-11-26 RX ADMIN — ALBUTEROL 2 PUFF(S): 90 AEROSOL, METERED ORAL at 20:45

## 2023-11-26 RX ADMIN — SERTRALINE 100 MILLIGRAM(S): 25 TABLET, FILM COATED ORAL at 08:40

## 2023-11-26 RX ADMIN — NALTREXONE HYDROCHLORIDE 50 MILLIGRAM(S): 50 TABLET, FILM COATED ORAL at 08:40

## 2023-11-26 RX ADMIN — DULOXETINE HYDROCHLORIDE 60 MILLIGRAM(S): 30 CAPSULE, DELAYED RELEASE ORAL at 08:40

## 2023-11-26 RX ADMIN — Medication 175 MICROGRAM(S): at 06:25

## 2023-11-26 RX ADMIN — LIDOCAINE 1 APPLICATION(S): 4 CREAM TOPICAL at 20:50

## 2023-11-26 RX ADMIN — Medication 3 MILLIGRAM(S): at 20:45

## 2023-11-27 PROCEDURE — 99232 SBSQ HOSP IP/OBS MODERATE 35: CPT

## 2023-11-27 RX ORDER — DULOXETINE HYDROCHLORIDE 30 MG/1
90 CAPSULE, DELAYED RELEASE ORAL DAILY
Refills: 0 | Status: DISCONTINUED | OUTPATIENT
Start: 2023-11-27 | End: 2023-12-04

## 2023-11-27 RX ADMIN — Medication 3 MILLIGRAM(S): at 20:59

## 2023-11-27 RX ADMIN — Medication 50 MILLIGRAM(S): at 21:00

## 2023-11-27 RX ADMIN — NALTREXONE HYDROCHLORIDE 50 MILLIGRAM(S): 50 TABLET, FILM COATED ORAL at 08:21

## 2023-11-27 RX ADMIN — DULOXETINE HYDROCHLORIDE 60 MILLIGRAM(S): 30 CAPSULE, DELAYED RELEASE ORAL at 08:21

## 2023-11-27 RX ADMIN — PANTOPRAZOLE SODIUM 40 MILLIGRAM(S): 20 TABLET, DELAYED RELEASE ORAL at 08:20

## 2023-11-27 RX ADMIN — Medication 50 MILLIGRAM(S): at 20:59

## 2023-11-27 RX ADMIN — SERTRALINE 100 MILLIGRAM(S): 25 TABLET, FILM COATED ORAL at 08:21

## 2023-11-27 RX ADMIN — MOMETASONE FUROATE 2 PUFF(S): 220 INHALANT RESPIRATORY (INHALATION) at 21:00

## 2023-11-27 RX ADMIN — Medication 5 MILLIGRAM(S): at 20:59

## 2023-11-27 RX ADMIN — Medication 1 MILLIGRAM(S): at 08:20

## 2023-11-27 RX ADMIN — Medication 650 MILLIGRAM(S): at 12:29

## 2023-11-27 RX ADMIN — Medication 175 MICROGRAM(S): at 06:20

## 2023-11-27 RX ADMIN — LIDOCAINE 1 APPLICATION(S): 4 CREAM TOPICAL at 12:34

## 2023-11-27 RX ADMIN — Medication 5 MILLIGRAM(S): at 08:20

## 2023-11-27 NOTE — BH INPATIENT PSYCHIATRY PROGRESS NOTE - NSBHCHARTREVIEWVS_PSY_A_CORE FT
Vital Signs Last 24 Hrs  T(C): 36.4 (11-27-23 @ 08:09), Max: 36.4 (11-27-23 @ 08:09)  T(F): 97.6 (11-27-23 @ 08:09), Max: 97.6 (11-27-23 @ 08:09)  HR: --  BP: --  BP(mean): --  RR: 17 (11-27-23 @ 08:09) (17 - 17)  SpO2: 100% (11-27-23 @ 08:09) (100% - 100%)    Orthostatic VS  11-27-23 @ 08:09  Lying BP: --/-- HR: --  Sitting BP: 107/58 HR: 77  Standing BP: 106/57 HR: 80  Site: --  Mode: --  Orthostatic VS  11-26-23 @ 08:16  Lying BP: --/-- HR: --  Sitting BP: 118/74 HR: 68  Standing BP: 102/69 HR: 89  Site: --  Mode: --

## 2023-11-27 NOTE — BH INPATIENT PSYCHIATRY PROGRESS NOTE - CURRENT MEDICATION
MEDICATIONS  (STANDING):  DULoxetine 90 milliGRAM(s) Oral daily  folic acid 1 milliGRAM(s) Oral daily  levothyroxine 175 MICROGram(s) Oral daily  lidocaine 5% Ointment 1 Application(s) Topical two times a day  melatonin. 3 milliGRAM(s) Oral at bedtime  mometasone 220 MICROgram(s) Inhaler 2 Puff(s) Inhalation two times a day  naltrexone 50 milliGRAM(s) Oral daily  oxybutynin 5 milliGRAM(s) Oral two times a day  pantoprazole    Tablet 40 milliGRAM(s) Oral before breakfast  sertraline 100 milliGRAM(s) Oral daily  topiramate 50 milliGRAM(s) Oral at bedtime    MEDICATIONS  (PRN):  acetaminophen     Tablet .. 650 milliGRAM(s) Oral every 6 hours PRN Mild Pain (1 - 3)  albuterol    90 MICROgram(s) HFA Inhaler 2 Puff(s) Inhalation every 6 hours PRN asthma  aluminum hydroxide/magnesium hydroxide/simethicone Suspension 30 milliLiter(s) Oral every 6 hours PRN Dyspepsia  hydrOXYzine hydrochloride 25 milliGRAM(s) Oral every 6 hours PRN Anxiety  ibuprofen  Tablet. 600 milliGRAM(s) Oral every 8 hours PRN Moderate Pain (4 - 6)  OLANZapine 5 milliGRAM(s) Oral every 6 hours PRN Severe agitation or psychosis  ondansetron    Tablet 4 milliGRAM(s) Oral every 8 hours PRN nausea  traZODone 50 milliGRAM(s) Oral at bedtime PRN Insomnia

## 2023-11-27 NOTE — BH INPATIENT PSYCHIATRY PROGRESS NOTE - NSBHMETABOLIC_PSY_ALL_CORE_FT
BMI: BMI (kg/m2): 29.1 (11-14-23 @ 15:15)  HbA1c: A1C with Estimated Average Glucose Result: 5.1 % (11-15-23 @ 09:30)    Glucose:   BP: --Vital Signs Last 24 Hrs  T(C): 36.4 (11-27-23 @ 08:09), Max: 36.4 (11-27-23 @ 08:09)  T(F): 97.6 (11-27-23 @ 08:09), Max: 97.6 (11-27-23 @ 08:09)  HR: --  BP: --  BP(mean): --  RR: 17 (11-27-23 @ 08:09) (17 - 17)  SpO2: 100% (11-27-23 @ 08:09) (100% - 100%)    Orthostatic VS  11-27-23 @ 08:09  Lying BP: --/-- HR: --  Sitting BP: 107/58 HR: 77  Standing BP: 106/57 HR: 80  Site: --  Mode: --  Orthostatic VS  11-26-23 @ 08:16  Lying BP: --/-- HR: --  Sitting BP: 118/74 HR: 68  Standing BP: 102/69 HR: 89  Site: --  Mode: --    Lipid Panel: Date/Time: 11-15-23 @ 09:30  Cholesterol, Serum: 201  LDL Cholesterol Calculated: 121  HDL Cholesterol, Serum: 47  Total Cholesterol/HDL Ration Measurement: --  Triglycerides, Serum: 165

## 2023-11-27 NOTE — BH INPATIENT PSYCHIATRY PROGRESS NOTE - NSBHFUPINTERVALHXFT_PSY_A_CORE
Pt assessed for depression s/p SA via OD on wellbutrin. Chart reviewed and case discussed with treatment team. No interval events reported overnight. Pt seen with medical student and Dr. Dino bryan who also provided Micronesian translation for the pt. Pt calm and cooperative on approach, pt reports generalized body pain is increasing her feelings of depression and sadness. Denies active SIIP and agrees to come to staff immediately with safety concerns. Denies HIIP, A/VH, or paranoia.  Pt agrees to further increase in cymbalta to 90 mg PO QD to help with both pain and depression

## 2023-11-27 NOTE — BH INPATIENT PSYCHIATRY PROGRESS NOTE - NSBHASSESSSUMMFT_PSY_ALL_CORE
Patient is a 32 y/o  woman, she is  with a 4y/o, currently domiciled with her , unemployed and supported by her family, patient is Portuguese speaker; that was admitted initially to the medical floor after she tried to commit suicide by OD on Wellbutrin. Patient has been medically clear and later on transfer to the unit 2W.    Today, pt reports continued sadness and depression today 2/2 chronic pain. Will increase Cymbalta to 90 mg PO QD     Plan:  1. Admit to the unit 2W on a voluntary status   2. Q 15 min checks   3. Medications      increase Zoloft to 100mg daily      d/c Gabapentin 100mg TID     d/c Prazosin 1 mg at bedtime      increase topamax to 50 mg PO QHS   4. Medical team to evaluate the patient as needed.  5. SW to coordinate a safe discharge plan.

## 2023-11-28 PROCEDURE — 99232 SBSQ HOSP IP/OBS MODERATE 35: CPT

## 2023-11-28 RX ADMIN — Medication 1 MILLIGRAM(S): at 09:04

## 2023-11-28 RX ADMIN — LIDOCAINE 1 APPLICATION(S): 4 CREAM TOPICAL at 09:03

## 2023-11-28 RX ADMIN — MOMETASONE FUROATE 2 PUFF(S): 220 INHALANT RESPIRATORY (INHALATION) at 09:03

## 2023-11-28 RX ADMIN — DULOXETINE HYDROCHLORIDE 90 MILLIGRAM(S): 30 CAPSULE, DELAYED RELEASE ORAL at 09:04

## 2023-11-28 RX ADMIN — MOMETASONE FUROATE 2 PUFF(S): 220 INHALANT RESPIRATORY (INHALATION) at 20:27

## 2023-11-28 RX ADMIN — Medication 5 MILLIGRAM(S): at 20:27

## 2023-11-28 RX ADMIN — NALTREXONE HYDROCHLORIDE 50 MILLIGRAM(S): 50 TABLET, FILM COATED ORAL at 09:03

## 2023-11-28 RX ADMIN — Medication 3 MILLIGRAM(S): at 20:27

## 2023-11-28 RX ADMIN — Medication 50 MILLIGRAM(S): at 20:27

## 2023-11-28 RX ADMIN — LIDOCAINE 1 APPLICATION(S): 4 CREAM TOPICAL at 20:27

## 2023-11-28 RX ADMIN — SERTRALINE 100 MILLIGRAM(S): 25 TABLET, FILM COATED ORAL at 09:04

## 2023-11-28 RX ADMIN — PANTOPRAZOLE SODIUM 40 MILLIGRAM(S): 20 TABLET, DELAYED RELEASE ORAL at 09:03

## 2023-11-28 RX ADMIN — Medication 5 MILLIGRAM(S): at 09:04

## 2023-11-28 RX ADMIN — Medication 175 MICROGRAM(S): at 06:32

## 2023-11-28 NOTE — BH INPATIENT PSYCHIATRY PROGRESS NOTE - NSBHMETABOLIC_PSY_ALL_CORE_FT
BMI: BMI (kg/m2): 29.1 (11-14-23 @ 15:15)  HbA1c: A1C with Estimated Average Glucose Result: 5.1 % (11-15-23 @ 09:30)    Glucose:   BP: --Vital Signs Last 24 Hrs  T(C): 36.4 (11-28-23 @ 07:41), Max: 36.4 (11-28-23 @ 07:41)  T(F): 97.5 (11-28-23 @ 07:41), Max: 97.5 (11-28-23 @ 07:41)  HR: --  BP: --  BP(mean): --  RR: 17 (11-28-23 @ 07:41) (17 - 17)  SpO2: --    Orthostatic VS  11-28-23 @ 07:41  Lying BP: --/-- HR: --  Sitting BP: 111/72 HR: 79  Standing BP: 99/70 HR: 98  Site: --  Mode: --  Orthostatic VS  11-27-23 @ 08:09  Lying BP: --/-- HR: --  Sitting BP: 107/58 HR: 77  Standing BP: 106/57 HR: 80  Site: --  Mode: --    Lipid Panel: Date/Time: 11-15-23 @ 09:30  Cholesterol, Serum: 201  LDL Cholesterol Calculated: 121  HDL Cholesterol, Serum: 47  Total Cholesterol/HDL Ration Measurement: --  Triglycerides, Serum: 165

## 2023-11-28 NOTE — BH INPATIENT PSYCHIATRY PROGRESS NOTE - NSBHFUPINTERVALHXFT_PSY_A_CORE
Pt assessed for depression s/p SA via OD on wellbutrin. Chart reviewed and case discussed with treatment team. No interval events reported overnight. Pt seen with medical student and Dr. Dino bryan who also provided Tristanian translation for the pt. Pt calm and cooperative on approach, pt reports continued generalized body pain is increasing her feelings of depression and sadness. Denies active SIIP and agrees to come to staff immediately with safety concerns. Denies HIIP, A/VH, or paranoia.  Pt agrees to further increase in cymbalta to 90 mg PO QD to help with both pain and depression. [facility] [phone] and they confirmed: asmaex inhaler BID, wellbutrin 150 XL PO QD, levothyroxine 175 mcg PO QD, lyrica 150 mg PO BID, cymbalta 20 mg PO QD, abilify 2 mg PO QD x 14 days in august, atarax 50 mg PO PRN TID x 14 days in august

## 2023-11-28 NOTE — BH INPATIENT PSYCHIATRY PROGRESS NOTE - NSBHCHARTREVIEWVS_PSY_A_CORE FT
Vital Signs Last 24 Hrs  T(C): 36.4 (11-28-23 @ 07:41), Max: 36.4 (11-28-23 @ 07:41)  T(F): 97.5 (11-28-23 @ 07:41), Max: 97.5 (11-28-23 @ 07:41)  HR: --  BP: --  BP(mean): --  RR: 17 (11-28-23 @ 07:41) (17 - 17)  SpO2: --    Orthostatic VS  11-28-23 @ 07:41  Lying BP: --/-- HR: --  Sitting BP: 111/72 HR: 79  Standing BP: 99/70 HR: 98  Site: --  Mode: --  Orthostatic VS  11-27-23 @ 08:09  Lying BP: --/-- HR: --  Sitting BP: 107/58 HR: 77  Standing BP: 106/57 HR: 80  Site: --  Mode: --

## 2023-11-28 NOTE — BH INPATIENT PSYCHIATRY PROGRESS NOTE - NSBHASSESSSUMMFT_PSY_ALL_CORE
Patient is a 32 y/o  woman, she is  with a 4y/o, currently domiciled with her , unemployed and supported by her family, patient is Georgian speaker; that was admitted initially to the medical floor after she tried to commit suicide by OD on Wellbutrin. Patient has been medically clear and later on transfer to the unit 2W.    Today, pt reports continued sadness and depression today 2/2 chronic pain. Will increase Cymbalta to 90 mg PO QD. PT consult for chronic pain requested      Plan:  1. Admit to the unit 2W on a voluntary status   2. Q 15 min checks   3. Medications      increase Zoloft to 100mg daily      d/c Gabapentin 100mg TID     d/c Prazosin 1 mg at bedtime      increase topamax to 50 mg PO QHS   4. Medical team to evaluate the patient as needed.  5. SW to coordinate a safe discharge plan.

## 2023-11-28 NOTE — BH INPATIENT PSYCHIATRY PROGRESS NOTE - NSBHATTESTBILLING_PSY_A_CORE
Caller: Floresita Benitez    Relationship: Mother    Best call back number: [phone]    What is the best time to reach you: ANYTIME    Who are you requesting to speak with (clinical staff, provider,  specific staff member): CLINICAL    What was the call regarding: STATES THAT PHARMACY HAD INFORMED HER THAT SHE IS NOT ABLE TO FILL LAMOTRAGINE, WANTED TO SEEK ASSISTANCE IN DOING SO.     [id number] OBS or IP - moderate complexity OR 35-49 mins

## 2023-11-29 PROCEDURE — 99232 SBSQ HOSP IP/OBS MODERATE 35: CPT

## 2023-11-29 RX ADMIN — MOMETASONE FUROATE 2 PUFF(S): 220 INHALANT RESPIRATORY (INHALATION) at 09:18

## 2023-11-29 RX ADMIN — Medication 650 MILLIGRAM(S): at 09:19

## 2023-11-29 RX ADMIN — Medication 175 MICROGRAM(S): at 06:49

## 2023-11-29 RX ADMIN — Medication 5 MILLIGRAM(S): at 09:20

## 2023-11-29 RX ADMIN — NALTREXONE HYDROCHLORIDE 50 MILLIGRAM(S): 50 TABLET, FILM COATED ORAL at 09:19

## 2023-11-29 RX ADMIN — PANTOPRAZOLE SODIUM 40 MILLIGRAM(S): 20 TABLET, DELAYED RELEASE ORAL at 09:20

## 2023-11-29 RX ADMIN — SERTRALINE 100 MILLIGRAM(S): 25 TABLET, FILM COATED ORAL at 09:19

## 2023-11-29 RX ADMIN — Medication 1 MILLIGRAM(S): at 09:19

## 2023-11-29 RX ADMIN — Medication 50 MILLIGRAM(S): at 20:46

## 2023-11-29 RX ADMIN — LIDOCAINE 1 APPLICATION(S): 4 CREAM TOPICAL at 20:48

## 2023-11-29 RX ADMIN — Medication 5 MILLIGRAM(S): at 20:46

## 2023-11-29 RX ADMIN — DULOXETINE HYDROCHLORIDE 90 MILLIGRAM(S): 30 CAPSULE, DELAYED RELEASE ORAL at 09:19

## 2023-11-29 RX ADMIN — Medication 3 MILLIGRAM(S): at 21:07

## 2023-11-29 RX ADMIN — LIDOCAINE 1 APPLICATION(S): 4 CREAM TOPICAL at 09:20

## 2023-11-29 RX ADMIN — MOMETASONE FUROATE 2 PUFF(S): 220 INHALANT RESPIRATORY (INHALATION) at 20:46

## 2023-11-29 NOTE — BH INPATIENT PSYCHIATRY PROGRESS NOTE - NSBHCHARTREVIEWVS_PSY_A_CORE FT
Vital Signs Last 24 Hrs  T(C): 36.9 (11-29-23 @ 07:34), Max: 36.9 (11-29-23 @ 07:34)  T(F): 98.4 (11-29-23 @ 07:34), Max: 98.4 (11-29-23 @ 07:34)  HR: --  BP: --  BP(mean): --  RR: 18 (11-29-23 @ 07:34) (18 - 18)  SpO2: --    Orthostatic VS  11-29-23 @ 07:34  Lying BP: --/-- HR: --  Sitting BP: 113/63 HR: 74  Standing BP: 105/65 HR: 91  Site: --  Mode: --  Orthostatic VS  11-28-23 @ 07:41  Lying BP: --/-- HR: --  Sitting BP: 111/72 HR: 79  Standing BP: 99/70 HR: 98  Site: --  Mode: --

## 2023-11-29 NOTE — BH INPATIENT PSYCHIATRY PROGRESS NOTE - NSBHASSESSSUMMFT_PSY_ALL_CORE
The patient is Stable - Low risk of patient condition declining or worsening    Shift Goals  Clinical Goals: pain control  Patient Goals: rest  Family Goals: get her better    Progress made toward(s) clinical / shift goals:  Pain controled per MAR. Drains assessment frequently.      Problem: Pain - Standard  Goal: Alleviation of pain or a reduction in pain to the patient’s comfort goal  Outcome: Progressing     Problem: Skin Integrity  Goal: Skin integrity is maintained or improved  Outcome: Progressing       Patient is not progressing towards the following goals:       Patient is a 34 y/o  woman, she is  with a 2y/o, currently domiciled with her , unemployed and supported by her family, patient is Wolof speaker; that was admitted initially to the medical floor after she tried to commit suicide by OD on Wellbutrin. Patient has been medically clear and later on transfer to the unit 2W.    Today, pt reports slight improvement in depression today, c/o chronic pain, PT consult appreciated - pt states exercises for neck pain are helpful. Will c/w increase Cymbalta to 90 mg PO QD.     Plan:  1. Admit to the unit 2W on a voluntary status   2. Q 15 min checks   3. Medications      increase Zoloft to 100mg daily      d/c Gabapentin 100mg TID     d/c Prazosin 1 mg at bedtime      increase topamax to 50 mg PO QHS   4. Medical team to evaluate the patient as needed.  5. SW to coordinate a safe discharge plan.

## 2023-11-29 NOTE — BH INPATIENT PSYCHIATRY PROGRESS NOTE - NSBHMETABOLIC_PSY_ALL_CORE_FT
BMI: BMI (kg/m2): 29.1 (11-14-23 @ 15:15)  HbA1c: A1C with Estimated Average Glucose Result: 5.1 % (11-15-23 @ 09:30)    Glucose:   BP: --Vital Signs Last 24 Hrs  T(C): 36.9 (11-29-23 @ 07:34), Max: 36.9 (11-29-23 @ 07:34)  T(F): 98.4 (11-29-23 @ 07:34), Max: 98.4 (11-29-23 @ 07:34)  HR: --  BP: --  BP(mean): --  RR: 18 (11-29-23 @ 07:34) (18 - 18)  SpO2: --    Orthostatic VS  11-29-23 @ 07:34  Lying BP: --/-- HR: --  Sitting BP: 113/63 HR: 74  Standing BP: 105/65 HR: 91  Site: --  Mode: --  Orthostatic VS  11-28-23 @ 07:41  Lying BP: --/-- HR: --  Sitting BP: 111/72 HR: 79  Standing BP: 99/70 HR: 98  Site: --  Mode: --    Lipid Panel: Date/Time: 11-15-23 @ 09:30  Cholesterol, Serum: 201  LDL Cholesterol Calculated: 121  HDL Cholesterol, Serum: 47  Total Cholesterol/HDL Ration Measurement: --  Triglycerides, Serum: 165

## 2023-11-29 NOTE — BH INPATIENT PSYCHIATRY PROGRESS NOTE - NSBHFUPINTERVALHXFT_PSY_A_CORE
Pt assessed for depression s/p SA via OD on wellbutrin. Chart reviewed and case discussed with treatment team. No interval events reported overnight. Pt seen with medical student and Dr. Dino bryan who also provided Singaporean translation for the pt. Pt calm and cooperative on approach, pt reports feeling slightly less depressed, continued generalized body pain, "My legs feel heavy." PT met with pt yesterday and pt states the exercises for her neck have helped. Denies active SIIP and agrees to come to staff immediately with safety concerns. Denies HIIP, A/VH, or paranoia.  Pt agrees to c/w further increase in cymbalta to 90 mg PO QD to help with both pain and depression.

## 2023-11-29 NOTE — PHYSICAL THERAPY INITIAL EVALUATION ADULT - PERTINENT HX OF CURRENT PROBLEM, REHAB EVAL
Patient is a 34 y/o  woman, she is  with a 4y/o, currently domiciled with her , unemployed and supported by her family, patient is Swedish speaker; that was admitted initially to the medical floor after she tried to commit suicide by OD on Wellbutrin. Patient has been medically clear and later on transfer to the unit 2W. Patient referred to Physical therapy secondary to chronic pain

## 2023-11-30 PROCEDURE — 99232 SBSQ HOSP IP/OBS MODERATE 35: CPT

## 2023-11-30 RX ADMIN — Medication 175 MICROGRAM(S): at 06:27

## 2023-11-30 RX ADMIN — Medication 5 MILLIGRAM(S): at 08:33

## 2023-11-30 RX ADMIN — LIDOCAINE 1 APPLICATION(S): 4 CREAM TOPICAL at 08:34

## 2023-11-30 RX ADMIN — MOMETASONE FUROATE 2 PUFF(S): 220 INHALANT RESPIRATORY (INHALATION) at 08:33

## 2023-11-30 RX ADMIN — PANTOPRAZOLE SODIUM 40 MILLIGRAM(S): 20 TABLET, DELAYED RELEASE ORAL at 08:32

## 2023-11-30 RX ADMIN — Medication 25 MILLIGRAM(S): at 20:41

## 2023-11-30 RX ADMIN — Medication 3 MILLIGRAM(S): at 20:41

## 2023-11-30 RX ADMIN — MOMETASONE FUROATE 2 PUFF(S): 220 INHALANT RESPIRATORY (INHALATION) at 20:44

## 2023-11-30 RX ADMIN — NALTREXONE HYDROCHLORIDE 50 MILLIGRAM(S): 50 TABLET, FILM COATED ORAL at 08:33

## 2023-11-30 RX ADMIN — Medication 1 MILLIGRAM(S): at 08:32

## 2023-11-30 RX ADMIN — DULOXETINE HYDROCHLORIDE 90 MILLIGRAM(S): 30 CAPSULE, DELAYED RELEASE ORAL at 08:33

## 2023-11-30 RX ADMIN — Medication 50 MILLIGRAM(S): at 20:42

## 2023-11-30 RX ADMIN — LIDOCAINE 1 APPLICATION(S): 4 CREAM TOPICAL at 20:45

## 2023-11-30 RX ADMIN — SERTRALINE 100 MILLIGRAM(S): 25 TABLET, FILM COATED ORAL at 08:32

## 2023-11-30 RX ADMIN — Medication 5 MILLIGRAM(S): at 20:42

## 2023-11-30 NOTE — BH SAFETY PLAN - LOCAL URGENT CARE ADDRESS
[address] [place] [address], [place], [address] [address] [place] [address], [place], [address] [address] [place] [address], [place], [address]

## 2023-11-30 NOTE — BH SAFETY PLAN - SUICIDE AND CRISIS [facility], CALL [phone]
Suicide and Crisis [facility], call 010 Suicide and Crisis [facility], call 413 Suicide and Crisis [facility], call 833

## 2023-11-30 NOTE — BH SAFETY PLAN - LOCAL URGENT CARE NAME
See Transitions of Care Document [facility]

## 2023-11-30 NOTE — BH INPATIENT PSYCHIATRY PROGRESS NOTE - NSBHASSESSSUMMFT_PSY_ALL_CORE
Patient is a 32 y/o  woman, she is  with a 4y/o, currently domiciled with her , unemployed and supported by her family, patient is Malay speaker; that was admitted initially to the medical floor after she tried to commit suicide by OD on Wellbutrin. Patient has been medically clear and later on transfer to the unit 2W.    Today, pt reports slight improvement in depression today, c/o chronic pain, PT consult appreciated - pt states exercises for neck pain and leg pain are helpful. Will c/w increase Cymbalta to 90 mg PO QD.     Plan:  1. Admit to the unit 2W on a voluntary status   2. Q 15 min checks   3. Medications      increase Zoloft to 100mg daily      d/c Gabapentin 100mg TID     d/c Prazosin 1 mg at bedtime      increase topamax to 50 mg PO QHS   4. Medical team to evaluate the patient as needed.  5. SW to coordinate a safe discharge plan.

## 2023-11-30 NOTE — BH INPATIENT PSYCHIATRY PROGRESS NOTE - NSBHFUPINTERVALHXFT_PSY_A_CORE
Pt assessed for depression s/p SA via OD on wellbutrin. Chart reviewed and case discussed with treatment team. No interval events reported overnight. Pt seen with  Dr. Dino bryan who also provided Scottish translation for the pt. Pt calm and cooperative on approach, pt reports feeling slightly less depressed, continued generalized body pain, but with some improvements today. PT consult appreciated - pt states PT came yesterday and taught her exercises for leg pain. Denies active SIIP and agrees to come to staff immediately with safety concerns. However, pt is anxious about if she will harm herself when discharged home. Pt reports that her mother has agreed to hold pt's medications for her. Coping mechanisms discussed and reinforced. Pt attending groups (therapists have been printing worksheets in Scottish for the pt). Denies HIIP, A/VH, or paranoia.  Pt agrees to c/w further increase in cymbalta to 90 mg PO QD to help with both pain and depression.

## 2023-11-30 NOTE — BH INPATIENT PSYCHIATRY PROGRESS NOTE - NSBHCHARTREVIEWVS_PSY_A_CORE FT
Vital Signs Last 24 Hrs  T(C): 36.4 (11-30-23 @ 07:32), Max: 36.4 (11-30-23 @ 07:32)  T(F): 97.6 (11-30-23 @ 07:32), Max: 97.6 (11-30-23 @ 07:32)  HR: --  BP: --  BP(mean): --  RR: 19 (11-30-23 @ 07:32) (19 - 19)  SpO2: 99% (11-30-23 @ 07:32) (99% - 99%)    Orthostatic VS  11-30-23 @ 07:32  Lying BP: --/-- HR: --  Sitting BP: 110/62 HR: 78  Standing BP: 100/66 HR: 80  Site: --  Mode: --  Orthostatic VS  11-29-23 @ 07:34  Lying BP: --/-- HR: --  Sitting BP: 113/63 HR: 74  Standing BP: 105/65 HR: 91  Site: --  Mode: --

## 2023-11-30 NOTE — BH INPATIENT PSYCHIATRY PROGRESS NOTE - NSBHMETABOLIC_PSY_ALL_CORE_FT
BMI: BMI (kg/m2): 29.1 (11-14-23 @ 15:15)  HbA1c: A1C with Estimated Average Glucose Result: 5.1 % (11-15-23 @ 09:30)    Glucose:   BP: --Vital Signs Last 24 Hrs  T(C): 36.4 (11-30-23 @ 07:32), Max: 36.4 (11-30-23 @ 07:32)  T(F): 97.6 (11-30-23 @ 07:32), Max: 97.6 (11-30-23 @ 07:32)  HR: --  BP: --  BP(mean): --  RR: 19 (11-30-23 @ 07:32) (19 - 19)  SpO2: 99% (11-30-23 @ 07:32) (99% - 99%)    Orthostatic VS  11-30-23 @ 07:32  Lying BP: --/-- HR: --  Sitting BP: 110/62 HR: 78  Standing BP: 100/66 HR: 80  Site: --  Mode: --  Orthostatic VS  11-29-23 @ 07:34  Lying BP: --/-- HR: --  Sitting BP: 113/63 HR: 74  Standing BP: 105/65 HR: 91  Site: --  Mode: --    Lipid Panel: Date/Time: 11-15-23 @ 09:30  Cholesterol, Serum: 201  LDL Cholesterol Calculated: 121  HDL Cholesterol, Serum: 47  Total Cholesterol/HDL Ration Measurement: --  Triglycerides, Serum: 165

## 2023-11-30 NOTE — BH SAFETY PLAN - SUICIDE PREVENTION LIFELINE PHONES
Suicide Prevention Lifeline Phone: [phone] ([phone] Suicide Prevention Lifeline Phone: [phone] ([phone]) Suicide Prevention [facility] Phone: [phone] [phone] ([phone])

## 2023-12-01 PROBLEM — M79.7 FIBROMYALGIA: Chronic | Status: ACTIVE | Noted: 2023-11-09

## 2023-12-01 PROCEDURE — 99232 SBSQ HOSP IP/OBS MODERATE 35: CPT

## 2023-12-01 RX ADMIN — LIDOCAINE 1 APPLICATION(S): 4 CREAM TOPICAL at 09:11

## 2023-12-01 RX ADMIN — Medication 5 MILLIGRAM(S): at 09:10

## 2023-12-01 RX ADMIN — Medication 3 MILLIGRAM(S): at 20:25

## 2023-12-01 RX ADMIN — NALTREXONE HYDROCHLORIDE 50 MILLIGRAM(S): 50 TABLET, FILM COATED ORAL at 09:11

## 2023-12-01 RX ADMIN — Medication 5 MILLIGRAM(S): at 20:27

## 2023-12-01 RX ADMIN — DULOXETINE HYDROCHLORIDE 90 MILLIGRAM(S): 30 CAPSULE, DELAYED RELEASE ORAL at 09:10

## 2023-12-01 RX ADMIN — Medication 50 MILLIGRAM(S): at 20:27

## 2023-12-01 RX ADMIN — Medication 1 MILLIGRAM(S): at 09:11

## 2023-12-01 RX ADMIN — MOMETASONE FUROATE 2 PUFF(S): 220 INHALANT RESPIRATORY (INHALATION) at 09:20

## 2023-12-01 RX ADMIN — SERTRALINE 100 MILLIGRAM(S): 25 TABLET, FILM COATED ORAL at 09:10

## 2023-12-01 RX ADMIN — PANTOPRAZOLE SODIUM 40 MILLIGRAM(S): 20 TABLET, DELAYED RELEASE ORAL at 09:09

## 2023-12-01 RX ADMIN — LIDOCAINE 1 APPLICATION(S): 4 CREAM TOPICAL at 20:28

## 2023-12-01 RX ADMIN — MOMETASONE FUROATE 2 PUFF(S): 220 INHALANT RESPIRATORY (INHALATION) at 20:24

## 2023-12-01 RX ADMIN — Medication 175 MICROGRAM(S): at 07:21

## 2023-12-01 NOTE — BH INPATIENT PSYCHIATRY PROGRESS NOTE - NSBHMETABOLIC_PSY_ALL_CORE_FT
BMI: BMI (kg/m2): 29.1 (11-14-23 @ 15:15)  HbA1c: A1C with Estimated Average Glucose Result: 5.1 % (11-15-23 @ 09:30)    Glucose:   BP: --Vital Signs Last 24 Hrs  T(C): 36.4 (12-01-23 @ 07:22), Max: 36.4 (12-01-23 @ 07:22)  T(F): 97.6 (12-01-23 @ 07:22), Max: 97.6 (12-01-23 @ 07:22)  HR: --  BP: --  BP(mean): --  RR: 17 (12-01-23 @ 07:22) (17 - 17)  SpO2: 100% (12-01-23 @ 07:22) (100% - 100%)    Orthostatic VS  12-01-23 @ 07:22  Lying BP: --/-- HR: --  Sitting BP: 100/61 HR: 87  Standing BP: 100/61 HR: 88  Site: --  Mode: --  Orthostatic VS  11-30-23 @ 07:32  Lying BP: --/-- HR: --  Sitting BP: 110/62 HR: 78  Standing BP: 100/66 HR: 80  Site: --  Mode: --    Lipid Panel: Date/Time: 11-15-23 @ 09:30  Cholesterol, Serum: 201  LDL Cholesterol Calculated: 121  HDL Cholesterol, Serum: 47  Total Cholesterol/HDL Ration Measurement: --  Triglycerides, Serum: 165

## 2023-12-01 NOTE — BH INPATIENT PSYCHIATRY PROGRESS NOTE - NSBHASSESSSUMMFT_PSY_ALL_CORE
Patient is a 34 y/o  woman, she is  with a 2y/o, currently domiciled with her , unemployed and supported by her family, patient is Swedish speaker; that was admitted initially to the medical floor after she tried to commit suicide by OD on Wellbutrin. Patient has been medically clear and later on transfer to the unit 2W.    Today, pt reports slight improvement in depression today, c/o chronic pain, PT consult appreciated - pt states exercises for neck pain and leg pain are helpful. Will c/w increase Cymbalta to 90 mg PO QD.     Plan:  1. Admit to the unit 2W on a voluntary status   2. Q 15 min checks   3. Medications   DULoxetine 90 milliGRAM(s) Oral daily  folic acid 1 milliGRAM(s) Oral daily  levothyroxine 175 MICROGram(s) Oral daily  lidocaine 5% Ointment 1 Application(s) Topical two times a day  melatonin. 3 milliGRAM(s) Oral at bedtime  mometasone 220 MICROgram(s) Inhaler 2 Puff(s) Inhalation two times a day  naltrexone 50 milliGRAM(s) Oral daily  oxybutynin 5 milliGRAM(s) Oral two times a day  pantoprazole    Tablet 40 milliGRAM(s) Oral before breakfast  sertraline 100 milliGRAM(s) Oral daily  topiramate 50 milliGRAM(s) Oral at bedtime    4. Medical team to evaluate the patient as needed.  5. SW to coordinate a safe discharge plan.
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
## 2023-12-01 NOTE — BH INPATIENT PSYCHIATRY PROGRESS NOTE - NSBHCHARTREVIEWVS_PSY_A_CORE FT
Vital Signs Last 24 Hrs  T(C): 36.4 (12-01-23 @ 07:22), Max: 36.4 (12-01-23 @ 07:22)  T(F): 97.6 (12-01-23 @ 07:22), Max: 97.6 (12-01-23 @ 07:22)  HR: --  BP: --  BP(mean): --  RR: 17 (12-01-23 @ 07:22) (17 - 17)  SpO2: 100% (12-01-23 @ 07:22) (100% - 100%)    Orthostatic VS  12-01-23 @ 07:22  Lying BP: --/-- HR: --  Sitting BP: 100/61 HR: 87  Standing BP: 100/61 HR: 88  Site: --  Mode: --  Orthostatic VS  11-30-23 @ 07:32  Lying BP: --/-- HR: --  Sitting BP: 110/62 HR: 78  Standing BP: 100/66 HR: 80  Site: --  Mode: --

## 2023-12-02 RX ADMIN — Medication 5 MILLIGRAM(S): at 08:25

## 2023-12-02 RX ADMIN — Medication 5 MILLIGRAM(S): at 20:59

## 2023-12-02 RX ADMIN — Medication 175 MICROGRAM(S): at 06:52

## 2023-12-02 RX ADMIN — DULOXETINE HYDROCHLORIDE 90 MILLIGRAM(S): 30 CAPSULE, DELAYED RELEASE ORAL at 08:25

## 2023-12-02 RX ADMIN — NALTREXONE HYDROCHLORIDE 50 MILLIGRAM(S): 50 TABLET, FILM COATED ORAL at 08:25

## 2023-12-02 RX ADMIN — Medication 50 MILLIGRAM(S): at 20:58

## 2023-12-02 RX ADMIN — MOMETASONE FUROATE 2 PUFF(S): 220 INHALANT RESPIRATORY (INHALATION) at 08:30

## 2023-12-02 RX ADMIN — LIDOCAINE 1 APPLICATION(S): 4 CREAM TOPICAL at 08:26

## 2023-12-02 RX ADMIN — Medication 50 MILLIGRAM(S): at 20:59

## 2023-12-02 RX ADMIN — PANTOPRAZOLE SODIUM 40 MILLIGRAM(S): 20 TABLET, DELAYED RELEASE ORAL at 08:07

## 2023-12-02 RX ADMIN — Medication 3 MILLIGRAM(S): at 20:59

## 2023-12-02 RX ADMIN — LIDOCAINE 1 APPLICATION(S): 4 CREAM TOPICAL at 20:59

## 2023-12-02 RX ADMIN — Medication 1 MILLIGRAM(S): at 08:25

## 2023-12-02 RX ADMIN — SERTRALINE 100 MILLIGRAM(S): 25 TABLET, FILM COATED ORAL at 08:26

## 2023-12-03 RX ADMIN — NALTREXONE HYDROCHLORIDE 50 MILLIGRAM(S): 50 TABLET, FILM COATED ORAL at 09:58

## 2023-12-03 RX ADMIN — LIDOCAINE 1 APPLICATION(S): 4 CREAM TOPICAL at 10:00

## 2023-12-03 RX ADMIN — PANTOPRAZOLE SODIUM 40 MILLIGRAM(S): 20 TABLET, DELAYED RELEASE ORAL at 09:59

## 2023-12-03 RX ADMIN — SERTRALINE 100 MILLIGRAM(S): 25 TABLET, FILM COATED ORAL at 09:59

## 2023-12-03 RX ADMIN — Medication 175 MICROGRAM(S): at 06:15

## 2023-12-03 RX ADMIN — Medication 1 MILLIGRAM(S): at 09:59

## 2023-12-03 RX ADMIN — MOMETASONE FUROATE 2 PUFF(S): 220 INHALANT RESPIRATORY (INHALATION) at 09:57

## 2023-12-03 RX ADMIN — Medication 50 MILLIGRAM(S): at 20:35

## 2023-12-03 RX ADMIN — Medication 3 MILLIGRAM(S): at 20:34

## 2023-12-03 RX ADMIN — MOMETASONE FUROATE 2 PUFF(S): 220 INHALANT RESPIRATORY (INHALATION) at 20:34

## 2023-12-03 RX ADMIN — Medication 600 MILLIGRAM(S): at 09:59

## 2023-12-03 RX ADMIN — LIDOCAINE 1 APPLICATION(S): 4 CREAM TOPICAL at 21:11

## 2023-12-03 RX ADMIN — Medication 5 MILLIGRAM(S): at 09:58

## 2023-12-03 RX ADMIN — DULOXETINE HYDROCHLORIDE 90 MILLIGRAM(S): 30 CAPSULE, DELAYED RELEASE ORAL at 09:58

## 2023-12-03 RX ADMIN — Medication 600 MILLIGRAM(S): at 11:02

## 2023-12-03 RX ADMIN — Medication 5 MILLIGRAM(S): at 20:35

## 2023-12-04 PROCEDURE — 99232 SBSQ HOSP IP/OBS MODERATE 35: CPT

## 2023-12-04 RX ORDER — DULOXETINE HYDROCHLORIDE 30 MG/1
120 CAPSULE, DELAYED RELEASE ORAL DAILY
Refills: 0 | Status: DISCONTINUED | OUTPATIENT
Start: 2023-12-05 | End: 2023-12-13

## 2023-12-04 RX ADMIN — Medication 5 MILLIGRAM(S): at 20:57

## 2023-12-04 RX ADMIN — PANTOPRAZOLE SODIUM 40 MILLIGRAM(S): 20 TABLET, DELAYED RELEASE ORAL at 09:05

## 2023-12-04 RX ADMIN — MOMETASONE FUROATE 2 PUFF(S): 220 INHALANT RESPIRATORY (INHALATION) at 20:58

## 2023-12-04 RX ADMIN — LIDOCAINE 1 APPLICATION(S): 4 CREAM TOPICAL at 09:50

## 2023-12-04 RX ADMIN — Medication 5 MILLIGRAM(S): at 09:05

## 2023-12-04 RX ADMIN — SERTRALINE 100 MILLIGRAM(S): 25 TABLET, FILM COATED ORAL at 09:05

## 2023-12-04 RX ADMIN — Medication 650 MILLIGRAM(S): at 20:35

## 2023-12-04 RX ADMIN — DULOXETINE HYDROCHLORIDE 90 MILLIGRAM(S): 30 CAPSULE, DELAYED RELEASE ORAL at 09:05

## 2023-12-04 RX ADMIN — ALBUTEROL 2 PUFF(S): 90 AEROSOL, METERED ORAL at 09:06

## 2023-12-04 RX ADMIN — MOMETASONE FUROATE 2 PUFF(S): 220 INHALANT RESPIRATORY (INHALATION) at 12:08

## 2023-12-04 RX ADMIN — Medication 175 MICROGRAM(S): at 06:16

## 2023-12-04 RX ADMIN — Medication 1 MILLIGRAM(S): at 09:05

## 2023-12-04 RX ADMIN — NALTREXONE HYDROCHLORIDE 50 MILLIGRAM(S): 50 TABLET, FILM COATED ORAL at 09:05

## 2023-12-04 RX ADMIN — Medication 50 MILLIGRAM(S): at 20:57

## 2023-12-04 RX ADMIN — Medication 3 MILLIGRAM(S): at 20:57

## 2023-12-04 RX ADMIN — Medication 600 MILLIGRAM(S): at 09:05
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
## 2023-12-04 NOTE — BH PSYCHOLOGY - GROUP THERAPY NOTE - NSPSYCHOLGRPDBTGOAL_PSY_A_CORE
reduce mood and affective lability/reduce impulsive self-defeating behavior/improve ability to indentify feelings/reduce vulnerability to emotional dysregualation/other...
reduce mood and affective lability/reduce impulsive self-defeating behavior/improve ability to indentify feelings/improve ability to communicate feelings/reduce vulnerability to emotional dysregualation/other...
reduce mood and affective lability/reduce impulsive self-defeating behavior/reduce self-injurious behavior/improve ability to indentify feelings/improve ability to communicate feelings/reduce vulnerability to emotional dysregualation/other...

## 2023-12-04 NOTE — BH INPATIENT PSYCHIATRY PROGRESS NOTE - CURRENT MEDICATION
MEDICATIONS  (STANDING):  folic acid 1 milliGRAM(s) Oral daily  levothyroxine 175 MICROGram(s) Oral daily  lidocaine 5% Ointment 1 Application(s) Topical two times a day  melatonin. 3 milliGRAM(s) Oral at bedtime  mometasone 220 MICROgram(s) Inhaler 2 Puff(s) Inhalation two times a day  naltrexone 50 milliGRAM(s) Oral daily  oxybutynin 5 milliGRAM(s) Oral two times a day  pantoprazole    Tablet 40 milliGRAM(s) Oral before breakfast  sertraline 100 milliGRAM(s) Oral daily  topiramate 50 milliGRAM(s) Oral at bedtime    MEDICATIONS  (PRN):  acetaminophen     Tablet .. 650 milliGRAM(s) Oral every 6 hours PRN Mild Pain (1 - 3)  albuterol    90 MICROgram(s) HFA Inhaler 2 Puff(s) Inhalation every 6 hours PRN asthma  aluminum hydroxide/magnesium hydroxide/simethicone Suspension 30 milliLiter(s) Oral every 6 hours PRN Dyspepsia  hydrOXYzine hydrochloride 25 milliGRAM(s) Oral every 6 hours PRN Anxiety  ibuprofen  Tablet. 600 milliGRAM(s) Oral every 8 hours PRN Moderate Pain (4 - 6)  OLANZapine 5 milliGRAM(s) Oral every 6 hours PRN Severe agitation or psychosis  ondansetron    Tablet 4 milliGRAM(s) Oral every 8 hours PRN nausea  traZODone 50 milliGRAM(s) Oral at bedtime PRN Insomnia

## 2023-12-04 NOTE — BH INPATIENT PSYCHIATRY PROGRESS NOTE - NSBHCHARTREVIEWVS_PSY_A_CORE FT
Vital Signs Last 24 Hrs  T(C): 36.8 (12-04-23 @ 07:56), Max: 36.8 (12-04-23 @ 07:56)  T(F): 98.3 (12-04-23 @ 07:56), Max: 98.3 (12-04-23 @ 07:56)  HR: --  BP: --  BP(mean): --  RR: 17 (12-04-23 @ 07:56) (17 - 17)  SpO2: 99% (12-04-23 @ 07:56) (99% - 99%)    Orthostatic VS  12-04-23 @ 07:56  Lying BP: --/-- HR: --  Sitting BP: 107/58 HR: 76  Standing BP: 104/58 HR: 76  Site: --  Mode: --  Orthostatic VS  12-03-23 @ 08:51  Lying BP: --/-- HR: --  Sitting BP: 107/71 HR: 81  Standing BP: 110/72 HR: 92  Site: --  Mode: --

## 2023-12-04 NOTE — BH PSYCHOLOGY - GROUP THERAPY NOTE - NSPSYCHOLGRPDBTPROB_PSY_A_CORE
anxiety/depressed mood/dissociation/emotional dysregulation/other...
anxiety/depressed mood/emotional dysregulation/other...
anxiety/depressed mood/emotional dysregulation/other...

## 2023-12-04 NOTE — CHART NOTE - NSCHARTNOTEFT_GEN_A_CORE
Called by RN to evaluate patient w/ PMHx chronic pain/fibromyalgia c/o pain, numbness, and tingling in her hands and feet. Patient states that she does not believe this symptoms are related to her fibromyalgia. Patient states the same thing happened several years ago and she was found to be hypocalcemic. Patient denies any other symptoms including muscle cramps/spasms/weakness. Physical exam unremarkable, VSS. Patient requesting tylenol at this time. Will order serum Ca to r/o hypocalcemia. Primary team to f/u results.

## 2023-12-04 NOTE — BH PSYCHOLOGY - GROUP THERAPY NOTE - NSPSYCHOLGRPDBTPT_PSY_A_CORE FT
Patient attended a Dialectal Behavior Therapy Group (DBT) group focused on the IMPROVE skill. Group began with a brief check in asking pts to share review of skills and/or present emotions. Pt participated in a mindfulness exercise and were encouraged to share thought/reactions. Participants then learned the IMPROVE skill; Imagery, Meaning, Prayer, Relaxation, One thing in the moment, Vacation and Encouragement. The group explored practical applications of these concepts to emphasize distress tolerance and emotional regulation during their inpatient stay, prompting a collaborative a supportive environment for skills acquisition and personal growth. Group facilitator explained concepts, reinforced participation, and engaged patients in discussion. 
Patient attended a Dialectal Behavior Therapy Group (DBT) group focused on the TIPP skill. Group began with a brief check in asking pts to share review of skills and/or present emotions. Pt participated in a mindfulness exercise and were encouraged to share thought/reactions. Participants then learned crisis survival techniques to regulate overwhelming emotions. TIPP stands for Temperature, Intense Exercise, Paced Breathing and Paired muscle Relaxation. These skills provide individual with quick effective methods to manage distressing emotions during times of crisis, prompting emotional regulation and stability. Group facilitator explained concepts, reinforced participation, and engaged patients in discussion.   
Patient attended a Trauma Symptom Management group focused on the way to manage intrusive symptoms with a Containment skill. Group began with a brief check in asking pts to share review of skills and/or present emotions. Pt participated in a mindfulness exercise and were encouraged to share thought/reactions. Participants then learned the Containment skill which involved physical containment and using imagery to respond to intrusive symptoms. Intrusive symptoms were discussed, such as nightmares, flashbacks, ruminating thoughts, overwhelming emotions, or repeating memories. Facilitator discussed the difference of containment vs “stuffing.” Group facilitator explained concepts, reinforced participation, and engaged patients in discussion.

## 2023-12-04 NOTE — BH PSYCHOLOGY - GROUP THERAPY NOTE - NSPSYCHOLGRPDBTINT_PSY_A_CORE
reveiwed mindfullness homework/reviewed distress tolerance, skills homework
reveiwed mindfullness homework/review emotion regulation homework/reviewed distress tolerance, skills homework
reviewed distress tolerance, skills homework/other...

## 2023-12-04 NOTE — BH PSYCHOLOGY - GROUP THERAPY NOTE - NSPSYCHOLGRPDBTPT_PSY_A_CORE
stable mood and affect in group/patient showing good behavior control/other...
patient showing good behavior control/Patient able to identify mood states/other...
Patient able to identify mood states/other...

## 2023-12-04 NOTE — BH INPATIENT PSYCHIATRY PROGRESS NOTE - NSBHMETABOLIC_PSY_ALL_CORE_FT
BMI: BMI (kg/m2): 29.1 (11-14-23 @ 15:15)  HbA1c: A1C with Estimated Average Glucose Result: 5.1 % (11-15-23 @ 09:30)    Glucose:   BP: --Vital Signs Last 24 Hrs  T(C): 36.8 (12-04-23 @ 07:56), Max: 36.8 (12-04-23 @ 07:56)  T(F): 98.3 (12-04-23 @ 07:56), Max: 98.3 (12-04-23 @ 07:56)  HR: --  BP: --  BP(mean): --  RR: 17 (12-04-23 @ 07:56) (17 - 17)  SpO2: 99% (12-04-23 @ 07:56) (99% - 99%)    Orthostatic VS  12-04-23 @ 07:56  Lying BP: --/-- HR: --  Sitting BP: 107/58 HR: 76  Standing BP: 104/58 HR: 76  Site: --  Mode: --  Orthostatic VS  12-03-23 @ 08:51  Lying BP: --/-- HR: --  Sitting BP: 107/71 HR: 81  Standing BP: 110/72 HR: 92  Site: --  Mode: --    Lipid Panel: Date/Time: 11-15-23 @ 09:30  Cholesterol, Serum: 201  LDL Cholesterol Calculated: 121  HDL Cholesterol, Serum: 47  Total Cholesterol/HDL Ration Measurement: --  Triglycerides, Serum: 165

## 2023-12-04 NOTE — BH INPATIENT PSYCHIATRY PROGRESS NOTE - NSBHATTESTBILLING_PSY_A_CORE
[id number] OBS or IP - moderate complexity OR 35-49 mins [id number] OBS or IP - moderate complexity OR 35-49 mins

## 2023-12-05 LAB
ALBUMIN SERPL ELPH-MCNC: 4.1 G/DL — SIGNIFICANT CHANGE UP (ref 3.3–5)
ALBUMIN SERPL ELPH-MCNC: 4.1 G/DL — SIGNIFICANT CHANGE UP (ref 3.3–5)
ALP SERPL-CCNC: 109 U/L — SIGNIFICANT CHANGE UP (ref 40–120)
ALP SERPL-CCNC: 109 U/L — SIGNIFICANT CHANGE UP (ref 40–120)
ALT FLD-CCNC: 24 U/L — SIGNIFICANT CHANGE UP (ref 4–33)
ALT FLD-CCNC: 24 U/L — SIGNIFICANT CHANGE UP (ref 4–33)
ANION GAP SERPL CALC-SCNC: 13 MMOL/L — SIGNIFICANT CHANGE UP (ref 7–14)
ANION GAP SERPL CALC-SCNC: 13 MMOL/L — SIGNIFICANT CHANGE UP (ref 7–14)
AST SERPL-CCNC: 16 U/L — SIGNIFICANT CHANGE UP (ref 4–32)
AST SERPL-CCNC: 16 U/L — SIGNIFICANT CHANGE UP (ref 4–32)
BILIRUB SERPL-MCNC: 0.2 MG/DL — SIGNIFICANT CHANGE UP (ref 0.2–1.2)
BILIRUB SERPL-MCNC: 0.2 MG/DL — SIGNIFICANT CHANGE UP (ref 0.2–1.2)
BUN SERPL-MCNC: 22 MG/DL — SIGNIFICANT CHANGE UP (ref 7–23)
BUN SERPL-MCNC: 22 MG/DL — SIGNIFICANT CHANGE UP (ref 7–23)
CALCIUM SERPL-MCNC: 8.5 MG/DL — SIGNIFICANT CHANGE UP (ref 8.4–10.5)
CALCIUM SERPL-MCNC: 8.5 MG/DL — SIGNIFICANT CHANGE UP (ref 8.4–10.5)
CHLORIDE SERPL-SCNC: 105 MMOL/L — SIGNIFICANT CHANGE UP (ref 98–107)
CHLORIDE SERPL-SCNC: 105 MMOL/L — SIGNIFICANT CHANGE UP (ref 98–107)
CO2 SERPL-SCNC: 21 MMOL/L — LOW (ref 22–31)
CO2 SERPL-SCNC: 21 MMOL/L — LOW (ref 22–31)
CREAT SERPL-MCNC: 0.57 MG/DL — SIGNIFICANT CHANGE UP (ref 0.5–1.3)
CREAT SERPL-MCNC: 0.57 MG/DL — SIGNIFICANT CHANGE UP (ref 0.5–1.3)
EGFR: 125 ML/MIN/1.73M2 — SIGNIFICANT CHANGE UP
EGFR: 125 ML/MIN/1.73M2 — SIGNIFICANT CHANGE UP
GLUCOSE SERPL-MCNC: 105 MG/DL — HIGH (ref 70–99)
GLUCOSE SERPL-MCNC: 105 MG/DL — HIGH (ref 70–99)
POTASSIUM SERPL-MCNC: 3.8 MMOL/L — SIGNIFICANT CHANGE UP (ref 3.5–5.3)
POTASSIUM SERPL-MCNC: 3.8 MMOL/L — SIGNIFICANT CHANGE UP (ref 3.5–5.3)
POTASSIUM SERPL-SCNC: 3.8 MMOL/L — SIGNIFICANT CHANGE UP (ref 3.5–5.3)
POTASSIUM SERPL-SCNC: 3.8 MMOL/L — SIGNIFICANT CHANGE UP (ref 3.5–5.3)
PROT SERPL-MCNC: 7.3 G/DL — SIGNIFICANT CHANGE UP (ref 6–8.3)
PROT SERPL-MCNC: 7.3 G/DL — SIGNIFICANT CHANGE UP (ref 6–8.3)
SODIUM SERPL-SCNC: 139 MMOL/L — SIGNIFICANT CHANGE UP (ref 135–145)
SODIUM SERPL-SCNC: 139 MMOL/L — SIGNIFICANT CHANGE UP (ref 135–145)

## 2023-12-05 PROCEDURE — 99233 SBSQ HOSP IP/OBS HIGH 50: CPT

## 2023-12-05 PROCEDURE — 99223 1ST HOSP IP/OBS HIGH 75: CPT

## 2023-12-05 RX ADMIN — Medication 175 MICROGRAM(S): at 06:26

## 2023-12-05 RX ADMIN — Medication 1 MILLIGRAM(S): at 08:40

## 2023-12-05 RX ADMIN — LIDOCAINE 1 APPLICATION(S): 4 CREAM TOPICAL at 21:18

## 2023-12-05 RX ADMIN — Medication 50 MILLIGRAM(S): at 21:17

## 2023-12-05 RX ADMIN — Medication 30 MILLILITER(S): at 09:55

## 2023-12-05 RX ADMIN — Medication 5 MILLIGRAM(S): at 21:17

## 2023-12-05 RX ADMIN — DULOXETINE HYDROCHLORIDE 120 MILLIGRAM(S): 30 CAPSULE, DELAYED RELEASE ORAL at 08:39

## 2023-12-05 RX ADMIN — Medication 5 MILLIGRAM(S): at 08:40

## 2023-12-05 RX ADMIN — SERTRALINE 100 MILLIGRAM(S): 25 TABLET, FILM COATED ORAL at 08:39

## 2023-12-05 RX ADMIN — PANTOPRAZOLE SODIUM 40 MILLIGRAM(S): 20 TABLET, DELAYED RELEASE ORAL at 08:40

## 2023-12-05 RX ADMIN — Medication 3 MILLIGRAM(S): at 21:17

## 2023-12-05 RX ADMIN — MOMETASONE FUROATE 2 PUFF(S): 220 INHALANT RESPIRATORY (INHALATION) at 21:18

## 2023-12-05 RX ADMIN — NALTREXONE HYDROCHLORIDE 50 MILLIGRAM(S): 50 TABLET, FILM COATED ORAL at 08:40

## 2023-12-05 NOTE — BH INPATIENT PSYCHIATRY PROGRESS NOTE - NSBHATTESTBILLING_PSY_A_CORE
Self [id number] OBS or IP - high complexity OR 50-79 mins [id number]-Owgpgnknas OBS or IP - high complexity OR 50-79 mins

## 2023-12-05 NOTE — CONSULT NOTE ADULT - SUBJECTIVE AND OBJECTIVE BOX
HPI:     PAST MEDICAL & SURGICAL HISTORY:  Asthma      Fibromyalgia      Thyroid cancer      Anxiety and depression      H/O:           Review of Systems:   CONSTITUTIONAL: No fever, weight loss, or fatigue  EYES: No eye pain, visual disturbances, or discharge  ENMT:  No difficulty hearing, tinnitus, vertigo; No sinus or throat pain  NECK: No pain or stiffness  RESPIRATORY: No cough, wheezing, chills or hemoptysis; No shortness of breath  CARDIOVASCULAR: No chest pain, palpitations, dizziness, or leg swelling  GASTROINTESTINAL: No abdominal or epigastric pain. No nausea, vomiting, or hematemesis; No diarrhea or constipation. No melena or hematochezia.  GENITOURINARY: No dysuria, frequency, hematuria, or incontinence  NEUROLOGICAL: No headaches, memory loss, loss of strength, numbness, or tremors  SKIN: No itching, burning, rashes, or lesions   LYMPH NODES: No enlarged glands  ENDOCRINE: No heat or cold intolerance; No hair loss  MUSCULOSKELETAL: No joint pain or swelling; No muscle, back, or extremity pain  HEME/LYMPH: No easy bruising, or bleeding gums  ALLERY AND IMMUNOLOGIC: No hives or eczema    Allergies    penicillin (Short breath; Urticaria)    Intolerances        Social History:     FAMILY HISTORY:  No pertinent family history in first degree relatives        MEDICATIONS  (STANDING):  DULoxetine 120 milliGRAM(s) Oral daily  folic acid 1 milliGRAM(s) Oral daily  levothyroxine 175 MICROGram(s) Oral daily  lidocaine 5% Ointment 1 Application(s) Topical two times a day  melatonin. 3 milliGRAM(s) Oral at bedtime  mometasone 220 MICROgram(s) Inhaler 2 Puff(s) Inhalation two times a day  naltrexone 50 milliGRAM(s) Oral daily  oxybutynin 5 milliGRAM(s) Oral two times a day  pantoprazole    Tablet 40 milliGRAM(s) Oral before breakfast  sertraline 100 milliGRAM(s) Oral daily  topiramate 50 milliGRAM(s) Oral at bedtime    MEDICATIONS  (PRN):  acetaminophen     Tablet .. 650 milliGRAM(s) Oral every 6 hours PRN Mild Pain (1 - 3)  albuterol    90 MICROgram(s) HFA Inhaler 2 Puff(s) Inhalation every 6 hours PRN asthma  aluminum hydroxide/magnesium hydroxide/simethicone Suspension 30 milliLiter(s) Oral every 6 hours PRN Dyspepsia  hydrOXYzine hydrochloride 25 milliGRAM(s) Oral every 6 hours PRN Anxiety  ibuprofen  Tablet. 600 milliGRAM(s) Oral every 8 hours PRN Moderate Pain (4 - 6)  OLANZapine 5 milliGRAM(s) Oral every 6 hours PRN Severe agitation or psychosis  ondansetron    Tablet 4 milliGRAM(s) Oral every 8 hours PRN nausea  traZODone 50 milliGRAM(s) Oral at bedtime PRN Insomnia      Vital Signs Last 24 Hrs  T(C): 36.8 (05 Dec 2023 07:37), Max: 36.8 (05 Dec 2023 07:37)  T(F): 98.3 (05 Dec 2023 07:37), Max: 98.3 (05 Dec 2023 07:37)  HR: --  BP: --  BP(mean): --  RR: 18 (05 Dec 2023 07:37) (18 - 18)  SpO2: 99% (05 Dec 2023 07:37) (99% - 99%)      CAPILLARY BLOOD GLUCOSE            PHYSICAL EXAM:  GENERAL: NAD  HEAD:  Atraumatic, Normocephalic  EYES: EOMI, conjunctiva and sclera clear  NECK: Supple, No JVD  CHEST/LUNG: Clear to auscultation bilaterally; No wheeze  HEART: Regular rate and rhythm; No murmurs, rubs, or gallops  ABDOMEN: Soft, Nontender, Nondistended; Bowel sounds present  EXTREMITIES:  2+ Peripheral Pulses, No clubbing, cyanosis, or edema  NEUROLOGY: non-focal  SKIN: No rashes or lesions    LABS:        139  |  105  |  22  ----------------------------<  105<H>  3.8   |  21<L>  |  0.57    Ca    8.5      05 Dec 2023 10:30    TPro  7.3  /  Alb  4.1  /  TBili  0.2  /  DBili  x   /  AST  16  /  ALT  24  /  AlkPhos  109  12          Urinalysis Basic - ( 05 Dec 2023 10:30 )    Color: x / Appearance: x / SG: x / pH: x  Gluc: 105 mg/dL / Ketone: x  / Bili: x / Urobili: x   Blood: x / Protein: x / Nitrite: x   Leuk Esterase: x / RBC: x / WBC x   Sq Epi: x / Non Sq Epi: x / Bacteria: x        EKG(personally reviewed):    RADIOLOGY & ADDITIONAL TESTS:    Imaging Personally Reviewed:    Consultant(s) Notes Reviewed:      Care Discussed with Consultants/Other Providers:   HPI: Pt is 33F admitted to [facility] 23.  Asked to evaluate hang and feet tingling sensations.  Pt has history of fibromyalgia and thyroid cancer.  She reports that symptoms are not similar to fibromyalgia symptoms but when she had hypocalcemia once she had somewhat similar symptoms. When examined pt had no symptoms, says the come and go for the past day.  Oen tow looked bluish wheil she had symptoms.    PAST MEDICAL & SURGICAL HISTORY:  Asthma      Fibromyalgia      Thyroid cancer      Anxiety and depression      H/O:           Review of Systems:   CONSTITUTIONAL: No fever, weight loss, or fatigue  EYES: No eye pain, visual disturbances, or discharge  ENMT:  No difficulty hearing, tinnitus, vertigo; No sinus or throat pain  NECK: No pain or stiffness  RESPIRATORY: No cough, wheezing, chills or hemoptysis; No shortness of breath  CARDIOVASCULAR: No chest pain, palpitations, dizziness, or leg swelling  GASTROINTESTINAL: No abdominal or epigastric pain. No nausea, vomiting, or hematemesis; No diarrhea or constipation. No melena or hematochezia.  GENITOURINARY: No dysuria, frequency, hematuria, or incontinence  NEUROLOGICAL: see HPI  SKIN: No itching, burning, rashes, or lesions   LYMPH NODES: No enlarged glands  ENDOCRINE: No heat or cold intolerance; No hair loss  MUSCULOSKELETAL: No joint pain or swelling; No muscle, back, or extremity pain  HEME/LYMPH: No easy bruising, or bleeding gums  ALLERY AND IMMUNOLOGIC: No hives or eczema    Allergies    penicillin (Short breath; Urticaria)    Intolerances        Social History: no etoh tob idu    FAMILY HISTORY:  No pertinent family history in first degree relatives        MEDICATIONS  (STANDING):  DULoxetine 120 milliGRAM(s) Oral daily  folic acid 1 milliGRAM(s) Oral daily  levothyroxine 175 MICROGram(s) Oral daily  lidocaine 5% Ointment 1 Application(s) Topical two times a day  melatonin. 3 milliGRAM(s) Oral at bedtime  mometasone 220 MICROgram(s) Inhaler 2 Puff(s) Inhalation two times a day  naltrexone 50 milliGRAM(s) Oral daily  oxybutynin 5 milliGRAM(s) Oral two times a day  pantoprazole    Tablet 40 milliGRAM(s) Oral before breakfast  sertraline 100 milliGRAM(s) Oral daily  topiramate 50 milliGRAM(s) Oral at bedtime    MEDICATIONS  (PRN):  acetaminophen     Tablet .. 650 milliGRAM(s) Oral every 6 hours PRN Mild Pain (1 - 3)  albuterol    90 MICROgram(s) HFA Inhaler 2 Puff(s) Inhalation every 6 hours PRN asthma  aluminum hydroxide/magnesium hydroxide/simethicone Suspension 30 milliLiter(s) Oral every 6 hours PRN Dyspepsia  hydrOXYzine hydrochloride 25 milliGRAM(s) Oral every 6 hours PRN Anxiety  ibuprofen  Tablet. 600 milliGRAM(s) Oral every 8 hours PRN Moderate Pain (4 - 6)  OLANZapine 5 milliGRAM(s) Oral every 6 hours PRN Severe agitation or psychosis  ondansetron    Tablet 4 milliGRAM(s) Oral every 8 hours PRN nausea  traZODone 50 milliGRAM(s) Oral at bedtime PRN Insomnia      Vital Signs Last 24 Hrs  T(C): 36.8 (05 Dec 2023 07:37), Max: 36.8 (05 Dec 2023 07:37)  T(F): 98.3 (05 Dec 2023 07:37), Max: 98.3 (05 Dec 2023 07:37)  HR: 87  BP: 118/77  BP(mean): --  RR: 18 (05 Dec 2023 07:37) (18 - 18)  SpO2: 99% (05 Dec 2023 07:37) (99% - 99%)      CAPILLARY BLOOD GLUCOSE            PHYSICAL EXAM:  GENERAL: NAD  HEAD:  Atraumatic, Normocephalic  EYES: EOMI, conjunctiva and sclera clear  NECK: Supple, No JVD  CHEST/LUNG: Clear to auscultation bilaterally; No wheeze  HEART: Regular rate and rhythm; No murmurs, rubs, or gallops  ABDOMEN: Soft, Nontender, Nondistended; Bowel sounds present  EXTREMITIES:  2+ Peripheral Pulses, No clubbing, cyanosis, or edema  NEUROLOGY: non-focal  SKIN: No rashes or lesions    LABS:        139  |  105  |  22  ----------------------------<  105<H>  3.8   |  21<L>  |  0.57    Ca    8.5      05 Dec 2023 10:30    TPro  7.3  /  Alb  4.1  /  TBili  0.2  /  DBili  x   /  AST  16  /  ALT  24  /  AlkPhos  109  12-05          Urinalysis Basic - ( 05 Dec 2023 10:30 )    Color: x / Appearance: x / SG: x / pH: x  Gluc: 105 mg/dL / Ketone: x  / Bili: x / Urobili: x   Blood: x / Protein: x / Nitrite: x   Leuk Esterase: x / RBC: x / WBC x   Sq Epi: x / Non Sq Epi: x / Bacteria: x      Care Discussed with Consultants/Other Providers: NP   HPI: Pt is 33F admitted to [facility] 23.  Asked to evaluate hang and feet tingling sensations.  Pt has history of fibromyalgia and thyroid cancer.  She reports that symptoms are not similar to fibromyalgia symptoms but when she had hypocalcemia once she had somewhat similar symptoms. When examined pt had no symptoms, says the come and go for the past day.  Oen tow looked bluish wheil she had symptoms.    PAST MEDICAL & SURGICAL HISTORY:  Asthma      Fibromyalgia      Thyroid cancer      Anxiety and depression      H/O:           Review of Systems:   CONSTITUTIONAL: No fever, weight loss, or fatigue  EYES: No eye pain, visual disturbances, or discharge  ENMT:  No difficulty hearing, tinnitus, vertigo; No sinus or throat pain  NECK: No pain or stiffness  RESPIRATORY: No cough, wheezing, chills or hemoptysis; No shortness of breath  CARDIOVASCULAR: No chest pain, palpitations, dizziness, or leg swelling  GASTROINTESTINAL: No abdominal or epigastric pain. No nausea, vomiting, or hematemesis; No diarrhea or constipation. No melena or hematochezia.  GENITOURINARY: No dysuria, frequency, hematuria, or incontinence  NEUROLOGICAL: see HPI  SKIN: No itching, burning, rashes, or lesions   LYMPH NODES: No enlarged glands  ENDOCRINE: No heat or cold intolerance; No hair loss  MUSCULOSKELETAL: No joint pain or swelling; No muscle, back, or extremity pain  HEME/LYMPH: No easy bruising, or bleeding gums  ALLERY AND IMMUNOLOGIC: No hives or eczema    Allergies    penicillin (Short breath; Urticaria)    Intolerances        Social History: no etoh tob idu    FAMILY HISTORY:  No pertinent family history in first degree relatives        MEDICATIONS  (STANDING):  DULoxetine 120 milliGRAM(s) Oral daily  folic acid 1 milliGRAM(s) Oral daily  levothyroxine 175 MICROGram(s) Oral daily  lidocaine 5% Ointment 1 Application(s) Topical two times a day  melatonin. 3 milliGRAM(s) Oral at bedtime  mometasone 220 MICROgram(s) Inhaler 2 Puff(s) Inhalation two times a day  naltrexone 50 milliGRAM(s) Oral daily  oxybutynin 5 milliGRAM(s) Oral two times a day  pantoprazole    Tablet 40 milliGRAM(s) Oral before breakfast  sertraline 100 milliGRAM(s) Oral daily  topiramate 50 milliGRAM(s) Oral at bedtime    MEDICATIONS  (PRN):  acetaminophen     Tablet .. 650 milliGRAM(s) Oral every 6 hours PRN Mild Pain (1 - 3)  albuterol    90 MICROgram(s) HFA Inhaler 2 Puff(s) Inhalation every 6 hours PRN asthma  aluminum hydroxide/magnesium hydroxide/simethicone Suspension 30 milliLiter(s) Oral every 6 hours PRN Dyspepsia  hydrOXYzine hydrochloride 25 milliGRAM(s) Oral every 6 hours PRN Anxiety  ibuprofen  Tablet. 600 milliGRAM(s) Oral every 8 hours PRN Moderate Pain (4 - 6)  OLANZapine 5 milliGRAM(s) Oral every 6 hours PRN Severe agitation or psychosis  ondansetron    Tablet 4 milliGRAM(s) Oral every 8 hours PRN nausea  traZODone 50 milliGRAM(s) Oral at bedtime PRN Insomnia      Vital Signs Last 24 Hrs  T(C): 36.8 (05 Dec 2023 07:37), Max: 36.8 (05 Dec 2023 07:37)  T(F): 98.3 (05 Dec 2023 07:37), Max: 98.3 (05 Dec 2023 07:37)  HR: 87  BP: 118/77  BP(mean): --  RR: 18 (05 Dec 2023 07:37) (18 - 18)  SpO2: 99% (05 Dec 2023 07:37) (99% - 99%)      CAPILLARY BLOOD GLUCOSE            PHYSICAL EXAM:  GENERAL: NAD  HEAD:  Atraumatic, Normocephalic  EYES: EOMI, conjunctiva and sclera clear  NECK: Supple, No JVD  CHEST/LUNG: Clear to auscultation bilaterally; No wheeze  HEART: Regular rate and rhythm; No murmurs, rubs, or gallops  ABDOMEN: Soft, Nontender, Nondistended; Bowel sounds present  EXTREMITIES:  2+ Peripheral Pulses, No clubbing, cyanosis, or edema  NEUROLOGY: non-focal  SKIN: No rashes or lesions    LABS:        139  |  105  |  22  ----------------------------<  105<H>  3.8   |  21<L>  |  0.57    Ca    8.5      05 Dec 2023 10:30    TPro  7.3  /  Alb  4.1  /  TBili  0.2  /  DBili  x   /  AST  16  /  ALT  24  /  AlkPhos  109  12-05          Urinalysis Basic - ( 05 Dec 2023 10:30 )    Color: x / Appearance: x / SG: x / pH: x  Gluc: 105 mg/dL / Ketone: x  / Bili: x / Urobili: x   Blood: x / Protein: x / Nitrite: x   Leuk Esterase: x / RBC: x / WBC x   Sq Epi: x / Non Sq Epi: x / Bacteria: x      Care Discussed with Consultants/Other Providers: NP

## 2023-12-05 NOTE — BH INPATIENT PSYCHIATRY PROGRESS NOTE - NSBHCHARTREVIEWVS_PSY_A_CORE FT
Vital Signs Last 24 Hrs  T(C): 36.8 (12-05-23 @ 07:37), Max: 36.8 (12-05-23 @ 07:37)  T(F): 98.3 (12-05-23 @ 07:37), Max: 98.3 (12-05-23 @ 07:37)  HR: --  BP: --  BP(mean): --  RR: 18 (12-05-23 @ 07:37) (18 - 18)  SpO2: 99% (12-05-23 @ 07:37) (99% - 99%)    Orthostatic VS  12-05-23 @ 07:37  Lying BP: --/-- HR: --  Sitting BP: 118/77 HR: 87  Standing BP: 110/69 HR: 84  Site: --  Mode: --  Orthostatic VS  12-04-23 @ 07:56  Lying BP: --/-- HR: --  Sitting BP: 107/58 HR: 76  Standing BP: 104/58 HR: 76  Site: --  Mode: --

## 2023-12-05 NOTE — BH INPATIENT PSYCHIATRY PROGRESS NOTE - NSBHFUPINTERVALHXFT_PSY_A_CORE
Pt assessed for depression s/p SA via OD on wellbutrin. Chart reviewed and case discussed with treatment team. Overnight, pt again c/o tingling in hands and was seen by PA, reporting that this happened when her calcium levels were low in the past - calcium level = 8.5 and CMP WNL this AM. Pt reported feelings of nausea and bloating after eating breakfast this morning (pt drank milk, states she does not tolerate milk but has been drinking it during admission without this feeling before). Pt given PRN maalox PRN. In the afternoon, pt declined to eat breakfast and continues to c/o tingling in hands and heels, declines another trial of gabapentin. Medicine consulted and to assess pt. Zofran PO PRN ordered. Pt is somatically preoccupied, sxs possibly anxiety related. Pt agrees to c/w further increase in cymbalta to 120 mg PO QD to help with both pain and depression.

## 2023-12-05 NOTE — BH INPATIENT PSYCHIATRY PROGRESS NOTE - CURRENT MEDICATION
MEDICATIONS  (STANDING):  DULoxetine 120 milliGRAM(s) Oral daily  folic acid 1 milliGRAM(s) Oral daily  levothyroxine 175 MICROGram(s) Oral daily  lidocaine 5% Ointment 1 Application(s) Topical two times a day  melatonin. 3 milliGRAM(s) Oral at bedtime  mometasone 220 MICROgram(s) Inhaler 2 Puff(s) Inhalation two times a day  naltrexone 50 milliGRAM(s) Oral daily  oxybutynin 5 milliGRAM(s) Oral two times a day  pantoprazole    Tablet 40 milliGRAM(s) Oral before breakfast  sertraline 100 milliGRAM(s) Oral daily  topiramate 50 milliGRAM(s) Oral at bedtime    MEDICATIONS  (PRN):  acetaminophen     Tablet .. 650 milliGRAM(s) Oral every 6 hours PRN Mild Pain (1 - 3)  albuterol    90 MICROgram(s) HFA Inhaler 2 Puff(s) Inhalation every 6 hours PRN asthma  aluminum hydroxide/magnesium hydroxide/simethicone Suspension 30 milliLiter(s) Oral every 6 hours PRN Dyspepsia  hydrOXYzine hydrochloride 25 milliGRAM(s) Oral every 6 hours PRN Anxiety  ibuprofen  Tablet. 600 milliGRAM(s) Oral every 8 hours PRN Moderate Pain (4 - 6)  OLANZapine 5 milliGRAM(s) Oral every 6 hours PRN Severe agitation or psychosis  ondansetron    Tablet 4 milliGRAM(s) Oral every 8 hours PRN nausea  traZODone 50 milliGRAM(s) Oral at bedtime PRN Insomnia

## 2023-12-05 NOTE — CONSULT NOTE ADULT - ASSESSMENT
33F admitted for depression, with history of thyroid cancer with post-treatment hypothyroidism, fibromyalgia, with new complaint of intermittent tingling in hands and feet    Plan:  Paresthesias: Ca WNL.  Possible raynaud's given intermittent nature of symptoms, report of bluish discoloration of toe (not present now)  and coinciding with colder weather.  Advised to keep extremities warm and reassured that symptoms are not dangerous.  Peripheral neuropathy is also possible, patient had been treated with lyrica for fibromyalgia which was discontinued on admission.  There is a withdrawal syndrome from Lyrica but it was discontinued two weeks ago.  Continues on duloxetine.    Hypothyroidism s/p thyroid cancer treatment: TSH WNL on current synthroid dose.  Outpatient endocrinology f/u    Fibromyalgia: Continue duloxetine    Depression: Management per primary team 33F admitted for depression, with history of thyroid cancer with post-treatment hypothyroidism, fibromyalgia, with new complaint of intermittent tingling in hands and feet    Plan:  Paresthesias: Ca WNL.  Possible raynaud's given intermittent nature of symptoms, report of bluish discoloration of toe (not present now)  and coinciding with colder weather.  Advised to keep extremities warm and reassured that symptoms are not dangerous.  Peripheral neuropathy is also possible, patient had been treated with lyrica for fibromyalgia which was discontinued on admission.  There is a withdrawal syndrome from Lyrica but it was discontinued two weeks ago.  Continues on duloxetine.    Hypothyroidism s/p thyroid cancer treatment: TSH WNL on current synthroid dose.  Outpatient endocrinology f/u    Fibromyalgia: Continue duloxetine    Asthma: Continue mometasone inhaler    Depression: Management per primary team

## 2023-12-05 NOTE — BH INPATIENT PSYCHIATRY PROGRESS NOTE - NSBHMETABOLIC_PSY_ALL_CORE_FT
BMI: BMI (kg/m2): 29.1 (11-14-23 @ 15:15)  HbA1c: A1C with Estimated Average Glucose Result: 5.1 % (11-15-23 @ 09:30)    Glucose:   BP: --Vital Signs Last 24 Hrs  T(C): 36.8 (12-05-23 @ 07:37), Max: 36.8 (12-05-23 @ 07:37)  T(F): 98.3 (12-05-23 @ 07:37), Max: 98.3 (12-05-23 @ 07:37)  HR: --  BP: --  BP(mean): --  RR: 18 (12-05-23 @ 07:37) (18 - 18)  SpO2: 99% (12-05-23 @ 07:37) (99% - 99%)    Orthostatic VS  12-05-23 @ 07:37  Lying BP: --/-- HR: --  Sitting BP: 118/77 HR: 87  Standing BP: 110/69 HR: 84  Site: --  Mode: --  Orthostatic VS  12-04-23 @ 07:56  Lying BP: --/-- HR: --  Sitting BP: 107/58 HR: 76  Standing BP: 104/58 HR: 76  Site: --  Mode: --    Lipid Panel: Date/Time: 11-15-23 @ 09:30  Cholesterol, Serum: 201  LDL Cholesterol Calculated: 121  HDL Cholesterol, Serum: 47  Total Cholesterol/HDL Ration Measurement: --  Triglycerides, Serum: 165

## 2023-12-05 NOTE — BH INPATIENT PSYCHIATRY PROGRESS NOTE - NSBHASSESSSUMMFT_PSY_ALL_CORE
Patient is a 34 y/o  woman, she is  with a 4y/o, currently domiciled with her , unemployed and supported by her family, patient is Hungarian speaker; that was admitted initially to the medical floor after she tried to commit suicide by OD on Wellbutrin. Patient has been medically clear and later on transfer to the unit 2W.    Today, pt with multiple somatic complaints including nausea after breakfast this AM and tingling in hands and heels that pt associates with low calcium levels in the past. Ca+ = 8.5 and CMP WNL on 12/5/23. Medicine consulted and to assess pt - pending recs. Will c/w increase Cymbalta to 120 mg PO QD.     Plan:  1. Admit to the unit 2W on a voluntary status   2. Q 15 min checks   3. Medications      increase Zoloft to 100mg daily      d/c Gabapentin 100mg TID     d/c Prazosin 1 mg at bedtime      increase topamax to 50 mg PO QHS   4. Medical team to evaluate the patient as needed.  5. SW to coordinate a safe discharge plan. Patient is a 32 y/o  woman, she is  with a 2y/o, currently domiciled with her , unemployed and supported by her family, patient is Bulgarian speaker; that was admitted initially to the medical floor after she tried to commit suicide by OD on Wellbutrin. Patient has been medically clear and later on transfer to the unit 2W.    Today, pt with multiple somatic complaints including nausea after breakfast this AM and tingling in hands and heels that pt associates with low calcium levels in the past. Ca+ = 8.5 and CMP WNL on 12/5/23. Medicine consulted and to assess pt - pending recs. Will c/w increase Cymbalta to 120 mg PO QD.     Plan:  1. Admit to the unit 2W on a voluntary status   2. Q 15 min checks   3. Medications      increase Zoloft to 100mg daily      d/c Gabapentin 100mg TID     d/c Prazosin 1 mg at bedtime      increase topamax to 50 mg PO QHS   4. Medical team to evaluate the patient as needed.  5. SW to coordinate a safe discharge plan.

## 2023-12-06 PROCEDURE — 99232 SBSQ HOSP IP/OBS MODERATE 35: CPT

## 2023-12-06 RX ADMIN — Medication 5 MILLIGRAM(S): at 21:07

## 2023-12-06 RX ADMIN — Medication 1 MILLIGRAM(S): at 10:12

## 2023-12-06 RX ADMIN — LIDOCAINE 1 APPLICATION(S): 4 CREAM TOPICAL at 21:09

## 2023-12-06 RX ADMIN — NALTREXONE HYDROCHLORIDE 50 MILLIGRAM(S): 50 TABLET, FILM COATED ORAL at 09:08

## 2023-12-06 RX ADMIN — MOMETASONE FUROATE 2 PUFF(S): 220 INHALANT RESPIRATORY (INHALATION) at 21:09

## 2023-12-06 RX ADMIN — Medication 5 MILLIGRAM(S): at 09:09

## 2023-12-06 RX ADMIN — Medication 175 MICROGRAM(S): at 06:33

## 2023-12-06 RX ADMIN — LIDOCAINE 1 APPLICATION(S): 4 CREAM TOPICAL at 09:12

## 2023-12-06 RX ADMIN — MOMETASONE FUROATE 2 PUFF(S): 220 INHALANT RESPIRATORY (INHALATION) at 09:06

## 2023-12-06 RX ADMIN — Medication 50 MILLIGRAM(S): at 21:07

## 2023-12-06 RX ADMIN — PANTOPRAZOLE SODIUM 40 MILLIGRAM(S): 20 TABLET, DELAYED RELEASE ORAL at 09:08

## 2023-12-06 RX ADMIN — SERTRALINE 100 MILLIGRAM(S): 25 TABLET, FILM COATED ORAL at 09:08

## 2023-12-06 RX ADMIN — DULOXETINE HYDROCHLORIDE 120 MILLIGRAM(S): 30 CAPSULE, DELAYED RELEASE ORAL at 09:07

## 2023-12-06 RX ADMIN — Medication 3 MILLIGRAM(S): at 21:08

## 2023-12-06 NOTE — BH TREATMENT PLAN - NSTXDCOPLKINTERSW_PSY_ALL_CORE
This writer will encourage pt to take medications as perscribed to better the chances of psychiatric stability in the community.
This writer will encourage pt to take medications as perscribed to better the chances of psychiatric stability in the community.
This writer will make the appropriate o/p referrals to better the chances of psychiatric stability in the community.

## 2023-12-06 NOTE — BH TREATMENT PLAN - NSCMSPTSTRENGTHS_PSY_ALL_CORE
----- Message from David Cabrera MD sent at 5/26/2022 12:19 AM EDT -----  Call mammogram [facility]
Sabina Hatchet informed by Phone.
Supportive family
Supportive family
Motivated/Supportive family
Motivated/Strong support system/Supportive family

## 2023-12-06 NOTE — BH TREATMENT PLAN - NSBHPRIMARYDX_PSY_ALL_CORE
MDD (major depressive disorder)    

## 2023-12-06 NOTE — BH INPATIENT PSYCHIATRY PROGRESS NOTE - NSBHASSESSSUMMFT_PSY_ALL_CORE
Patient is a 32 y/o  woman, she is  with a 2y/o, currently domiciled with her , unemployed and supported by her family, patient is Mongolian speaker; that was admitted initially to the medical floor after she tried to commit suicide by OD on Wellbutrin. Patient has been medically clear and later on transfer to the unit 2W.    Today, pt reports continued depression, mood dysregulation, somatic preoccupation, disclosed multiple traumatic events, states she would like to disclose these events to her mother, support provided. Will increase Cymbalta to 120 mg PO QD. Will add abilify for mood dysregulation     Plan:  1. Admit to the unit 2W on a voluntary status   2. Q 15 min checks   3. Medications      increase Zoloft to 100mg daily      d/c Gabapentin 100mg TID     d/c Prazosin 1 mg at bedtime      increase topamax to 50 mg PO QHS   4. Medical team to evaluate the patient as needed.  5. SW to coordinate a safe discharge plan. Patient is a 32 y/o  woman, she is  with a 2y/o, currently domiciled with her , unemployed and supported by her family, patient is Greenlandic speaker; that was admitted initially to the medical floor after she tried to commit suicide by OD on Wellbutrin. Patient has been medically clear and later on transfer to the unit 2W.    Today, pt reports continued depression, mood dysregulation, somatic preoccupation, disclosed multiple traumatic events, states she would like to disclose these events to her mother, support provided. Will increase Cymbalta to 120 mg PO QD. Will add abilify for mood dysregulation     Plan:  1. Admit to the unit 2W on a voluntary status   2. Q 15 min checks   3. Medications      increase Zoloft to 100mg daily      d/c Gabapentin 100mg TID     d/c Prazosin 1 mg at bedtime      increase topamax to 50 mg PO QHS   4. Medical team to evaluate the patient as needed.  5. SW to coordinate a safe discharge plan.

## 2023-12-06 NOTE — BH TREATMENT PLAN - NSTXPATIENTPARTICIPATE_PSY_ALL_CORE
Patient participated in identification of needs/problems/goals for treatment
No

## 2023-12-06 NOTE — BH INPATIENT PSYCHIATRY PROGRESS NOTE - NSBHATTESTBILLING_PSY_A_CORE
31660-[id number] OBS or IP - moderate complexity OR 35-49 mins [id number] OBS or IP - moderate complexity OR 35-49 mins

## 2023-12-06 NOTE — BH TREATMENT PLAN - NSTXDEPRESINTERPR_PSY_ALL_CORE
Over the next 7 days, Psychiatric Rehabilitation staff will utilize group and individual psychotherapy, and psychoeducation to assist the patient in reaching her treatment goal.
Over the next 7 days, Psychiatric Rehabilitation staff will utilize group and individual psychotherapy, and psychoeducation to assist the patient in reaching her treatment goal.
Patient made partial progress in regards to her rehabilitation goals. Patient reported that when she has “negative thoughts” this makes her feel more depressed, though she did not elaborate on these thoughts. Patient was able to identify effective coping skills such as going for a walk, exercise, and engaging in art. Psychiatric rehabilitation staff will provide encouragement, support, and psychoeducation to assist the patient in the progression of her treatment goal.
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
## 2023-12-06 NOTE — BH TREATMENT PLAN - NSTXANXINTERMD_PSY_ALL_CORE
med management, psychoed 

## 2023-12-06 NOTE — BH TREATMENT PLAN - NSTXPLANTHERAPYSESSIONSFT_PSY_ALL_CORE
11-30-23  Type of therapy: Safety planning.  Type of session: Individual  Level of patient participation: Engaged  Duration of participation: 15 minutes  Therapy conducted by: Psych rehab  Therapy Summary: Patient was engaged in safety planning and was appropriately participatory.  All items were completed and will be reviewed and finalized upon discharge. The patient is Salvadorean speaking, [employer] Language Line was used throughout the session,  Sarah Beth #[id number].    12-06-23  Type of therapy: Dialectical behavior therapy, Creative arts therapy, Leisure development, Mindfulness, Peer advocate, Symptom management  Type of session: Individual  Level of patient participation: Engaged  Duration of participation: 15 minutes  Therapy conducted by: Psych rehab  Therapy Summary: Writer met with patient to review progress toward rehabilitation goals and assess current functioning. Patient was verbal and cooperative with writer, though during individual session she was restless and somewhat guarded. Patient appears well groomed and dressed appropriately. In regards to patient’s rehab goals, patient reported that utilizing skills such as going for a walk, painting, and working on avoiding negative thoughts that make her feel more depressed. Patient reported feeling better, but informed this writer that she does not feel ready for discharge yet, as she feels as though she needs more time. Patient has been pleasant and cooperative on the unit and has been a behavioral management issue. In regards to groups, patient attended most rehabilitation groups this week. Patient has been able to tolerate full group sessions; and participates appropriately. Patient generally keeps to herself on the unit, with minimal interactions with peers, which may be due to the language barrier.  Psych rehab has offered to make worksheets for patient in Salvadorean so that she could follow along in group, though patient reported that she can read in English. Patient has been compliant with her medication regimen and denied AH/VH/HI/SI. Psych rehab staff will continue to provide ongoing support and encouragement.     11-30-23  Type of therapy: Safety planning.  Type of session: Individual  Level of patient participation: Engaged  Duration of participation: 15 minutes  Therapy conducted by: Psych rehab  Therapy Summary: Patient was engaged in safety planning and was appropriately participatory.  All items were completed and will be reviewed and finalized upon discharge. The patient is Ukrainian speaking, [employer] Language Line was used throughout the session,  Sarah Beth #[id number].    12-06-23  Type of therapy: Dialectical behavior therapy, Creative arts therapy, Leisure development, Mindfulness, Peer advocate, Symptom management  Type of session: Individual  Level of patient participation: Engaged  Duration of participation: 15 minutes  Therapy conducted by: Psych rehab  Therapy Summary: Writer met with patient to review progress toward rehabilitation goals and assess current functioning. Patient was verbal and cooperative with writer, though during individual session she was restless and somewhat guarded. Patient appears well groomed and dressed appropriately. In regards to patient’s rehab goals, patient reported that utilizing skills such as going for a walk, painting, and working on avoiding negative thoughts that make her feel more depressed. Patient reported feeling better, but informed this writer that she does not feel ready for discharge yet, as she feels as though she needs more time. Patient has been pleasant and cooperative on the unit and has been a behavioral management issue. In regards to groups, patient attended most rehabilitation groups this week. Patient has been able to tolerate full group sessions; and participates appropriately. Patient generally keeps to herself on the unit, with minimal interactions with peers, which may be due to the language barrier.  Psych rehab has offered to make worksheets for patient in Ukrainian so that she could follow along in group, though patient reported that she can read in English. Patient has been compliant with her medication regimen and denied AH/VH/HI/SI. Psych rehab staff will continue to provide ongoing support and encouragement.

## 2023-12-06 NOTE — BH INPATIENT PSYCHIATRY PROGRESS NOTE - NSBHCHARTREVIEWVS_PSY_A_CORE FT
Vital Signs Last 24 Hrs  T(C): 36.4 (12-06-23 @ 07:49), Max: 36.4 (12-06-23 @ 07:49)  T(F): 97.6 (12-06-23 @ 07:49), Max: 97.6 (12-06-23 @ 07:49)  HR: --  BP: --  BP(mean): --  RR: 17 (12-06-23 @ 07:49) (17 - 17)  SpO2: 100% (12-06-23 @ 07:49) (100% - 100%)    Orthostatic VS  12-06-23 @ 07:49  Lying BP: --/-- HR: --  Sitting BP: 106/77 HR: 72  Standing BP: 100/76 HR: 70  Site: --  Mode: --  Orthostatic VS  12-05-23 @ 07:37  Lying BP: --/-- HR: --  Sitting BP: 118/77 HR: 87  Standing BP: 110/69 HR: 84  Site: --  Mode: --

## 2023-12-06 NOTE — BH TREATMENT PLAN - NSTXDEPRESGOAL_PSY_ALL_CORE
Will identify 2 coping skills that assist in improving mood
Will identify thoughts and self-talk that contribute to depression

## 2023-12-06 NOTE — BH INPATIENT PSYCHIATRY PROGRESS NOTE - NSBHMETABOLIC_PSY_ALL_CORE_FT
BMI: BMI (kg/m2): 29.1 (11-14-23 @ 15:15)  HbA1c: A1C with Estimated Average Glucose Result: 5.1 % (11-15-23 @ 09:30)    Glucose:   BP: --Vital Signs Last 24 Hrs  T(C): 36.4 (12-06-23 @ 07:49), Max: 36.4 (12-06-23 @ 07:49)  T(F): 97.6 (12-06-23 @ 07:49), Max: 97.6 (12-06-23 @ 07:49)  HR: --  BP: --  BP(mean): --  RR: 17 (12-06-23 @ 07:49) (17 - 17)  SpO2: 100% (12-06-23 @ 07:49) (100% - 100%)    Orthostatic VS  12-06-23 @ 07:49  Lying BP: --/-- HR: --  Sitting BP: 106/77 HR: 72  Standing BP: 100/76 HR: 70  Site: --  Mode: --  Orthostatic VS  12-05-23 @ 07:37  Lying BP: --/-- HR: --  Sitting BP: 118/77 HR: 87  Standing BP: 110/69 HR: 84  Site: --  Mode: --    Lipid Panel: Date/Time: 11-15-23 @ 09:30  Cholesterol, Serum: 201  LDL Cholesterol Calculated: 121  HDL Cholesterol, Serum: 47  Total Cholesterol/HDL Ration Measurement: --  Triglycerides, Serum: 165

## 2023-12-07 PROCEDURE — 99232 SBSQ HOSP IP/OBS MODERATE 35: CPT

## 2023-12-07 RX ORDER — MULTIVIT-MIN/FERROUS GLUCONATE 9 MG/15 ML
1 LIQUID (ML) ORAL DAILY
Refills: 0 | Status: DISCONTINUED | OUTPATIENT
Start: 2023-12-07 | End: 2023-12-07

## 2023-12-07 RX ORDER — MULTIVIT-MIN/FERROUS GLUCONATE 9 MG/15 ML
1 LIQUID (ML) ORAL AT BEDTIME
Refills: 0 | Status: DISCONTINUED | OUTPATIENT
Start: 2023-12-07 | End: 2023-12-13

## 2023-12-07 RX ORDER — ARIPIPRAZOLE 15 MG/1
5 TABLET ORAL ONCE
Refills: 0 | Status: COMPLETED | OUTPATIENT
Start: 2023-12-07 | End: 2023-12-07

## 2023-12-07 RX ORDER — ARIPIPRAZOLE 15 MG/1
5 TABLET ORAL DAILY
Refills: 0 | Status: DISCONTINUED | OUTPATIENT
Start: 2023-12-08 | End: 2023-12-13

## 2023-12-07 RX ADMIN — SERTRALINE 100 MILLIGRAM(S): 25 TABLET, FILM COATED ORAL at 08:18

## 2023-12-07 RX ADMIN — MOMETASONE FUROATE 2 PUFF(S): 220 INHALANT RESPIRATORY (INHALATION) at 20:13

## 2023-12-07 RX ADMIN — Medication 175 MICROGRAM(S): at 06:15

## 2023-12-07 RX ADMIN — LIDOCAINE 1 APPLICATION(S): 4 CREAM TOPICAL at 09:26

## 2023-12-07 RX ADMIN — Medication 50 MILLIGRAM(S): at 20:38

## 2023-12-07 RX ADMIN — ARIPIPRAZOLE 5 MILLIGRAM(S): 15 TABLET ORAL at 12:55

## 2023-12-07 RX ADMIN — Medication 5 MILLIGRAM(S): at 08:19

## 2023-12-07 RX ADMIN — PANTOPRAZOLE SODIUM 40 MILLIGRAM(S): 20 TABLET, DELAYED RELEASE ORAL at 08:18

## 2023-12-07 RX ADMIN — Medication 3 MILLIGRAM(S): at 20:13

## 2023-12-07 RX ADMIN — Medication 25 MILLIGRAM(S): at 12:55

## 2023-12-07 RX ADMIN — Medication 5 MILLIGRAM(S): at 20:13

## 2023-12-07 RX ADMIN — MOMETASONE FUROATE 2 PUFF(S): 220 INHALANT RESPIRATORY (INHALATION) at 09:26

## 2023-12-07 RX ADMIN — Medication 1 TABLET(S): at 20:13

## 2023-12-07 RX ADMIN — Medication 1 MILLIGRAM(S): at 08:19

## 2023-12-07 RX ADMIN — DULOXETINE HYDROCHLORIDE 120 MILLIGRAM(S): 30 CAPSULE, DELAYED RELEASE ORAL at 09:25

## 2023-12-07 RX ADMIN — NALTREXONE HYDROCHLORIDE 50 MILLIGRAM(S): 50 TABLET, FILM COATED ORAL at 09:25

## 2023-12-07 RX ADMIN — LIDOCAINE 1 APPLICATION(S): 4 CREAM TOPICAL at 20:13

## 2023-12-07 NOTE — BH PSYCHOLOGY - GROUP THERAPY NOTE - NSPSYCHOLGRPCOGGOAL_PSY_A_CORE
develop improved problem solving skills/learn cognitive skills to improve coping with stress
reduce reaction to psychosocial stressors/develop improved problem solving skills/learn cognitive skills to improve coping with stress/other...
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
## 2023-12-07 NOTE — BH PSYCHOLOGY - CLINICIAN PSYCHOTHERAPY NOTE - TOKEN PULL-DIAGNOSIS
Primary Diagnosis:  MDD (major depressive disorder) [F32.9]        Problem Dx:   H/O fibromyalgia [Z87.39]      Thyroid cancer [C73]      Asthma [J45.909]

## 2023-12-07 NOTE — BH PSYCHOLOGY - GROUP THERAPY NOTE - NSPSYCHOLGRPCOGINT_PSY_A_CORE
group members provided support/group members suggested positive behaviors/explore reducing vulnerability to stress/cognitive restructuring/mindfulness skills taught/relaxation skills practiced/other..
group members provided support/cognitive restructuring/behavioral distress tolerance skills taught/mindfulness skills taught

## 2023-12-07 NOTE — BH PSYCHOLOGY - GROUP THERAPY NOTE - NSBHPSYCHOLRESPONSE_PSY_A_CORE
Coping skills acquired/Accepted support
Accepted support
Coping skills acquired/Accepted support
Coping skills acquired/Accepted support
Accepted support
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
## 2023-12-07 NOTE — BH INPATIENT PSYCHIATRY PROGRESS NOTE - NSBHMETABOLIC_PSY_ALL_CORE_FT
BMI: BMI (kg/m2): 29.1 (11-14-23 @ 15:15)  HbA1c: A1C with Estimated Average Glucose Result: 5.1 % (11-15-23 @ 09:30)    Glucose:   BP: --Vital Signs Last 24 Hrs  T(C): 36.4 (12-07-23 @ 07:18), Max: 36.4 (12-07-23 @ 07:18)  T(F): 97.6 (12-07-23 @ 07:18), Max: 97.6 (12-07-23 @ 07:18)  HR: --  BP: --  BP(mean): --  RR: 18 (12-07-23 @ 07:18) (18 - 18)  SpO2: --    Orthostatic VS  12-07-23 @ 07:18  Lying BP: --/-- HR: --  Sitting BP: 114/72 HR: 78  Standing BP: 106/69 HR: 98  Site: --  Mode: --  Orthostatic VS  12-06-23 @ 07:49  Lying BP: --/-- HR: --  Sitting BP: 106/77 HR: 72  Standing BP: 100/76 HR: 70  Site: --  Mode: --    Lipid Panel: Date/Time: 11-15-23 @ 09:30  Cholesterol, Serum: 201  LDL Cholesterol Calculated: 121  HDL Cholesterol, Serum: 47  Total Cholesterol/HDL Ration Measurement: --  Triglycerides, Serum: 165

## 2023-12-07 NOTE — BH INPATIENT PSYCHIATRY PROGRESS NOTE - CURRENT MEDICATION
MEDICATIONS  (STANDING):  DULoxetine 120 milliGRAM(s) Oral daily  folic acid 1 milliGRAM(s) Oral daily  levothyroxine 175 MICROGram(s) Oral daily  lidocaine 5% Ointment 1 Application(s) Topical two times a day  melatonin. 3 milliGRAM(s) Oral at bedtime  mometasone 220 MICROgram(s) Inhaler 2 Puff(s) Inhalation two times a day  multivitamin/minerals 1 Tablet(s) Oral at bedtime  naltrexone 50 milliGRAM(s) Oral daily  oxybutynin 5 milliGRAM(s) Oral two times a day  pantoprazole    Tablet 40 milliGRAM(s) Oral before breakfast  pregabalin 25 milliGRAM(s) Oral daily  sertraline 100 milliGRAM(s) Oral daily  topiramate 50 milliGRAM(s) Oral at bedtime    MEDICATIONS  (PRN):  acetaminophen     Tablet .. 650 milliGRAM(s) Oral every 6 hours PRN Mild Pain (1 - 3)  albuterol    90 MICROgram(s) HFA Inhaler 2 Puff(s) Inhalation every 6 hours PRN asthma  aluminum hydroxide/magnesium hydroxide/simethicone Suspension 30 milliLiter(s) Oral every 6 hours PRN Dyspepsia  hydrOXYzine hydrochloride 25 milliGRAM(s) Oral every 6 hours PRN Anxiety  ibuprofen  Tablet. 600 milliGRAM(s) Oral every 8 hours PRN Moderate Pain (4 - 6)  OLANZapine 5 milliGRAM(s) Oral every 6 hours PRN Severe agitation or psychosis  ondansetron    Tablet 4 milliGRAM(s) Oral every 8 hours PRN nausea  traZODone 50 milliGRAM(s) Oral at bedtime PRN Insomnia

## 2023-12-07 NOTE — BH INPATIENT PSYCHIATRY PROGRESS NOTE - NSBHATTESTBILLING_PSY_A_CORE
[id number] OBS or IP - moderate complexity OR 35-49 mins [id number] OBS or IP - moderate complexity OR 35-49 mins

## 2023-12-07 NOTE — BH PSYCHOLOGY - GROUP THERAPY NOTE - NSBHPSYCHOLGRPTYPE_PSY_A_CORE
DBT Life Skills
DBT Life Skills
Cognitive Behavioral Coping Skills
Cognitive Behavioral Coping Skills
DBT Life Skills

## 2023-12-07 NOTE — PHARMACOTHERAPY INTERVENTION NOTE - NSPHARMCOMMMEDSAFE
Drug interaction - Therapy discontinued/ changed

## 2023-12-07 NOTE — BH PSYCHOLOGY - GROUP THERAPY NOTE - NSPSYCHOLGRPCOGPT_PSY_A_CORE
participated in exercise/shared positive coping experience/other...
shared positive coping experience/other...
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
## 2023-12-07 NOTE — BH PSYCHOLOGY - GROUP THERAPY NOTE - NSPSYCHOLGRPCOGPT_PSY_A_CORE FT
Patient attended a Cognitive Behavorial Group focused on Coping Skills for Anxiety. Group began with a brief check in asking pts to share review of skills and/or present emotions. Pt participated in a mindfulness exercise and were encouraged to share thought/reactions. In this group individuals learn and practice coping skills for anxiety, including deep breathing, guided imagery, progressive muscle relaxation, and challenging irrational thoughts. The group combined demonstrations and group practice to enhance participant’s ability to manage anxiety.  The structured group approach encouraged conversation, sharing experiences and assigned homework for independent skill application outside the group setting. The group was to empower individuals with effective tools for anxiety reduction. Group facilitator explained concepts, reinforced participation, and engaged patients in discussion. 
 In a Cognitive Behavioral Therapy group (CBT) employing the FACE model, individuals collaboratively explore avoidance behaviors. Group began with a brief check in asking pts to share review of skills and/or present emotions. Pt participated in a mindfulness exercise and were encouraged to share thought/reactions. The group continued with participants identifying specific triggers or situations they avoid (Finding Your Target). Subsequently, they set actionable goals and break them into manageable tasks (Action). Coping strategies, such as mindfulness and cognitive restructuring are introduced to manage anxiety or other intense emotions (Coping). Patients are in a supportive environment for reflection and goal assessment (Evaluate). The group dynamic encourages mutual support, making the structured approach effective and empowering individuals to approach their emotions and build skills. Group facilitator explained concepts, reinforced participation, and engaged patients in discussion.

## 2023-12-07 NOTE — BH INPATIENT PSYCHIATRY PROGRESS NOTE - NSBHCHARTREVIEWVS_PSY_A_CORE FT
Vital Signs Last 24 Hrs  T(C): 36.4 (12-07-23 @ 07:18), Max: 36.4 (12-07-23 @ 07:18)  T(F): 97.6 (12-07-23 @ 07:18), Max: 97.6 (12-07-23 @ 07:18)  HR: --  BP: --  BP(mean): --  RR: 18 (12-07-23 @ 07:18) (18 - 18)  SpO2: --    Orthostatic VS  12-07-23 @ 07:18  Lying BP: --/-- HR: --  Sitting BP: 114/72 HR: 78  Standing BP: 106/69 HR: 98  Site: --  Mode: --  Orthostatic VS  12-06-23 @ 07:49  Lying BP: --/-- HR: --  Sitting BP: 106/77 HR: 72  Standing BP: 100/76 HR: 70  Site: --  Mode: --

## 2023-12-07 NOTE — BH PSYCHOLOGY - GROUP THERAPY NOTE - NSBHPSYCHOLGRPNAME_PSY_A_CORE
CBT (Cognitive Behavioral Therapy) Group
DBT (Dialectical Behavior Therapy) Group
DBT (Dialectical Behavior Therapy) Group
CBT (Cognitive Behavioral Therapy) Group
DBT (Dialectical Behavior Therapy) Group

## 2023-12-07 NOTE — PHARMACOTHERAPY INTERVENTION NOTE - COMMENTS
Concurrent use of LEVOTHYROXINE, ANTACIDS (e.g., magnesium and aluminum), and SIMETHICONE may result in decreased levothyroxine effectiveness.    Added administration instruction to PRN order for aluminum hydroxide/magnesium hydroxide/simethicone suspension to "Do not administer within 4 hours of levothyroxine."    Anahi Toro PharmD, BCPP  
Concurrent administration of levothyroxine and other medications within 1 hour may result in decreased levothyroxine effectiveness.    Since levothyroxine administration time defaults to 0600 and pantoprazole administration time defaults to 0730, there is the potential for the medications to be administered together. Added administration instruction to pantoprazole order to "Administer at least 1 hour AFTER levothyroxine."    Anahi Toro, JuliethD, BCPP  
In monitoring patients on levothyroxine for drug-drug interactions and timing of administration, identified patient with an order for multivitamin with minerals, which contains calcium and iron, scheduled for daily at 0900. Levothyroxine defaults to daily at 0600. Concurrent use of levothyroxine and iron-, aluminum-, calcium- or magnesium-containing products may result in decreased levothyroxine absorption and must be  from levothyroxine by at least 4 hours.  Recommended adjusting dosing schedule for multivitamin/minerals to bedtime to avoid the interaction, as clinically appropriate.

## 2023-12-07 NOTE — BH PSYCHOLOGY - GROUP THERAPY NOTE - NSBHPSYCHOLRESPCOMMENT_PSY_A_CORE FT
The patient appeared adequately groomed and casually dressed. Pt was engaged in the group as evidenced by participating in group discussion. Pt volunteered to read from the worksheet when given the opportunity. Pt was oriented X3. Patient was open to feedback, and she was appropriate with her peers. Pt found the group to be helpful. Pt was encouraged to practice the skill.  
The patient appeared adequately groomed and casually dressed. Pt was partially engaged in the group as evidenced by reading from the worksheet. Pt was oriented X3. Pt was appropriate with her peers and stayed for the entirety of group. Pt was encouraged to practice skill for symptoms reduction. 
The patient appeared adequately groomed and casually dressed. Pt was partially engaged in the group as evidenced by reading from the worksheet. Pt volunteered to read from the worksheet when given the opportunity. Pt was oriented X3. Pt was appropriate with her peers. Pt stayed for the entirety of group.
The patient appeared adequately groomed and casually dressed. Pt was engaged in the group as evidenced by participating in group discussion. Pt volunteered to read from the worksheet when given the opportunity. Pt was oriented X3. Patient was open to feedback, and she was appropriate with her peers. Pt found the group to be helpful. Pt was encouraged to practice the skill. Pt shared how she may use the skill of paced breathing and temperature to impact how she relates to her pain and anxiety.
The patient appeared adequately groomed and casually dressed. Pt was engaged in the group as evidenced by participating in group discussion. Pt volunteered to read from the worksheet when given the opportunity. Pt was oriented X3. Patient was open to feedback, and she was appropriate with her peers. Pt found the group to be helpful. Pt was encouraged to practice the skill.

## 2023-12-07 NOTE — BH PSYCHOLOGY - GROUP THERAPY NOTE - NSBHPSYCHOLPARTICIPCOMMENT_PSY_A_CORE FT
Pt independently participated
Pt participated in discussion and cognitive exercise. 
Pt independently participated
Pt independently participated. 
Pt volunteered to read from the worksheet, but did not participate in group discussion.

## 2023-12-07 NOTE — BH PSYCHOLOGY - GROUP THERAPY NOTE - NSPSYCHOLGRPCOGPROB_PSY_A_CORE
anxiety/cognitive distortions/impaired problem solving skills/isolation/lack of behaviors to reduce symptoms/other...
anxiety/impaired problem solving skills

## 2023-12-08 PROCEDURE — 99232 SBSQ HOSP IP/OBS MODERATE 35: CPT

## 2023-12-08 RX ADMIN — Medication 3 MILLIGRAM(S): at 21:26

## 2023-12-08 RX ADMIN — LIDOCAINE 1 APPLICATION(S): 4 CREAM TOPICAL at 09:41

## 2023-12-08 RX ADMIN — Medication 175 MICROGRAM(S): at 06:25

## 2023-12-08 RX ADMIN — Medication 1 MILLIGRAM(S): at 09:13

## 2023-12-08 RX ADMIN — Medication 50 MILLIGRAM(S): at 21:26

## 2023-12-08 RX ADMIN — Medication 25 MILLIGRAM(S): at 09:13

## 2023-12-08 RX ADMIN — DULOXETINE HYDROCHLORIDE 120 MILLIGRAM(S): 30 CAPSULE, DELAYED RELEASE ORAL at 09:12

## 2023-12-08 RX ADMIN — Medication 1 TABLET(S): at 21:26

## 2023-12-08 RX ADMIN — ARIPIPRAZOLE 5 MILLIGRAM(S): 15 TABLET ORAL at 09:12

## 2023-12-08 RX ADMIN — PANTOPRAZOLE SODIUM 40 MILLIGRAM(S): 20 TABLET, DELAYED RELEASE ORAL at 09:13

## 2023-12-08 RX ADMIN — Medication 5 MILLIGRAM(S): at 21:26

## 2023-12-08 RX ADMIN — LIDOCAINE 1 APPLICATION(S): 4 CREAM TOPICAL at 21:27

## 2023-12-08 RX ADMIN — MOMETASONE FUROATE 2 PUFF(S): 220 INHALANT RESPIRATORY (INHALATION) at 21:27

## 2023-12-08 RX ADMIN — MOMETASONE FUROATE 2 PUFF(S): 220 INHALANT RESPIRATORY (INHALATION) at 09:41

## 2023-12-08 RX ADMIN — Medication 5 MILLIGRAM(S): at 09:13

## 2023-12-08 RX ADMIN — NALTREXONE HYDROCHLORIDE 50 MILLIGRAM(S): 50 TABLET, FILM COATED ORAL at 09:13

## 2023-12-08 RX ADMIN — SERTRALINE 100 MILLIGRAM(S): 25 TABLET, FILM COATED ORAL at 09:13

## 2023-12-08 NOTE — BH INPATIENT PSYCHIATRY PROGRESS NOTE - NSBHCHARTREVIEWVS_PSY_A_CORE FT
Vital Signs Last 24 Hrs  T(C): 36.7 (12-08-23 @ 08:13), Max: 36.7 (12-08-23 @ 08:13)  T(F): 98.1 (12-08-23 @ 08:13), Max: 98.1 (12-08-23 @ 08:13)  HR: --  BP: --  BP(mean): --  RR: 20 (12-08-23 @ 08:13) (20 - 20)  SpO2: --    Orthostatic VS  12-08-23 @ 08:13  Lying BP: --/-- HR: --  Sitting BP: 118/71 HR: 90  Standing BP: 108/70 HR: 100  Site: --  Mode: --  Orthostatic VS  12-07-23 @ 07:18  Lying BP: --/-- HR: --  Sitting BP: 114/72 HR: 78  Standing BP: 106/69 HR: 98  Site: --  Mode: --

## 2023-12-08 NOTE — BH INPATIENT PSYCHIATRY PROGRESS NOTE - NSBHFUPINTERVALHXFT_PSY_A_CORE
Pt assessed for depression s/p SA via OD on wellbutrin. Chart reviewed and case discussed with treatment team. No interval events reported overnight.     Patient was seen and evaluated in the day area by her self, this was a face to face evaluation. Patient was interview in Kazakh by this writer as patient refused the use of an . Patient reported that she is starting to improve as she feels less depressed and anxious. She continues to feel as if she has a lot of feelings "bottle" inside of her chest that she wants to let go. At the same time she is following the advised of the psychologist (processing her symptoms and feelings slowly). She not longer endorse having SI and has been able to contract for safety. Stated that the Abilify made her very sleepy and she was able to have a good rest this morning. She didn't complained of pain or any numbness in her feet or fingers. Patient overall looked bright, she was responding to humor and seemed to be more engaged.  Pt assessed for depression s/p SA via OD on wellbutrin. Chart reviewed and case discussed with treatment team. No interval events reported overnight.     Patient was seen and evaluated in the day area by her self, this was a face to face evaluation. Patient was interview in Swedish by this writer as patient refused the use of an . Patient reported that she is starting to improve as she feels less depressed and anxious. She continues to feel as if she has a lot of feelings "bottle" inside of her chest that she wants to let go. At the same time she is following the advised of the psychologist (processing her symptoms and feelings slowly). She not longer endorse having SI and has been able to contract for safety. Stated that the Abilify made her very sleepy and she was able to have a good rest this morning. She didn't complained of pain or any numbness in her feet or fingers. Patient overall looked bright, she was responding to humor and seemed to be more engaged.

## 2023-12-08 NOTE — BH INPATIENT PSYCHIATRY PROGRESS NOTE - NSBHASSESSSUMMFT_PSY_ALL_CORE
Patient is a 32 y/o  woman, she is  with a 4y/o, currently domiciled with her , unemployed and supported by her family, patient is Occitan speaker; that was admitted initially to the medical floor after she tried to commit suicide by OD on Wellbutrin. Patient has been medically clear and later on transfer to the unit 2W.    Today, pt reports slight improvement in depression, remains with somatic preoccupation, disclosed multiple traumatic events, states she would like to disclose these events to her mother - pt to meet with psychology fellow to discuss. Start abilify 5 mg PO QD for mood dysregulation and lyrica 25 mg PO QD for continued tingling in hands and feet     Plan:  1. Admit to the unit 2W on a voluntary status   2. Q 15 min checks   3. Medications   ARIPiprazole 5 milliGRAM(s) Oral daily  DULoxetine 120 milliGRAM(s) Oral daily  folic acid 1 milliGRAM(s) Oral daily  levothyroxine 175 MICROGram(s) Oral daily  melatonin. 3 milliGRAM(s) Oral at bedtime  naltrexone 50 milliGRAM(s) Oral daily  pregabalin 25 milliGRAM(s) Oral daily  sertraline 100 milliGRAM(s) Oral daily  topiramate 50 milliGRAM(s) Oral at bedtime    4. Medical team to evaluate the patient as needed.  5. SW to coordinate a safe discharge plan. Patient is a 34 y/o  woman, she is  with a 2y/o, currently domiciled with her , unemployed and supported by her family, patient is Bel[place]ian speaker; that was admitted initially to the medical floor after she tried to commit suicide by OD on Wellbutrin. Patient has been medically clear and later on transfer to the unit 2W.    Today, pt reports slight improvement in depression, remains with somatic preoccupation, disclosed multiple traumatic events, states she would like to disclose these events to her mother - pt to meet with psychology fellow to discuss. Start abilify 5 mg PO QD for mood dysregulation and lyrica 25 mg PO QD for continued tingling in hands and feet     Plan:  1. Admit to the unit 2W on a voluntary status   2. Q 15 min checks   3. Medications   ARIPiprazole 5 milliGRAM(s) Oral daily  DULoxetine 120 milliGRAM(s) Oral daily  folic acid 1 milliGRAM(s) Oral daily  levothyroxine 175 MICROGram(s) Oral daily  melatonin. 3 milliGRAM(s) Oral at bedtime  naltrexone 50 milliGRAM(s) Oral daily  pregabalin 25 milliGRAM(s) Oral daily  sertraline 100 milliGRAM(s) Oral daily  topiramate 50 milliGRAM(s) Oral at bedtime    4. Medical team to evaluate the patient as needed.  5. SW to coordinate a safe discharge plan.

## 2023-12-08 NOTE — BH INPATIENT PSYCHIATRY PROGRESS NOTE - NSBHMETABOLIC_PSY_ALL_CORE_FT
BMI: BMI (kg/m2): 29.1 (11-14-23 @ 15:15)  HbA1c: A1C with Estimated Average Glucose Result: 5.1 % (11-15-23 @ 09:30)    Glucose:   BP: --Vital Signs Last 24 Hrs  T(C): 36.7 (12-08-23 @ 08:13), Max: 36.7 (12-08-23 @ 08:13)  T(F): 98.1 (12-08-23 @ 08:13), Max: 98.1 (12-08-23 @ 08:13)  HR: --  BP: --  BP(mean): --  RR: 20 (12-08-23 @ 08:13) (20 - 20)  SpO2: --    Orthostatic VS  12-08-23 @ 08:13  Lying BP: --/-- HR: --  Sitting BP: 118/71 HR: 90  Standing BP: 108/70 HR: 100  Site: --  Mode: --  Orthostatic VS  12-07-23 @ 07:18  Lying BP: --/-- HR: --  Sitting BP: 114/72 HR: 78  Standing BP: 106/69 HR: 98  Site: --  Mode: --    Lipid Panel: Date/Time: 11-15-23 @ 09:30  Cholesterol, Serum: 201  LDL Cholesterol Calculated: 121  HDL Cholesterol, Serum: 47  Total Cholesterol/HDL Ration Measurement: --  Triglycerides, Serum: 165

## 2023-12-08 NOTE — BH INPATIENT PSYCHIATRY PROGRESS NOTE - NSBHATTESTBILLING_PSY_A_CORE
65294-[id number] OBS or IP - moderate complexity OR 35-49 mins 75452-Pjlrvoghsz OBS or IP - moderate complexity OR 35-49 mins

## 2023-12-08 NOTE — BH INPATIENT PSYCHIATRY PROGRESS NOTE - CURRENT MEDICATION
MEDICATIONS  (STANDING):  ARIPiprazole 5 milliGRAM(s) Oral daily  DULoxetine 120 milliGRAM(s) Oral daily  folic acid 1 milliGRAM(s) Oral daily  levothyroxine 175 MICROGram(s) Oral daily  lidocaine 5% Ointment 1 Application(s) Topical two times a day  melatonin. 3 milliGRAM(s) Oral at bedtime  mometasone 220 MICROgram(s) Inhaler 2 Puff(s) Inhalation two times a day  multivitamin/minerals 1 Tablet(s) Oral at bedtime  naltrexone 50 milliGRAM(s) Oral daily  oxybutynin 5 milliGRAM(s) Oral two times a day  pantoprazole    Tablet 40 milliGRAM(s) Oral before breakfast  pregabalin 25 milliGRAM(s) Oral daily  sertraline 100 milliGRAM(s) Oral daily  topiramate 50 milliGRAM(s) Oral at bedtime    MEDICATIONS  (PRN):  acetaminophen     Tablet .. 650 milliGRAM(s) Oral every 6 hours PRN Mild Pain (1 - 3)  albuterol    90 MICROgram(s) HFA Inhaler 2 Puff(s) Inhalation every 6 hours PRN asthma  aluminum hydroxide/magnesium hydroxide/simethicone Suspension 30 milliLiter(s) Oral every 6 hours PRN Dyspepsia  hydrOXYzine hydrochloride 25 milliGRAM(s) Oral every 6 hours PRN Anxiety  ibuprofen  Tablet. 600 milliGRAM(s) Oral every 8 hours PRN Moderate Pain (4 - 6)  OLANZapine 5 milliGRAM(s) Oral every 6 hours PRN Severe agitation or psychosis  ondansetron    Tablet 4 milliGRAM(s) Oral every 8 hours PRN nausea  traZODone 50 milliGRAM(s) Oral at bedtime PRN Insomnia

## 2023-12-09 RX ADMIN — PANTOPRAZOLE SODIUM 40 MILLIGRAM(S): 20 TABLET, DELAYED RELEASE ORAL at 08:14

## 2023-12-09 RX ADMIN — DULOXETINE HYDROCHLORIDE 120 MILLIGRAM(S): 30 CAPSULE, DELAYED RELEASE ORAL at 08:13

## 2023-12-09 RX ADMIN — Medication 50 MILLIGRAM(S): at 20:43

## 2023-12-09 RX ADMIN — ARIPIPRAZOLE 5 MILLIGRAM(S): 15 TABLET ORAL at 08:13

## 2023-12-09 RX ADMIN — LIDOCAINE 1 APPLICATION(S): 4 CREAM TOPICAL at 20:42

## 2023-12-09 RX ADMIN — Medication 3 MILLIGRAM(S): at 20:43

## 2023-12-09 RX ADMIN — NALTREXONE HYDROCHLORIDE 50 MILLIGRAM(S): 50 TABLET, FILM COATED ORAL at 08:13

## 2023-12-09 RX ADMIN — SERTRALINE 100 MILLIGRAM(S): 25 TABLET, FILM COATED ORAL at 08:13

## 2023-12-09 RX ADMIN — Medication 650 MILLIGRAM(S): at 20:42

## 2023-12-09 RX ADMIN — Medication 1 MILLIGRAM(S): at 08:13

## 2023-12-09 RX ADMIN — MOMETASONE FUROATE 2 PUFF(S): 220 INHALANT RESPIRATORY (INHALATION) at 08:51

## 2023-12-09 RX ADMIN — Medication 175 MICROGRAM(S): at 06:42

## 2023-12-09 RX ADMIN — MOMETASONE FUROATE 2 PUFF(S): 220 INHALANT RESPIRATORY (INHALATION) at 20:42

## 2023-12-09 RX ADMIN — Medication 1 TABLET(S): at 20:43

## 2023-12-09 RX ADMIN — Medication 25 MILLIGRAM(S): at 08:13

## 2023-12-09 RX ADMIN — Medication 5 MILLIGRAM(S): at 08:13

## 2023-12-09 RX ADMIN — LIDOCAINE 1 APPLICATION(S): 4 CREAM TOPICAL at 08:51

## 2023-12-09 RX ADMIN — Medication 5 MILLIGRAM(S): at 20:43

## 2023-12-10 RX ADMIN — Medication 50 MILLIGRAM(S): at 20:50

## 2023-12-10 RX ADMIN — LIDOCAINE 1 APPLICATION(S): 4 CREAM TOPICAL at 20:52

## 2023-12-10 RX ADMIN — Medication 175 MICROGRAM(S): at 06:25

## 2023-12-10 RX ADMIN — Medication 1 MILLIGRAM(S): at 08:28

## 2023-12-10 RX ADMIN — SERTRALINE 100 MILLIGRAM(S): 25 TABLET, FILM COATED ORAL at 08:27

## 2023-12-10 RX ADMIN — Medication 650 MILLIGRAM(S): at 10:00

## 2023-12-10 RX ADMIN — Medication 25 MILLIGRAM(S): at 08:28

## 2023-12-10 RX ADMIN — DULOXETINE HYDROCHLORIDE 120 MILLIGRAM(S): 30 CAPSULE, DELAYED RELEASE ORAL at 08:28

## 2023-12-10 RX ADMIN — MOMETASONE FUROATE 2 PUFF(S): 220 INHALANT RESPIRATORY (INHALATION) at 09:10

## 2023-12-10 RX ADMIN — Medication 3 MILLIGRAM(S): at 20:50

## 2023-12-10 RX ADMIN — NALTREXONE HYDROCHLORIDE 50 MILLIGRAM(S): 50 TABLET, FILM COATED ORAL at 08:27

## 2023-12-10 RX ADMIN — Medication 1 TABLET(S): at 20:50

## 2023-12-10 RX ADMIN — LIDOCAINE 1 APPLICATION(S): 4 CREAM TOPICAL at 09:10

## 2023-12-10 RX ADMIN — Medication 5 MILLIGRAM(S): at 08:28

## 2023-12-10 RX ADMIN — Medication 5 MILLIGRAM(S): at 20:50

## 2023-12-10 RX ADMIN — ARIPIPRAZOLE 5 MILLIGRAM(S): 15 TABLET ORAL at 08:27

## 2023-12-10 RX ADMIN — Medication 650 MILLIGRAM(S): at 09:09

## 2023-12-10 RX ADMIN — PANTOPRAZOLE SODIUM 40 MILLIGRAM(S): 20 TABLET, DELAYED RELEASE ORAL at 08:27

## 2023-12-10 RX ADMIN — MOMETASONE FUROATE 2 PUFF(S): 220 INHALANT RESPIRATORY (INHALATION) at 20:49

## 2023-12-11 PROCEDURE — 99232 SBSQ HOSP IP/OBS MODERATE 35: CPT

## 2023-12-11 RX ORDER — SERTRALINE 25 MG/1
150 TABLET, FILM COATED ORAL DAILY
Refills: 0 | Status: DISCONTINUED | OUTPATIENT
Start: 2023-12-11 | End: 2023-12-13

## 2023-12-11 RX ADMIN — Medication 3 MILLIGRAM(S): at 20:22

## 2023-12-11 RX ADMIN — SERTRALINE 100 MILLIGRAM(S): 25 TABLET, FILM COATED ORAL at 09:22

## 2023-12-11 RX ADMIN — NALTREXONE HYDROCHLORIDE 50 MILLIGRAM(S): 50 TABLET, FILM COATED ORAL at 09:23

## 2023-12-11 RX ADMIN — Medication 25 MILLIGRAM(S): at 09:23

## 2023-12-11 RX ADMIN — MOMETASONE FUROATE 2 PUFF(S): 220 INHALANT RESPIRATORY (INHALATION) at 09:23

## 2023-12-11 RX ADMIN — MOMETASONE FUROATE 2 PUFF(S): 220 INHALANT RESPIRATORY (INHALATION) at 20:22

## 2023-12-11 RX ADMIN — LIDOCAINE 1 APPLICATION(S): 4 CREAM TOPICAL at 09:23

## 2023-12-11 RX ADMIN — Medication 1 MILLIGRAM(S): at 09:22

## 2023-12-11 RX ADMIN — ARIPIPRAZOLE 5 MILLIGRAM(S): 15 TABLET ORAL at 09:22

## 2023-12-11 RX ADMIN — Medication 50 MILLIGRAM(S): at 20:22

## 2023-12-11 RX ADMIN — PANTOPRAZOLE SODIUM 40 MILLIGRAM(S): 20 TABLET, DELAYED RELEASE ORAL at 09:22

## 2023-12-11 RX ADMIN — Medication 5 MILLIGRAM(S): at 20:22

## 2023-12-11 RX ADMIN — Medication 5 MILLIGRAM(S): at 09:23

## 2023-12-11 RX ADMIN — DULOXETINE HYDROCHLORIDE 120 MILLIGRAM(S): 30 CAPSULE, DELAYED RELEASE ORAL at 09:22

## 2023-12-11 RX ADMIN — Medication 175 MICROGRAM(S): at 06:19

## 2023-12-11 RX ADMIN — Medication 1 TABLET(S): at 20:21

## 2023-12-11 NOTE — BH INPATIENT PSYCHIATRY PROGRESS NOTE - NSBHASSESSSUMMFT_PSY_ALL_CORE
Patient is a 34 y/o  woman, she is  with a 4y/o, currently domiciled with her , unemployed and supported by her family, patient is Lithuanian speaker; that was admitted initially to the medical floor after she tried to commit suicide by OD on Wellbutrin. Patient has been medically clear and later on transfer to the unit 2W.    Today, pt reports slight improvement in depression, remains with somatic preoccupation, disclosed multiple traumatic events, states she would like to disclose these events to her mother - pt to meet with psychology fellow to discuss. Start abilify 5 mg PO QD for mood dysregulation and lyrica 25 mg PO QD for continued tingling in hands and feet     Plan:  1. Admit to the unit 2W on a voluntary status   2. Q 15 min checks   3. Medications      increase Zoloft to 100mg daily      d/c Gabapentin 100mg TID     d/c Prazosin 1 mg at bedtime      increase topamax to 50 mg PO QHS   4. Medical team to evaluate the patient as needed.  5. SW to coordinate a safe discharge plan. Patient is a 32 y/o  woman, she is  with a 4y/o, currently domiciled with her , unemployed and supported by her family, patient is Wolof speaker; that was admitted initially to the medical floor after she tried to commit suicide by OD on Wellbutrin. Patient has been medically clear and later on transfer to the unit 2W.    Today, pt reports slight improvement in depression, remains with somatic preoccupation, disclosed multiple traumatic events, states she would like to disclose these events to her mother - pt to meet with psychology fellow to discuss. Start abilify 5 mg PO QD for mood dysregulation and lyrica 25 mg PO QD for continued tingling in hands and feet     Plan:  1. Admit to the unit 2W on a voluntary status   2. Q 15 min checks   3. Medications      increase Zoloft to 100mg daily      d/c Gabapentin 100mg TID     d/c Prazosin 1 mg at bedtime      increase topamax to 50 mg PO QHS   4. Medical team to evaluate the patient as needed.  5. SW to coordinate a safe discharge plan.

## 2023-12-11 NOTE — BH INPATIENT PSYCHIATRY PROGRESS NOTE - NSBHMETABOLIC_PSY_ALL_CORE_FT
BMI: BMI (kg/m2): 29.1 (11-14-23 @ 15:15)  HbA1c: A1C with Estimated Average Glucose Result: 5.1 % (11-15-23 @ 09:30)    Glucose:   BP: --Vital Signs Last 24 Hrs  T(C): 36.8 (12-11-23 @ 07:16), Max: 36.8 (12-11-23 @ 07:16)  T(F): 98.2 (12-11-23 @ 07:16), Max: 98.2 (12-11-23 @ 07:16)  HR: --  BP: --  BP(mean): --  RR: 18 (12-11-23 @ 07:16) (18 - 18)  SpO2: --    Orthostatic VS  12-11-23 @ 07:16  Lying BP: --/-- HR: --  Sitting BP: 129/83 HR: 76  Standing BP: 133/78 HR: 89  Site: --  Mode: --  Orthostatic VS  12-10-23 @ 08:12  Lying BP: --/-- HR: --  Sitting BP: 124/65 HR: 79  Standing BP: 118/64 HR: 81  Site: --  Mode: --    Lipid Panel: Date/Time: 11-15-23 @ 09:30  Cholesterol, Serum: 201  LDL Cholesterol Calculated: 121  HDL Cholesterol, Serum: 47  Total Cholesterol/HDL Ration Measurement: --  Triglycerides, Serum: 165

## 2023-12-11 NOTE — BH INPATIENT PSYCHIATRY PROGRESS NOTE - NSBHCHARTREVIEWVS_PSY_A_CORE FT
Vital Signs Last 24 Hrs  T(C): 36.8 (12-11-23 @ 07:16), Max: 36.8 (12-11-23 @ 07:16)  T(F): 98.2 (12-11-23 @ 07:16), Max: 98.2 (12-11-23 @ 07:16)  HR: --  BP: --  BP(mean): --  RR: 18 (12-11-23 @ 07:16) (18 - 18)  SpO2: --    Orthostatic VS  12-11-23 @ 07:16  Lying BP: --/-- HR: --  Sitting BP: 129/83 HR: 76  Standing BP: 133/78 HR: 89  Site: --  Mode: --  Orthostatic VS  12-10-23 @ 08:12  Lying BP: --/-- HR: --  Sitting BP: 124/65 HR: 79  Standing BP: 118/64 HR: 81  Site: --  Mode: --

## 2023-12-11 NOTE — BH INPATIENT PSYCHIATRY PROGRESS NOTE - NSBHATTESTBILLING_PSY_A_CORE
48420-[id number] OBS or IP - moderate complexity OR 35-49 mins [id number] OBS or IP - moderate complexity OR 35-49 mins

## 2023-12-11 NOTE — BH INPATIENT PSYCHIATRY PROGRESS NOTE - CURRENT MEDICATION
MEDICATIONS  (STANDING):  ARIPiprazole 5 milliGRAM(s) Oral daily  DULoxetine 120 milliGRAM(s) Oral daily  folic acid 1 milliGRAM(s) Oral daily  levothyroxine 175 MICROGram(s) Oral daily  lidocaine 5% Ointment 1 Application(s) Topical two times a day  melatonin. 3 milliGRAM(s) Oral at bedtime  mometasone 220 MICROgram(s) Inhaler 2 Puff(s) Inhalation two times a day  multivitamin/minerals 1 Tablet(s) Oral at bedtime  naltrexone 50 milliGRAM(s) Oral daily  oxybutynin 5 milliGRAM(s) Oral two times a day  pantoprazole    Tablet 40 milliGRAM(s) Oral before breakfast  pregabalin 25 milliGRAM(s) Oral daily  sertraline 150 milliGRAM(s) Oral daily  topiramate 50 milliGRAM(s) Oral at bedtime    MEDICATIONS  (PRN):  acetaminophen     Tablet .. 650 milliGRAM(s) Oral every 6 hours PRN Mild Pain (1 - 3)  albuterol    90 MICROgram(s) HFA Inhaler 2 Puff(s) Inhalation every 6 hours PRN asthma  aluminum hydroxide/magnesium hydroxide/simethicone Suspension 30 milliLiter(s) Oral every 6 hours PRN Dyspepsia  hydrOXYzine hydrochloride 25 milliGRAM(s) Oral every 6 hours PRN Anxiety  ibuprofen  Tablet. 600 milliGRAM(s) Oral every 8 hours PRN Moderate Pain (4 - 6)  OLANZapine 5 milliGRAM(s) Oral every 6 hours PRN Severe agitation or psychosis  ondansetron    Tablet 4 milliGRAM(s) Oral every 8 hours PRN nausea  traZODone 50 milliGRAM(s) Oral at bedtime PRN Insomnia

## 2023-12-11 NOTE — BH INPATIENT PSYCHIATRY PROGRESS NOTE - NSBHFUPINTERVALHXFT_PSY_A_CORE
Pt assessed for depression s/p SA via OD on wellbutrin. Chart reviewed and case discussed with treatment team. No interval events reported overnight. Pt seen with SW, medical student, and Dr. Dino bryan who also provided Kazakh translation for the pt. Pt reports significant improvement in generalized body pain. Pt reports some improvements in mood because of this and states she feels better prepared for discharge. pt discussed coping mechanisms of coloring and reading. 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 Pt states that she has made arrangements to not be alone in the community and when she is not with her , she will be with her mother. Pt discussed PTSD with the psychology fellow and states she feels better prepared to speak to her mother about her trauma, "After I leave the hospital." Denies SIIP. Projected discharge 12/13/23 to trauma based therapy in the community  Pt assessed for depression s/p SA via OD on wellbutrin. Chart reviewed and case discussed with treatment team. No interval events reported overnight. Pt seen with SW, medical student, and Dr. Dino bryan who also provided Italian translation for the pt. Pt reports significant improvement in generalized body pain. Pt reports some improvements in mood because of this and states she feels better prepared for discharge. pt discussed coping mechanisms of coloring and reading. 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 Pt states that she has made arrangements to not be alone in the community and when she is not with her , she will be with her mother. Pt discussed PTSD with the psychology fellow and states she feels better prepared to speak to her mother about her trauma, "After I leave the hospital." Denies SIIP. Projected discharge 12/13/23 to trauma based therapy in the community

## 2023-12-12 PROCEDURE — 99232 SBSQ HOSP IP/OBS MODERATE 35: CPT

## 2023-12-12 RX ORDER — FERROUS SULFATE 325(65) MG
1 TABLET ORAL
Refills: 0 | DISCHARGE

## 2023-12-12 RX ORDER — TEZEPELUMAB-EKKO 210 MG/1.9ML
210 INJECTION, SOLUTION SUBCUTANEOUS
Refills: 0 | DISCHARGE

## 2023-12-12 RX ORDER — ALBUTEROL 90 UG/1
2 AEROSOL, METERED ORAL
Qty: 1 | Refills: 0
Start: 2023-12-12 | End: 2023-12-25

## 2023-12-12 RX ORDER — PANTOPRAZOLE SODIUM 20 MG/1
1 TABLET, DELAYED RELEASE ORAL
Qty: 14 | Refills: 0
Start: 2023-12-12 | End: 2023-12-25

## 2023-12-12 RX ORDER — TOPIRAMATE 25 MG
1 TABLET ORAL
Qty: 14 | Refills: 0
Start: 2023-12-12 | End: 2023-12-25

## 2023-12-12 RX ORDER — LANOLIN ALCOHOL/MO/W.PET/CERES
1 CREAM (GRAM) TOPICAL
Qty: 14 | Refills: 0
Start: 2023-12-12 | End: 2023-12-25

## 2023-12-12 RX ORDER — FOLIC ACID 0.8 MG
1 TABLET ORAL
Qty: 14 | Refills: 0
Start: 2023-12-12 | End: 2023-12-25

## 2023-12-12 RX ORDER — OXYBUTYNIN CHLORIDE 5 MG
1 TABLET ORAL
Qty: 28 | Refills: 0
Start: 2023-12-12 | End: 2023-12-25

## 2023-12-12 RX ORDER — SERTRALINE 25 MG/1
3 TABLET, FILM COATED ORAL
Qty: 42 | Refills: 0
Start: 2023-12-12 | End: 2023-12-25

## 2023-12-12 RX ORDER — DULOXETINE HYDROCHLORIDE 30 MG/1
2 CAPSULE, DELAYED RELEASE ORAL
Qty: 28 | Refills: 0
Start: 2023-12-12 | End: 2023-12-25

## 2023-12-12 RX ORDER — LEVOTHYROXINE SODIUM 125 MCG
1 TABLET ORAL
Qty: 14 | Refills: 0
Start: 2023-12-12 | End: 2023-12-25

## 2023-12-12 RX ORDER — NALTREXONE HYDROCHLORIDE 50 MG/1
1 TABLET, FILM COATED ORAL
Qty: 14 | Refills: 0
Start: 2023-12-12 | End: 2023-12-25

## 2023-12-12 RX ORDER — MULTIVIT-MIN/FERROUS GLUCONATE 9 MG/15 ML
1 LIQUID (ML) ORAL
Qty: 14 | Refills: 0
Start: 2023-12-12 | End: 2023-12-25

## 2023-12-12 RX ORDER — FOLIC ACID 0.8 MG
1 TABLET ORAL
Refills: 0 | DISCHARGE

## 2023-12-12 RX ORDER — ARIPIPRAZOLE 15 MG/1
1 TABLET ORAL
Qty: 14 | Refills: 0
Start: 2023-12-12 | End: 2023-12-25

## 2023-12-12 RX ORDER — BECLOMETHASONE DIPROPIONATE 40 UG/1
1 AEROSOL, METERED RESPIRATORY (INHALATION)
Refills: 0 | DISCHARGE

## 2023-12-12 RX ORDER — METHYLCELLULOSE 500 MG
3 TABLET ORAL
Refills: 0 | DISCHARGE

## 2023-12-12 RX ORDER — ALBUTEROL 90 UG/1
2 AEROSOL, METERED ORAL
Refills: 0 | DISCHARGE

## 2023-12-12 RX ORDER — LIDOCAINE 4 G/100G
1 CREAM TOPICAL
Qty: 1 | Refills: 0
Start: 2023-12-12 | End: 2023-12-25

## 2023-12-12 RX ORDER — MOMETASONE FUROATE 220 UG/1
2 INHALANT RESPIRATORY (INHALATION)
Qty: 1 | Refills: 0
Start: 2023-12-12 | End: 2023-12-25

## 2023-12-12 RX ADMIN — ARIPIPRAZOLE 5 MILLIGRAM(S): 15 TABLET ORAL at 08:21

## 2023-12-12 RX ADMIN — Medication 25 MILLIGRAM(S): at 08:21

## 2023-12-12 RX ADMIN — MOMETASONE FUROATE 2 PUFF(S): 220 INHALANT RESPIRATORY (INHALATION) at 10:02

## 2023-12-12 RX ADMIN — Medication 175 MICROGRAM(S): at 06:19

## 2023-12-12 RX ADMIN — Medication 3 MILLIGRAM(S): at 20:35

## 2023-12-12 RX ADMIN — Medication 1 TABLET(S): at 20:35

## 2023-12-12 RX ADMIN — Medication 5 MILLIGRAM(S): at 20:35

## 2023-12-12 RX ADMIN — Medication 1 MILLIGRAM(S): at 08:21

## 2023-12-12 RX ADMIN — Medication 50 MILLIGRAM(S): at 20:36

## 2023-12-12 RX ADMIN — PANTOPRAZOLE SODIUM 40 MILLIGRAM(S): 20 TABLET, DELAYED RELEASE ORAL at 08:20

## 2023-12-12 RX ADMIN — SERTRALINE 150 MILLIGRAM(S): 25 TABLET, FILM COATED ORAL at 08:21

## 2023-12-12 RX ADMIN — LIDOCAINE 1 APPLICATION(S): 4 CREAM TOPICAL at 20:36

## 2023-12-12 RX ADMIN — Medication 5 MILLIGRAM(S): at 08:20

## 2023-12-12 RX ADMIN — MOMETASONE FUROATE 2 PUFF(S): 220 INHALANT RESPIRATORY (INHALATION) at 20:34

## 2023-12-12 RX ADMIN — DULOXETINE HYDROCHLORIDE 120 MILLIGRAM(S): 30 CAPSULE, DELAYED RELEASE ORAL at 08:20

## 2023-12-12 RX ADMIN — NALTREXONE HYDROCHLORIDE 50 MILLIGRAM(S): 50 TABLET, FILM COATED ORAL at 08:20

## 2023-12-12 NOTE — BH INPATIENT PSYCHIATRY PROGRESS NOTE - NSTXPROBSUICID_PSY_ALL_CORE
SUICIDE/SELF-INJURIOUS BEHAVIOR

## 2023-12-12 NOTE — BH INPATIENT PSYCHIATRY PROGRESS NOTE - NSTXANXGOAL_PSY_ALL_CORE
Be able to perform ADLs and maintain safety despite anxiety/panic daily

## 2023-12-12 NOTE — BH INPATIENT PSYCHIATRY PROGRESS NOTE - NSTXPROBANX_PSY_ALL_CORE
ANXIETY/PANIC/FEAR

## 2023-12-12 NOTE — BH INPATIENT PSYCHIATRY PROGRESS NOTE - NSCGISEVERILLNESS_PSY_ALL_CORE
5 = Markedly ill - intrusive symptoms that distinctly impair social/occupational function or cause intrusive levels of distress
6 = Severely ill - disruptive pathology, behavior and function are frequently influenced by symptoms, may require assistance from others
4 = Moderately ill – overt symptoms causing noticeable, but modest, functional impairment or distress; symptom level may warrant medication
5 = Markedly ill - intrusive symptoms that distinctly impair social/occupational function or cause intrusive levels of distress
6 = Severely ill - disruptive pathology, behavior and function are frequently influenced by symptoms, may require assistance from others
5 = Markedly ill - intrusive symptoms that distinctly impair social/occupational function or cause intrusive levels of distress
6 = Severely ill - disruptive pathology, behavior and function are frequently influenced by symptoms, may require assistance from others
5 = Markedly ill - intrusive symptoms that distinctly impair social/occupational function or cause intrusive levels of distress
6 = Severely ill - disruptive pathology, behavior and function are frequently influenced by symptoms, may require assistance from others
5 = Markedly ill - intrusive symptoms that distinctly impair social/occupational function or cause intrusive levels of distress
6 = Severely ill - disruptive pathology, behavior and function are frequently influenced by symptoms, may require assistance from others
5 = Markedly ill - intrusive symptoms that distinctly impair social/occupational function or cause intrusive levels of distress
5 = Markedly ill - intrusive symptoms that distinctly impair social/occupational function or cause intrusive levels of distress

## 2023-12-12 NOTE — BH INPATIENT PSYCHIATRY PROGRESS NOTE - NSBHMSETHTPROC_PSY_A_CORE
Linear

## 2023-12-12 NOTE — BH INPATIENT PSYCHIATRY PROGRESS NOTE - NSBHMSEAFFQUAL_PSY_A_CORE
Depressed/Other
Depressed
Euthymic
Depressed
Depressed
Depressed/Anxious
Depressed
Euthymic
Depressed/Anxious

## 2023-12-12 NOTE — BH INPATIENT PSYCHIATRY PROGRESS NOTE - NSBHFUPINTERVALHXFT_PSY_A_CORE
Pt assessed for depression s/p SA via OD on wellbutrin. Chart reviewed and case discussed with treatment team. No interval events reported overnight. Pt seen with medical student, and Dr. Sun present who also provided Puerto Rican translation for the pt. Pt reports some depression and anxiety overnight, but states this improved when she received a hug from a peer. Pt states that she will inform her  that he should not try and talk to her when she is depressed, but rather hug her or leave her alone. Pt provided with positive reinforcement for establishing boundaries and expressing her needs with her loved ones. Pt reports improvements in mood because of this and states she feels better prepared for discharge. Pt is future oriented and states that the first thing she wants to do when discharged in to hug her child. Denies SIIP. Projected discharge 12/13/23 to trauma based therapy in the community     Spoke to pt's  Jesse with Dr. Sun and SW present: he does not have any safety concerns re: discharge  tomorrow and will come to  the pt tomorrow at 12PM. Discharge planning discussed with pt's  and he verbalized understanding  Pt assessed for depression s/p SA via OD on wellbutrin. Chart reviewed and case discussed with treatment team. No interval events reported overnight. Pt seen with medical student, and Dr. Sun present who also provided Monegasque translation for the pt. Pt reports some depression and anxiety overnight, but states this improved when she received a hug from a peer. Pt states that she will inform her  that he should not try and talk to her when she is depressed, but rather hug her or leave her alone. Pt provided with positive reinforcement for establishing boundaries and expressing her needs with her loved ones. Pt reports improvements in mood because of this and states she feels better prepared for discharge. Pt is future oriented and states that the first thing she wants to do when discharged in to hug her child. Denies SIIP. Projected discharge 12/13/23 to trauma based therapy in the community     Spoke to pt's  Jesse with Dr. Sun and SW present: he does not have any safety concerns re: discharge  tomorrow and will come to  the pt tomorrow at 12PM. Discharge planning discussed with pt's  and he verbalized understanding

## 2023-12-12 NOTE — BH INPATIENT PSYCHIATRY PROGRESS NOTE - NSTXDEPRESDATETRGT_PSY_ALL_CORE
22-Nov-2023
28-Nov-2023
22-Nov-2023
05-Dec-2023
28-Nov-2023
06-Dec-2023
28-Nov-2023
22-Nov-2023
06-Dec-2023
13-Dec-2023
28-Nov-2023
13-Dec-2023
28-Nov-2023
22-Nov-2023
05-Dec-2023
22-Nov-2023
13-Dec-2023

## 2023-12-12 NOTE — BH INPATIENT PSYCHIATRY PROGRESS NOTE - NSBHMSELANG_PSY_A_CORE
No abnormalities noted

## 2023-12-12 NOTE — BH INPATIENT PSYCHIATRY PROGRESS NOTE - NSICDXBHSECONDARYDX_PSY_ALL_CORE
Asthma   J45.909  Thyroid cancer   C73  H/O fibromyalgia   Z87.39  

## 2023-12-12 NOTE — BH INPATIENT PSYCHIATRY PROGRESS NOTE - NSBHMSERELATED_PSY_A_CORE
Good

## 2023-12-12 NOTE — BH INPATIENT PSYCHIATRY PROGRESS NOTE - NSBHMSEJUDGE_PSY_A_CORE
Fair
Good
Fair
Fair
Good
Fair
Good
Fair

## 2023-12-12 NOTE — BH INPATIENT PSYCHIATRY PROGRESS NOTE - NSICDXBHPRIMARYDX_PSY_ALL_CORE
MDD (major depressive disorder)   F32.9  

## 2023-12-12 NOTE — BH INPATIENT PSYCHIATRY PROGRESS NOTE - NSBHATTESTBILLING_PSY_A_CORE
21989-Xkdoncvogb OBS or IP - moderate complexity OR 35-49 mins 84688-Pxrbtexyef OBS or IP - moderate complexity OR 35-49 mins

## 2023-12-12 NOTE — BH INPATIENT PSYCHIATRY PROGRESS NOTE - NSBHASSESSSUMMFT_PSY_ALL_CORE
Patient is a 32 y/o  woman, she is  with a 4y/o, currently domiciled with her , unemployed and supported by her family, patient is Lao speaker; that was admitted initially to the medical floor after she tried to commit suicide by OD on Wellbutrin. Patient has been medically clear and later on transfer to the unit 2W.    Today, pt reports continued improvements in depression, feels she is better prepared for discharge and asking for help in the community. Denies SIIP. Projected discharge tomorrow     Plan:  1. Admit to the unit 2W on a voluntary status   2. Q 15 min checks   3. Medications      increase Zoloft to 100mg daily      d/c Gabapentin 100mg TID     d/c Prazosin 1 mg at bedtime      increase topamax to 50 mg PO QHS   4. Medical team to evaluate the patient as needed.  5. SW to coordinate a safe discharge plan. Patient is a 34 y/o  woman, she is  with a 2y/o, currently domiciled with her , unemployed and supported by her family, patient is Turkmen speaker; that was admitted initially to the medical floor after she tried to commit suicide by OD on Wellbutrin. Patient has been medically clear and later on transfer to the unit 2W.    Today, pt reports continued improvements in depression, feels she is better prepared for discharge and asking for help in the community. Denies SIIP. Projected discharge tomorrow     Plan:  1. Admit to the unit 2W on a voluntary status   2. Q 15 min checks   3. Medications      increase Zoloft to 100mg daily      d/c Gabapentin 100mg TID     d/c Prazosin 1 mg at bedtime      increase topamax to 50 mg PO QHS   4. Medical team to evaluate the patient as needed.  5. SW to coordinate a safe discharge plan.

## 2023-12-12 NOTE — BH INPATIENT PSYCHIATRY PROGRESS NOTE - NSBHMSEGAIT_PSY_A_CORE
Normal gait / station

## 2023-12-12 NOTE — BH INPATIENT PSYCHIATRY PROGRESS NOTE - NSBHCONSDANGERSELF_PSY_A_CORE
suicidal behavior

## 2023-12-12 NOTE — BH INPATIENT PSYCHIATRY PROGRESS NOTE - NSBHLEGALSTATUSCHANGE_PSY_ALL_CORE
No
1.	ARPITA  2.	Pneumonia  3.	Hypertension    stable renal function. To continue current meds. Monitor BP trend.   Titrate BP meds as needed. Salt restriction. D/c planning. Out pt follow up as needed.
No

## 2023-12-12 NOTE — BH INPATIENT PSYCHIATRY PROGRESS NOTE - NSTXSUICIDDATEEST_PSY_ALL_CORE
14-Nov-2023

## 2023-12-12 NOTE — BH INPATIENT PSYCHIATRY PROGRESS NOTE - NSBHMSESPEECH_PSY_A_CORE
Normal volume, rate, productivity, spontaneity and articulation

## 2023-12-12 NOTE — BH INPATIENT PSYCHIATRY PROGRESS NOTE - NSDCCRITERIA_PSY_ALL_CORE
CGI <3

## 2023-12-12 NOTE — BH INPATIENT PSYCHIATRY PROGRESS NOTE - NSBHMSETHTCONTENT_PSY_A_CORE
Unremarkable
Hopelessness
Unremarkable
Hopelessness
Unremarkable
Hopelessness

## 2023-12-12 NOTE — BH INPATIENT PSYCHIATRY PROGRESS NOTE - NSTXANXINTERMD_PSY_ALL_CORE
med management, psychoed 

## 2023-12-12 NOTE — BH INPATIENT PSYCHIATRY PROGRESS NOTE - NSBHMETABOLIC_PSY_ALL_CORE_FT
BMI: BMI (kg/m2): 29.1 (11-14-23 @ 15:15)  HbA1c: A1C with Estimated Average Glucose Result: 5.1 % (11-15-23 @ 09:30)    Glucose:   BP: --Vital Signs Last 24 Hrs  T(C): 36.8 (12-12-23 @ 07:25), Max: 36.8 (12-12-23 @ 07:25)  T(F): 98.3 (12-12-23 @ 07:25), Max: 98.3 (12-12-23 @ 07:25)  HR: --  BP: --  BP(mean): --  RR: 19 (12-12-23 @ 07:25) (19 - 19)  SpO2: --    Orthostatic VS  12-12-23 @ 07:25  Lying BP: --/-- HR: --  Sitting BP: 118/81 HR: 84  Standing BP: 108/78 HR: 94  Site: --  Mode: --  Orthostatic VS  12-11-23 @ 07:16  Lying BP: --/-- HR: --  Sitting BP: 129/83 HR: 76  Standing BP: 133/78 HR: 89  Site: --  Mode: --    Lipid Panel: Date/Time: 11-15-23 @ 09:30  Cholesterol, Serum: 201  LDL Cholesterol Calculated: 121  HDL Cholesterol, Serum: 47  Total Cholesterol/HDL Ration Measurement: --  Triglycerides, Serum: 165

## 2023-12-12 NOTE — BH INPATIENT PSYCHIATRY PROGRESS NOTE - NSBHATTESTATTENDPERFORM_PSY_A_CORE
Medical Decision Making

## 2023-12-12 NOTE — BH INPATIENT PSYCHIATRY PROGRESS NOTE - NSTXDCOPLKDATETRGT_PSY_ALL_CORE
04-Dec-2023
11-Dec-2023
04-Dec-2023
23-Nov-2023
27-Nov-2023
23-Nov-2023
05-Dec-2023
23-Nov-2023
11-Dec-2023
27-Nov-2023
05-Dec-2023
04-Dec-2023
11-Dec-2023
23-Nov-2023
13-Dec-2023
18-Dec-2023
13-Dec-2023
18-Dec-2023

## 2023-12-12 NOTE — BH INPATIENT PSYCHIATRY PROGRESS NOTE - NSBHFUPINTERVALCCFT_PSY_A_CORE
s/p suicide attempt
"I am feeling tired"
s/p suicide attempt
s/p suicide attempt
" I am feeling the same."
s/p suicide attempt
"I am feeling better now"
s/p suicide attempt
" I am feeling better"
s/p suicide attempt

## 2023-12-12 NOTE — BH INPATIENT PSYCHIATRY PROGRESS NOTE - NSTXDEPRESDATEEST_PSY_ALL_CORE
15-Nov-2023

## 2023-12-12 NOTE — BH INPATIENT PSYCHIATRY PROGRESS NOTE - NSBHCHARTREVIEWVS_PSY_A_CORE FT
Vital Signs Last 24 Hrs  T(C): 36.8 (12-12-23 @ 07:25), Max: 36.8 (12-12-23 @ 07:25)  T(F): 98.3 (12-12-23 @ 07:25), Max: 98.3 (12-12-23 @ 07:25)  HR: --  BP: --  BP(mean): --  RR: 19 (12-12-23 @ 07:25) (19 - 19)  SpO2: --    Orthostatic VS  12-12-23 @ 07:25  Lying BP: --/-- HR: --  Sitting BP: 118/81 HR: 84  Standing BP: 108/78 HR: 94  Site: --  Mode: --  Orthostatic VS  12-11-23 @ 07:16  Lying BP: --/-- HR: --  Sitting BP: 129/83 HR: 76  Standing BP: 133/78 HR: 89  Site: --  Mode: --

## 2023-12-12 NOTE — BH INPATIENT PSYCHIATRY PROGRESS NOTE - LEVEL OF CONSCIOUSNESS
Alert
Pt starting to experience UTI symptoms again.  Pt states she gets these frequently and MD will usually just prescribe something for her.  Advised MD out of the office until Thursday, and offered to have pt come in to leave a sample at the lab for on-call MD to address.  Pt declined stating it is too hot outside for her to leave her house today.    Please advise  
Alert

## 2023-12-12 NOTE — BH INPATIENT PSYCHIATRY PROGRESS NOTE - NSTXANXDATETRGT_PSY_ALL_CORE
21-Nov-2023
28-Nov-2023
28-Nov-2023
21-Nov-2023
28-Nov-2023
05-Dec-2023
28-Nov-2023
28-Nov-2023
21-Nov-2023
05-Dec-2023
21-Nov-2023
21-Nov-2023
05-Dec-2023
21-Nov-2023
21-Nov-2023
13-Dec-2023
05-Dec-2023
05-Dec-2023

## 2023-12-12 NOTE — BH INPATIENT PSYCHIATRY PROGRESS NOTE - NSBHMSEGROOM_PSY_A_CORE
Fair
Good
Fair

## 2023-12-12 NOTE — BH INPATIENT PSYCHIATRY PROGRESS NOTE - NSBHATTESTATTENDBILLTIME_PSY_A_CORE
I independently performed the documented

## 2023-12-12 NOTE — BH INPATIENT PSYCHIATRY PROGRESS NOTE - NSBHMSEKNOWHOW_PSY_ALL_CORE
Current Events/Educational attainment
Educational attainment
Current Events/Educational attainment
Educational attainment
Educational attainment

## 2023-12-12 NOTE — BH INPATIENT PSYCHIATRY PROGRESS NOTE - NSTXDCOPLKGOAL_PSY_ALL_CORE
Will agree to consider an appropriate level of outpatient care
Other...
Will agree to consider an appropriate level of outpatient care
Other...
Will agree to consider an appropriate level of outpatient care

## 2023-12-12 NOTE — BH INPATIENT PSYCHIATRY PROGRESS NOTE - NSBHMSEINSIGHT_PSY_A_CORE
Good
Fair
Good
Fair
Good
Fair
Fair

## 2023-12-12 NOTE — BH INPATIENT PSYCHIATRY PROGRESS NOTE - NSTXDEPRESGOAL_PSY_ALL_CORE
Will identify thoughts and self-talk that contribute to depression
Will identify 2 coping skills that assist in improving mood
Will identify thoughts and self-talk that contribute to depression
Will identify 2 coping skills that assist in improving mood
Will identify thoughts and self-talk that contribute to depression
Will identify thoughts and self-talk that contribute to depression
Will identify 2 coping skills that assist in improving mood
Will identify 2 coping skills that assist in improving mood
Will identify thoughts and self-talk that contribute to depression
Will identify 2 coping skills that assist in improving mood
Will identify 2 coping skills that assist in improving mood
Will identify thoughts and self-talk that contribute to depression
Will identify 2 coping skills that assist in improving mood
Will identify thoughts and self-talk that contribute to depression
Will identify 2 coping skills that assist in improving mood
Will identify 2 coping skills that assist in improving mood
Will identify thoughts and self-talk that contribute to depression
Will identify thoughts and self-talk that contribute to depression

## 2023-12-12 NOTE — BH INPATIENT PSYCHIATRY PROGRESS NOTE - NSBHMSEAFFRANGE_PSY_A_CORE
Constricted
Full
Constricted
Full

## 2023-12-12 NOTE — BH INPATIENT PSYCHIATRY PROGRESS NOTE - NSTXDCOPLKPROGRES_PSY_ALL_CORE
Improving
No Change
Improving
No Change
Improving
No Change
Improving
No Change
Improving

## 2023-12-12 NOTE — BH INPATIENT PSYCHIATRY PROGRESS NOTE - NSTXSUICIDDATETRGT_PSY_ALL_CORE
21-Nov-2023
21-Nov-2023
28-Nov-2023
13-Dec-2023
21-Nov-2023
21-Nov-2023
28-Nov-2023
05-Dec-2023
28-Nov-2023
21-Nov-2023
05-Dec-2023
05-Dec-2023
13-Dec-2023
21-Nov-2023
13-Dec-2023
28-Nov-2023
21-Nov-2023
05-Dec-2023
28-Nov-2023
13-Dec-2023
05-Dec-2023

## 2023-12-12 NOTE — BH INPATIENT PSYCHIATRY PROGRESS NOTE - NSBHATTESTAPPBILLTIME_PSY_A_CORE
I attest my time as KAYLEIGH is greater than 50% of the total combined time spent on qualifying patient care activities. I have reviewed and verified the documentation.

## 2023-12-12 NOTE — BH INPATIENT PSYCHIATRY PROGRESS NOTE - NSTXSUICIDGOAL_PSY_ALL_CORE
Be able to state 3 reasons for living

## 2023-12-12 NOTE — BH INPATIENT PSYCHIATRY PROGRESS NOTE - NSBHATTESTATTENDBILLTIME2_PSY_A_CORE
I have reviewed and verified the documentation.

## 2023-12-12 NOTE — BH INPATIENT PSYCHIATRY PROGRESS NOTE - NSBHMSEHYG_PSY_A_CORE
Fair
Good
Fair

## 2023-12-12 NOTE — BH INPATIENT PSYCHIATRY PROGRESS NOTE - NSBHMSEMOOD_PSY_A_CORE
Other
Depressed/Anxious
Depressed/Anxious/Other
Depressed/Anxious
Depressed
Depressed/Anxious
Depressed
Depressed/Anxious
Depressed/Anxious
Other

## 2023-12-13 VITALS — TEMPERATURE: 98 F | RESPIRATION RATE: 18 BRPM | OXYGEN SATURATION: 99 %

## 2023-12-13 RX ADMIN — DULOXETINE HYDROCHLORIDE 120 MILLIGRAM(S): 30 CAPSULE, DELAYED RELEASE ORAL at 08:29

## 2023-12-13 RX ADMIN — Medication 175 MICROGRAM(S): at 06:41

## 2023-12-13 RX ADMIN — Medication 5 MILLIGRAM(S): at 08:29

## 2023-12-13 RX ADMIN — NALTREXONE HYDROCHLORIDE 50 MILLIGRAM(S): 50 TABLET, FILM COATED ORAL at 08:29

## 2023-12-13 RX ADMIN — PANTOPRAZOLE SODIUM 40 MILLIGRAM(S): 20 TABLET, DELAYED RELEASE ORAL at 08:29

## 2023-12-13 RX ADMIN — Medication 25 MILLIGRAM(S): at 08:28

## 2023-12-13 RX ADMIN — MOMETASONE FUROATE 2 PUFF(S): 220 INHALANT RESPIRATORY (INHALATION) at 10:23

## 2023-12-13 RX ADMIN — Medication 1 MILLIGRAM(S): at 08:29

## 2023-12-13 RX ADMIN — SERTRALINE 150 MILLIGRAM(S): 25 TABLET, FILM COATED ORAL at 08:29

## 2023-12-13 RX ADMIN — ARIPIPRAZOLE 5 MILLIGRAM(S): 15 TABLET ORAL at 08:29

## 2023-12-29 RX ORDER — ARIPIPRAZOLE 15 MG/1
1 TABLET ORAL
Qty: 15 | Refills: 0
Start: 2023-12-29 | End: 2024-01-12

## 2023-12-29 RX ORDER — DULOXETINE HYDROCHLORIDE 30 MG/1
2 CAPSULE, DELAYED RELEASE ORAL
Qty: 30 | Refills: 0
Start: 2023-12-29 | End: 2024-01-12

## 2023-12-29 RX ORDER — TOPIRAMATE 25 MG
1 TABLET ORAL
Qty: 15 | Refills: 0
Start: 2023-12-29 | End: 2024-01-12

## 2023-12-29 RX ORDER — PANTOPRAZOLE SODIUM 20 MG/1
1 TABLET, DELAYED RELEASE ORAL
Qty: 15 | Refills: 0
Start: 2023-12-29 | End: 2024-01-12

## 2023-12-29 RX ORDER — LANOLIN ALCOHOL/MO/W.PET/CERES
1 CREAM (GRAM) TOPICAL
Qty: 15 | Refills: 0
Start: 2023-12-29 | End: 2024-01-12

## 2023-12-29 RX ORDER — MULTIVIT-MIN/FERROUS GLUCONATE 9 MG/15 ML
1 LIQUID (ML) ORAL
Qty: 15 | Refills: 0
Start: 2023-12-29 | End: 2024-01-12

## 2023-12-29 RX ORDER — LEVOTHYROXINE SODIUM 125 MCG
1 TABLET ORAL
Qty: 15 | Refills: 0
Start: 2023-12-29 | End: 2024-01-12

## 2023-12-29 RX ORDER — SERTRALINE 25 MG/1
3 TABLET, FILM COATED ORAL
Qty: 45 | Refills: 0
Start: 2023-12-29 | End: 2024-01-12

## 2023-12-29 RX ORDER — OXYBUTYNIN CHLORIDE 5 MG
1 TABLET ORAL
Qty: 30 | Refills: 0
Start: 2023-12-29 | End: 2024-01-12

## 2023-12-29 RX ORDER — FOLIC ACID 0.8 MG
1 TABLET ORAL
Qty: 15 | Refills: 0
Start: 2023-12-29 | End: 2024-01-12

## 2023-12-29 RX ORDER — NALTREXONE HYDROCHLORIDE 50 MG/1
1 TABLET, FILM COATED ORAL
Qty: 15 | Refills: 0
Start: 2023-12-29 | End: 2024-01-12

## 2023-12-29 NOTE — BH CHART NOTE - NSEVENTNOTEFT_PSY_ALL_CORE
call this writer today to informed me that the patient ran out of her medications. I then call the patient at . Patient informed me that she went to the appointment with the therapist but they didn't give her an appointment with the psychiatrist. She has not call the clinic again to follow up or ask for refills, patient tells me that she doesn't have the number. I called [facility] ; they have the information of the patient and patient is in the process of completing the intake in order for her to have an appointment with the psychiatrist. I informed them that the patient ran out of her medications. I will be providing the patient with another 2 weeks of medications. I provided them with the contact information of the patient and they will be communicating with the patient.    Medications have been send to the [facility]. Patient's  will be picking the medications. Situation with the patient has been discuss with our pharmacist.

## 2024-09-09 NOTE — BH PATIENT PROFILE - TOBACCO USE
----- Message from [place] MA sent at 9/9/2024  1:24 PM CDT -----  Type: RX Refill Request    Who Called: self    Have you contacted your pharmacy:no    Refill or New Rx:refill     RX Name and Strength:    gabapentin (NEURONTIN) 300 MG capsule      Preferred Pharmacy with phone number:[id number]  [place] - [address] AT [address]   Phone: [phone]  Fax: [phone]          Local or Mail Order:local    Ordering Provider:Becky    Would the patient rather a call back or a response via [facility]? Yes, call     Best Call Back Number: [phone] (home)  
Called patient and notified that medication has been signed, she verbalized understanding.  
Never smoker

## 2025-02-06 NOTE — PROVIDER CONTACT NOTE (OTHER) - ASSESSMENT
Return to the emergency department for any new or worsening symptoms including, but not limited to, signs of dehydration (no tear production, decreased urine output), signs of difficulty breathing (flaring of the nostrils, retractions, increased belly breathing), vomiting after every feed/drink, or fever for more than 5 consecutive days.   Pt is AAOx4, unlabored breathing on room air. normal sinus rhythm on telemetry monitoring. Pt stating she is in severe pain from her fibromyalgia and wants medication for it.

## 2025-06-20 NOTE — BH CONSULTATION LIAISON PROGRESS NOTE - NSBHPSYCHOLCOGORIENT_PSY_A_CORE
Prior to admission on Aldactone  Blood pressure too low to initiate antihypertensive regimen, can reevaluate as an outpatient  
Oriented to time, place, person, situation